# Patient Record
Sex: MALE | Race: WHITE | NOT HISPANIC OR LATINO | Employment: OTHER | URBAN - METROPOLITAN AREA
[De-identification: names, ages, dates, MRNs, and addresses within clinical notes are randomized per-mention and may not be internally consistent; named-entity substitution may affect disease eponyms.]

---

## 2018-05-17 ENCOUNTER — CONVERSION ENCOUNTER (OUTPATIENT)
Dept: ORTHOPEDIC SURGERY | Facility: CLINIC | Age: 68
End: 2018-05-17

## 2018-05-17 ENCOUNTER — OFFICE VISIT CONVERTED (OUTPATIENT)
Dept: ORTHOPEDIC SURGERY | Facility: CLINIC | Age: 68
End: 2018-05-17
Attending: ORTHOPAEDIC SURGERY

## 2018-06-18 ENCOUNTER — CONVERSION ENCOUNTER (OUTPATIENT)
Dept: ORTHOPEDIC SURGERY | Facility: CLINIC | Age: 68
End: 2018-06-18

## 2018-06-18 ENCOUNTER — OFFICE VISIT CONVERTED (OUTPATIENT)
Dept: ORTHOPEDIC SURGERY | Facility: CLINIC | Age: 68
End: 2018-06-18
Attending: ORTHOPAEDIC SURGERY

## 2018-10-16 ENCOUNTER — OFFICE VISIT CONVERTED (OUTPATIENT)
Dept: SURGERY | Facility: CLINIC | Age: 68
End: 2018-10-16
Attending: SURGERY

## 2019-01-09 ENCOUNTER — HOSPITAL ENCOUNTER (OUTPATIENT)
Dept: SURGERY | Facility: HOSPITAL | Age: 69
Setting detail: HOSPITAL OUTPATIENT SURGERY
Discharge: HOME | End: 2019-01-09
Attending: SURGERY

## 2019-01-09 LAB — GLUCOSE BLD-MCNC: 107 MG/DL (ref 70–99)

## 2020-07-27 ENCOUNTER — OFFICE VISIT CONVERTED (OUTPATIENT)
Dept: SURGERY | Facility: CLINIC | Age: 70
End: 2020-07-27
Attending: SURGERY

## 2020-07-27 ENCOUNTER — CONVERSION ENCOUNTER (OUTPATIENT)
Dept: SURGERY | Facility: CLINIC | Age: 70
End: 2020-07-27

## 2020-07-30 ENCOUNTER — HOSPITAL ENCOUNTER (OUTPATIENT)
Dept: PREADMISSION TESTING | Facility: HOSPITAL | Age: 70
Discharge: HOME | End: 2020-07-30
Attending: SURGERY

## 2020-08-02 LAB — SARS-COV-2 RNA SPEC QL NAA+PROBE: NOT DETECTED

## 2020-08-04 ENCOUNTER — HOSPITAL ENCOUNTER (OUTPATIENT)
Dept: PERIOP | Facility: HOSPITAL | Age: 70
Setting detail: HOSPITAL OUTPATIENT SURGERY
Discharge: HOME | End: 2020-08-04
Attending: SURGERY

## 2020-08-04 LAB
GLUCOSE BLD-MCNC: 113 MG/DL (ref 70–99)
GLUCOSE BLD-MCNC: 114 MG/DL (ref 70–99)

## 2020-08-12 ENCOUNTER — OFFICE VISIT CONVERTED (OUTPATIENT)
Dept: SURGERY | Facility: CLINIC | Age: 70
End: 2020-08-12
Attending: SURGERY

## 2020-08-26 ENCOUNTER — OFFICE VISIT CONVERTED (OUTPATIENT)
Dept: SURGERY | Facility: CLINIC | Age: 70
End: 2020-08-26
Attending: SURGERY

## 2021-03-15 ENCOUNTER — HOSPITAL ENCOUNTER (OUTPATIENT)
Dept: VACCINE CLINIC | Facility: HOSPITAL | Age: 71
Discharge: HOME | End: 2021-03-15
Attending: INTERNAL MEDICINE

## 2021-04-05 ENCOUNTER — HOSPITAL ENCOUNTER (OUTPATIENT)
Dept: VACCINE CLINIC | Facility: HOSPITAL | Age: 71
Discharge: HOME | End: 2021-04-05
Attending: INTERNAL MEDICINE

## 2021-05-10 NOTE — H&P
History and Physical      Patient Name: Christopher Finley   Patient ID: [id number]   Sex: Male   YOB: 1950    Primary Care Provider: Samy Wing MD   Referring Provider: Samy Wing MD    Visit Date: August 12, 2020    Provider: Yomaira Roberts MD   Location: [facility]   Location Address: [address] [place] [address]  [id number]   Location Phone: [phone]          Chief Complaint  · Follow Up Surgery      History Of Present Illness  Christopher Finley is a 69 year old /White male who is here today for follow up of: hemorrhoidectomy x 2.   He is doing well-status post surgery. Pathologyw as hemorrhoids.       Past Medical History  Anemia, Unspecified; Arthritis; Arthritis; Bladder Disorder; Blood Diseases; Diabetes; High blood pressure; High cholesterol; Hyperlipemia; Hypertension; Left elbow pain; Left Radial Head Fracture; Left shoulder pain, unspecified chronicity; Left wrist pain; Mood disorder; Rectal bleeding; Reflux         Past Surgical History  Colonoscopy; Hernia         Medication List  aspirin 81 mg oral tablet,chewable; bupropion HCl 300 mg oral tablet extended release 24 hr; Eye Drops 0.05-0.25 % ophthalmic (eye) drops; iron 325 mg (65 mg iron) oral tablet; Lexapro 20 mg oral tablet; lisinopril 10 mg oral tablet; metformin 500 mg oral tablet extended release 24 hr; pantoprazole 40 mg oral tablet,delayed release (DR/EC); Tiazac 180 mg oral capsule,extended release 24 hr         Allergy List  amoxicillin       Allergies Reconciled  Family Medical History  Stroke; Heart Disease; Cancer, Unspecified; Renal Calculus; -Father's Family History Unknown; -Mother's Family History Unknown; Family history of certain chronic disabling diseases; arthritis         Social History  Alcohol (Never); Alcohol Use (Never); Caffeine (Current some day); Claustophobic (Unknown); lives with spouse; .; Recreational Drug Use (Never); Retired.; Second hand smoke exposure  "(Never); Tobacco (Former)         Review of Systems  · Constitutional  o Denies  o : fever, chills  · Eyes  o Denies  o : yellowish discoloration of the eyes, eye pain  · HENT  o Denies  o : difficulty swallowing, hoarseness  · Cardiovascular  o Denies  o : chest pain on exertion, irregular heart beats  · Respiratory  o Denies  o : shortness of breath, cough  · Gastrointestinal  o Denies  o : nausea, vomiting, diarrhea, constipation  · Integument  o Admits  o : see HPI for surgical incision healing  · Neurologic  o Denies  o : tingling or numbness, loss of balance  · Musculoskeletal  o Denies  o : joint pain, back pain  · Endocrine  o Denies  o : weight gain, weight loss  · All Others Negative      Vitals  Date Time BP Position Site L\R Cuff Size HR RR TEMP (F) WT  HT  BMI kg/m2 BSA m2 O2 Sat HC       08/12/2020 09:12 AM       17  228lbs 0oz 5'  7\" 35.71 2.21           Physical Examination  · Constitutional  o Appearance  o : well developed/well nourished patient in no apparent distress  · Respiratory  o Inspection of Chest  o : equal breaths bilaterally  · Gastrointestinal  o Abdominal Examination  o : soft/nontender, nondistended, no organomegaly appreciated  · Post Surgical Incision  o Surgical wound  o : healing well, no erythema          Assessment  · Postoperative Exam Following Surgery     V67.00/Z09      Plan  · Medications  o Medications have been Reconciled  o Transition of Care or Provider Policy  · Instructions  o Return to office with any issues.  o F/U in 2 weeks.            Electronically Signed by: Yomaira Roberts MD -Author on August 12, 2020 09:39:56 AM  "

## 2021-05-10 NOTE — H&P
History and Physical      Patient Name: Christopher Finley   Patient ID: [id number]   Sex: Male   YOB: 1950    Referring Provider: Samy Wing MD    Visit Date: July 27, 2020    Provider: Yomaira Roberts MD   Location: [facility]   Location Address: [address] [place] [address]  [id number]   Location Phone: [phone]          Chief Complaint  · Outpatient History & Physical / Surgical Orders  · Hemorrhoids      History Of Present Illness  Christopher Finley is a 69 year old /White male who is referred by Samy Wing MD and who reports rectal bleeding after defecation and proloapsing rectal lesions while straining. In addition, patient reports rectal pain and itching. There are extensive external hemorrhoids. he had a colonoscopy in jan of last year.       Past Medical History  Anemia, Unspecified; Arthritis; Arthritis; Bladder Disorder; Blood Diseases; Diabetes; High blood pressure; High cholesterol; Hyperlipemia; Hypertension; Left elbow pain; Left Radial Head Fracture; Left shoulder pain, unspecified chronicity; Left wrist pain; Mood disorder; Rectal bleeding; Reflux         Past Surgical History  Colonoscopy; Hernia         Medication List  aspirin 81 mg oral tablet,chewable; bupropion HCl 300 mg oral tablet extended release 24 hr; Eye Drops 0.05-0.25 % ophthalmic (eye) drops; iron 325 mg (65 mg iron) oral tablet; Lexapro 20 mg oral tablet; lisinopril 10 mg oral tablet; metformin 500 mg oral tablet extended release 24 hr; pantoprazole 40 mg oral tablet,delayed release (DR/EC); Tiazac 180 mg oral capsule,extended release 24 hr         Allergy List  amoxicillin       Allergies Reconciled  Family Medical History  Stroke; Heart Disease; Cancer, Unspecified; Renal Calculus; -Father's Family History Unknown; -Mother's Family History Unknown; Family history of certain chronic disabling diseases; arthritis         Social History  Alcohol (Never); Alcohol Use (Never);  "Caffeine (Current some day); Claustophobic (Unknown); lives with spouse; .; Recreational Drug Use (Never); Retired.; Second hand smoke exposure (Never); Tobacco (Former)         Review of Systems  · Cardiovascular  o Denies  o : chest pain on exertion, shortness of breath, lower extremity swelling  · Respiratory  o Denies  o : wheezing, chronic cough, coughing up blood  · Gastrointestinal  o Denies  o : chronic abdominal pain, reflux symptoms, diarrhea      Vitals  Date Time BP Position Site L\R Cuff Size HR RR TEMP (F) WT  HT  BMI kg/m2 BSA m2 O2 Sat HC       07/27/2020 11:01 AM       15  229lbs 0oz 5'  7\" 35.87 2.22           Physical Examination  · Constitutional  o Appearance  o : well developed/well nourished patient in no apparent distress  · Respiratory  o Respiratory Effort  o : breathing unlabored  o Inspection of Chest  o : equal breaths bilaterally  · Gastrointestinal  o Abdominal Examination  o : soft/nontender, nondistended, no organomegaly appreciated  · Genitourinary  o Digital Rectal Examination  o : no evidence of thrombosed external hemorrhoids on inspection, extensive external hemorrhoids.          Assessment  · Hemorrhoids, External     455.3/K64.8      Plan  · Orders  o Hemorrhoidectomy (2 or more) (93058) - 455.3/K64.8 - 08/04/2020  o [facility] Pre-Op Covid-19 Screening (22918) - 455.3/K64.8 - 07/30/2020  · Medications  o Medications have been Reconciled  o Transition of Care or Provider Policy  · Instructions  o [facility]  o Handouts Provided  o PLAN: Hemorrhoidectomy. Patient understands risk/benefits and is willing to proceed. Patient understands risks include, but are not limited to, pain, urinary retention, bleeding, infection, damage to surrounding structures, including but not limited to, external anal sphincter resulting in fecal incontinence and/or anal stenosis. In addition, patient understands the risk of reoccurance of hemorrhoids is 5 " %.  o ****Surgical Orders****  o RISK AND BENEFITS:  o Consent for surgery: Given these options, the patient has verbally expressed an understanding of the risks of surgery and finds these risks acceptable. We will proceed with surgery as soon as possible.  o PREP: Per protocol  o IV: Per Anesthesia  o Please sign permit for: Exam under anesthesia and Hemorrhoidectomy  o *******************************************  o PRE- OP MEDICATION ORDER:  o *******************************************  o Kefzol 2 grams IV on call to OR.  o The above History and Physical Examination has been completed within 30 days of admission.  o ****Patient Status****  o Outpatient            Electronically Signed by: Yomaira Roberts MD -Author on July 27, 2020 11:11:23 AM

## 2021-05-10 NOTE — H&P
History and Physical      Patient Name: Christopher Finley   Patient ID: [id number]   Sex: Male   YOB: 1950    Primary Care Provider: Samy Wing MD   Referring Provider: Samy Wing MD    Visit Date: August 26, 2020    Provider: Yomaira Roberts MD   Location: [facility]   Location Address: [address] [place] [address]  [id number]   Location Phone: [phone]          Chief Complaint  · Follow Up Surgery      History Of Present Illness  Christopher Finley is a 69 year old /White male who is here today for follow up of: hemorrhoidectomy.   He is doing well-status post surgery. Pathology was hemorrhoids. Healed up well.       Past Medical History  Anemia, Unspecified; Arthritis; Arthritis; Bladder Disorder; Blood Diseases; Diabetes; High blood pressure; High cholesterol; Hyperlipemia; Hypertension; Left elbow pain; Left Radial Head Fracture; Left shoulder pain, unspecified chronicity; Left wrist pain; Mood disorder; Rectal bleeding; Reflux         Past Surgical History  Colonoscopy; Hernia         Medication List  aspirin 81 mg oral tablet,chewable; bupropion HCl 300 mg oral tablet extended release 24 hr; Eye Drops 0.05-0.25 % ophthalmic (eye) drops; iron 325 mg (65 mg iron) oral tablet; Lexapro 20 mg oral tablet; lisinopril 10 mg oral tablet; metformin 500 mg oral tablet extended release 24 hr; pantoprazole 40 mg oral tablet,delayed release (DR/EC); Tiazac 180 mg oral capsule,extended release 24 hr         Allergy List  amoxicillin       Allergies Reconciled  Family Medical History  Stroke; Heart Disease; Cancer, Unspecified; Renal Calculus; -Father's Family History Unknown; -Mother's Family History Unknown; Family history of certain chronic disabling diseases; arthritis         Social History  Alcohol (Never); Alcohol Use (Never); Caffeine (Current some day); Claustophobic (Unknown); lives with spouse; .; Recreational Drug Use (Never); Retired.; Second hand smoke  exposure (Never); Tobacco (Former)         Review of Systems  · Constitutional  o Denies  o : fever, chills  · Eyes  o Denies  o : yellowish discoloration of the eyes, eye pain  · HENT  o Denies  o : difficulty swallowing, hoarseness  · Cardiovascular  o Denies  o : chest pain on exertion, irregular heart beats  · Respiratory  o Denies  o : shortness of breath, cough  · Gastrointestinal  o Denies  o : nausea, vomiting, diarrhea, constipation  · Integument  o Admits  o : see HPI for surgical incision healing  · Neurologic  o Denies  o : tingling or numbness, loss of balance  · Musculoskeletal  o Denies  o : joint pain, back pain  · Endocrine  o Denies  o : weight gain, weight loss  · All Others Negative      Physical Examination  · Constitutional  o Appearance  o : well developed/well nourished patient in no apparent distress  · Respiratory  o Inspection of Chest  o : equal breaths bilaterally  · Gastrointestinal  o Abdominal Examination  o : soft/nontender, nondistended, no organomegaly appreciated  · Post Surgical Incision  o Surgical wound  o : healing well, no erythema          Assessment  · Postoperative Exam Following Surgery     V67.00/Z09      Plan  · Medications  o Medications have been Reconciled  o Transition of Care or Provider Policy  · Instructions  o Return to office with any issues.  o F/U PRN            Electronically Signed by: Yomaira Roberts MD -Author on August 26, 2020 08:59:22 AM

## 2021-05-15 VITALS — RESPIRATION RATE: 17 BRPM | BODY MASS INDEX: 35.79 KG/M2 | HEIGHT: 67 IN | WEIGHT: 228 LBS

## 2021-05-15 VITALS — RESPIRATION RATE: 15 BRPM | BODY MASS INDEX: 35.94 KG/M2 | WEIGHT: 229 LBS | HEIGHT: 67 IN

## 2021-05-16 VITALS — HEIGHT: 67 IN | HEART RATE: 80 BPM | BODY MASS INDEX: 36.31 KG/M2 | WEIGHT: 231.37 LBS | OXYGEN SATURATION: 98 %

## 2021-05-16 VITALS — WEIGHT: 234.12 LBS | RESPIRATION RATE: 20 BRPM | HEIGHT: 62 IN | BODY MASS INDEX: 43.08 KG/M2

## 2021-05-16 VITALS — HEART RATE: 63 BPM | OXYGEN SATURATION: 92 % | HEIGHT: 67 IN | BODY MASS INDEX: 36.92 KG/M2 | WEIGHT: 235.25 LBS

## 2021-06-30 ENCOUNTER — TELEPHONE (OUTPATIENT)
Dept: ORTHOPEDIC SURGERY | Facility: CLINIC | Age: 71
End: 2021-06-30

## 2021-06-30 NOTE — TELEPHONE ENCOUNTER
PT CAME IN TODAY HE WAS SEEN AT [facility] 6-28-21. HE HAD IMAGES DONE THERE AS WELL, HE IS WANTING TO GET AN APPOINTMENT WITH  FOR HIS LEFT ELBOW. I JUST NEEDED TO SEE HOW SOON WE NEED TO BRING HIM IN PLEASE. INSURANCE IS MEDICARE AND . [phone].

## 2021-06-30 NOTE — TELEPHONE ENCOUNTER
[facility] JUST SHOW OSTEOARTHRITIS, NO FRACTURE. PLEASE TALK WITH JORDEN OR LAURIE ON WHEN TO FIT PATIENT IN IF SCHEDULE IS FULL, OTHERWISE I WOULD SAY FIRST AVAILABLE APPOINTMENT. THANKS!

## 2021-07-13 ENCOUNTER — OFFICE VISIT (OUTPATIENT)
Dept: ORTHOPEDIC SURGERY | Facility: CLINIC | Age: 71
End: 2021-07-13

## 2021-07-13 VITALS — WEIGHT: 234 LBS | BODY MASS INDEX: 36.73 KG/M2 | HEIGHT: 67 IN | HEART RATE: 60 BPM | OXYGEN SATURATION: 96 %

## 2021-07-13 DIAGNOSIS — M77.12 LATERAL EPICONDYLITIS OF LEFT ELBOW: Primary | ICD-10-CM

## 2021-07-13 DIAGNOSIS — M19.022 OSTEOARTHRITIS OF LEFT ELBOW, UNSPECIFIED OSTEOARTHRITIS TYPE: ICD-10-CM

## 2021-07-13 PROCEDURE — 20605 DRAIN/INJ JOINT/BURSA W/O US: CPT | Performed by: ORTHOPAEDIC SURGERY

## 2021-07-13 PROCEDURE — 99203 OFFICE O/P NEW LOW 30 MIN: CPT | Performed by: ORTHOPAEDIC SURGERY

## 2021-07-13 RX ADMIN — METHYLPREDNISOLONE ACETATE 80 MG: 80 INJECTION, SUSPENSION INTRA-ARTICULAR; INTRALESIONAL; INTRAMUSCULAR; SOFT TISSUE at 13:18

## 2021-07-13 RX ADMIN — LIDOCAINE HYDROCHLORIDE 1 ML: 10 INJECTION, SOLUTION INFILTRATION; PERINEURAL at 13:18

## 2021-07-13 NOTE — PROGRESS NOTES
"Chief Complaint  Pain of the Left Elbow     Subjective      Christopher Finley presents to [facility] for Patient presents for evaluation of left elbow pain. Patient has a history of left radial head fracture, he was last seen in 2018. Patient states the left elbow has been causing him pain for the past 8 weeks. He denies injury. He states he has been seen by his PCP, they ordered xrays, he was given naproxen, IM steroid injection and muscle relaxer and nothing has helped his elbow pain. He also received prednisone medication with no relief. Patient states he also gets some tingling to his left hand when he has the pain.     Allergies   Allergen Reactions   • Amoxicillin Hives        Social History     Socioeconomic History   • Marital status:      Spouse name: Not on file   • Number of children: Not on file   • Years of education: Not on file   • Highest education level: Not on file   Tobacco Use   • Smoking status: Former Smoker     Quit date: 1970     Years since quittin.0   • Smokeless tobacco: Never Used   Vaping Use   • Vaping Use: Never used   Substance and Sexual Activity   • Alcohol use: Never   • Drug use: Never   • Sexual activity: Defer        Review of Systems     Objective   Vital Signs:   Pulse 60   Ht 170.2 cm (67\")   Wt 106 kg (234 lb)   SpO2 96%   BMI 36.65 kg/m²       Physical Exam  Constitutional:       Appearance: Normal appearance. He is well-developed and normal weight.   HENT:      Head: Normocephalic.      Right Ear: Hearing and external ear normal.      Left Ear: Hearing and external ear normal.      Nose: Nose normal.   Eyes:      Conjunctiva/sclera: Conjunctivae normal.   Cardiovascular:      Rate and Rhythm: Normal rate.   Pulmonary:      Effort: Pulmonary effort is normal.      Breath sounds: No wheezing or rales.   Abdominal:      Palpations: Abdomen is soft.      Tenderness: There is no abdominal tenderness.   Musculoskeletal:      " Cervical back: Normal range of motion.   Skin:     Findings: No rash.   Neurological:      Mental Status: He is alert and oriented to person, place, and time.   Psychiatric:         Mood and Affect: Mood and affect normal.         Judgment: Judgment normal.       Ortho Exam      Left elbow: Skin is intact, no erythema, no ecchymosis, no swelling. Full ROM with flexion, extension, supination and pronation. Neurovascular intact. Tender to palpation of medial and lateral /posterior elbow.     Small Joint Arthrocentesis  Consent given by: patient  Site marked: site marked  Timeout: Immediately prior to procedure a time out was called to verify the correct patient, procedure, equipment, support staff and site/side marked as required   Supporting Documentation  Indications: pain   Procedure Details  Location: LEFT ELBOW   Preparation: Patient was prepped and draped in the usual sterile fashion  Needle gauge: 23G   Medications administered: 80 mg methylPREDNISolone acetate 80 MG/ML; 1 mL lidocaine 1 %  Patient tolerance: patient tolerated the procedure well with no immediate complications          XR Elbow 3+ View Left    Result Date: 6/28/2021  Narrative: PROCEDURE: XR ELBOW 3+ VW LEFT  COMPARISON: [facility], CR, ELBOW >OR= 3V LT, 5/03/2018, 12:43.  INDICATIONS: pain - old injury  FINDINGS:  No fractures are visualized.  No lytic or sclerotic bone lesions are seen.  Mild degenerative spurring is consistent with osteoarthritis.  There is no evidence of an abnormal amount of fluid within the joint.  CONCLUSION:  Left elbow series demonstrating mild degenerative spurring consistent with osteoarthritis.      CHIARA VERMA MD       Electronically Signed and Approved By: CHIARA VERMA MD on 6/28/2021 at 15:32                 Imaging Results (Most Recent)     None           Result Review :       XR Elbow 3+ View Left    Result Date: 6/28/2021  Narrative: PROCEDURE: XR ELBOW 3+ VW LEFT  COMPARISON: [facility], CR, ELBOW >OR=  3V LT, 5/03/2018, 12:43.  INDICATIONS: pain - old injury  FINDINGS:  No fractures are visualized.  No lytic or sclerotic bone lesions are seen.  Mild degenerative spurring is consistent with osteoarthritis.  There is no evidence of an abnormal amount of fluid within the joint.  CONCLUSION:  Left elbow series demonstrating mild degenerative spurring consistent with osteoarthritis.      CHIARA VERMA MD       Electronically Signed and Approved By: CHIARA VERMA MD on 6/28/2021 at 15:32                      Assessment and Plan     DX: Left elbow pain/Left elbow arthritis      Reviewed xrays with the patient, advised him of diagnosis and treatment options, patient elected to have left elbow injection and tolerated it well. Home exercises given. Follow up in 6 weeks.       Call or return if worsening symptoms.    Follow Up     Follow up in 6 weeks for recheck.      Patient was given instructions and counseling regarding his condition or for health maintenance advice. Please see specific information pulled into the AVS if appropriate.     Scribed for Black Jimenez MD by DAI Martinez.  07/13/21   13:01 EDT    I have personally performed the services described in this document as scribed by the above individual and it is both accurate and complete.  Black Jimenez MD 07/13/21  13:01 EDT

## 2021-07-14 RX ORDER — METHYLPREDNISOLONE ACETATE 80 MG/ML
80 INJECTION, SUSPENSION INTRA-ARTICULAR; INTRALESIONAL; INTRAMUSCULAR; SOFT TISSUE
Status: COMPLETED | OUTPATIENT
Start: 2021-07-13 | End: 2021-07-13

## 2021-07-14 RX ORDER — LIDOCAINE HYDROCHLORIDE 10 MG/ML
1 INJECTION, SOLUTION INFILTRATION; PERINEURAL
Status: COMPLETED | OUTPATIENT
Start: 2021-07-13 | End: 2021-07-13

## 2021-08-04 ENCOUNTER — OFFICE VISIT (OUTPATIENT)
Dept: OTOLARYNGOLOGY | Facility: CLINIC | Age: 71
End: 2021-08-04

## 2021-08-04 VITALS — TEMPERATURE: 97.8 F | WEIGHT: 235.4 LBS | BODY MASS INDEX: 36.95 KG/M2 | HEIGHT: 67 IN

## 2021-08-04 DIAGNOSIS — L98.9 LESION OF FACE: Primary | ICD-10-CM

## 2021-08-04 DIAGNOSIS — Z86.018: ICD-10-CM

## 2021-08-04 PROCEDURE — 11104 PUNCH BX SKIN SINGLE LESION: CPT | Performed by: OTOLARYNGOLOGY

## 2021-08-04 PROCEDURE — 88305 TISSUE EXAM BY PATHOLOGIST: CPT | Performed by: OTOLARYNGOLOGY

## 2021-08-04 PROCEDURE — 99203 OFFICE O/P NEW LOW 30 MIN: CPT | Performed by: OTOLARYNGOLOGY

## 2021-08-04 RX ORDER — CYPROHEPTADINE HYDROCHLORIDE 4 MG/1
4 TABLET ORAL NIGHTLY
COMMUNITY

## 2021-08-04 RX ORDER — CARBAMAZEPINE 200 MG/1
200 TABLET ORAL NIGHTLY
COMMUNITY

## 2021-08-04 NOTE — PROGRESS NOTES
Patient Name: Christopher Finley   Visit Date: 08/04/2021   Patient ID: [id number]  Provider: Octavio Davey MD    Sex: male  Location: [facility]   YOB: 1950  Location Address: [address] [place] [address], [address],?[place]?[address]    Primary Care Provider Provider, No Known  Location Phone: [phone]    Referring Provider: No ref. provider found        Chief Complaint  Oral cyst    History of Present Illness  Christopher Finley is a 70 y.o. male who presents to [facility] today as a consult from No ref. provider found for evaluation.  He tells me that back in July he was seen for routine dental examination and a cystic lesion was noted on his Panorex.  He was then sent to Dr. Slulivan ended up performing what sounds like an enucleation and curettage of the lesion in the area of tooth #32 on 7/14/2021.  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  He denies any history of skin cancer or family history of skin cancer or odontogenic cysts.  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  There is a lesion to his right cheek which he tells me has been present for years.  He is unsure if it is changing in size but does tell me it has bled previously.  It does not hurt.  He denies any neck mass or neck pain.  He has not experienced any fever, chills, night sweats, or unintentional weight loss.      Past Medical History:   Diagnosis Date   • Acid reflux    • Anemia    • Arthritis    • Bladder disorder    • Blood disease    • Chronic post-traumatic stress disorder (PTSD)    • Depression    • Diabetes (CMS/HCC)    • Diabetes mellitus (CMS/HCC)    • GERD (gastroesophageal reflux disease)    • High blood pressure    • High cholesterol    • Hyperlipemia    • Hyperlipidemia    • Hypertension    • Left elbow pain 06/18/2018   • Left radial head fracture 05/17/2018   • Left shoulder pain  2018   • Left wrist pain 2018   • Mood disorder (CMS/HCC)    • Rectal bleeding        Past Surgical History:   Procedure Laterality Date   • COLONOSCOPY     • HEMORRHOIDECTOMY      BANDED, ,, ,    • INGUINAL HERNIA REPAIR     • TENDON REPAIR      LEFT HAND         Current Outpatient Medications:   •  Aspirin Low Dose 81 MG EC tablet, , Disp: , Rfl:   •  buPROPion XL (WELLBUTRIN XL) 300 MG 24 hr tablet, bupropion HCl 300 mg oral tablet extended release 24 hr take 1 tablet (300 mg) by oral route once daily   Active, Disp: , Rfl:   •  carBAMazepine (TEGretol) 200 MG tablet, Take 200 mg by mouth Daily., Disp: , Rfl:   •  cyproheptadine (PERIACTIN) 4 MG tablet, Take 4 mg by mouth 2 (two) times a day., Disp: , Rfl:   •  dilTIAZem (Tiazac) 180 MG 24 hr capsule, Tiazac 180 mg oral capsule,extended release 24 hr take 1 capsule (180 mg) by oral route once daily   Active, Disp: , Rfl:   •  escitalopram (Lexapro) 20 MG tablet, Lexapro 20 mg oral tablet take 1 tablet by oral route 2 times a day   Active, Disp: , Rfl:   •  ferrous sulfate 325 (65 FE) MG tablet, iron 325 mg (65 mg iron) oral tablet take 1 tablet (325 mg) by oral route once daily   Active, Disp: , Rfl:   •  lisinopril (PRINIVIL,ZESTRIL) 10 MG tablet, , Disp: , Rfl:   •  metFORMIN (GLUCOPHAGE) 500 MG tablet, , Disp: , Rfl:   •  pantoprazole (PROTONIX) 40 MG EC tablet, , Disp: , Rfl:   •  rosuvastatin (CRESTOR) 40 MG tablet, , Disp: , Rfl:   •  tetrahydrozoline-zinc (VISINE-AC) 0.05-0.25 % ophthalmic solution, 1 drop., Disp: , Rfl:   •  Vitamin D 125 MCG (5000 UT) capsule capsule, , Disp: , Rfl:      Allergies   Allergen Reactions   • Amoxicillin Hives       Social History     Tobacco Use   • Smoking status: Former Smoker     Quit date: 1970     Years since quittin.1   • Smokeless tobacco: Never Used   Vaping Use   • Vaping Use: Never used   Substance Use Topics   • Alcohol use: Never   • Drug use: Never        Objective  "    Vital Signs:   Temp 97.8 °F (36.6 °C) (Temporal)   Ht 170.2 cm (67\")   Wt 107 kg (235 lb 6.4 oz)   BMI 36.87 kg/m²       Physical Exam    General: Well developed, well nourished patient of stated age in no acute distress. Voice is strong and clear.   Head: Normocephalic and atraumatic.  Face: Right preauricular ulcerated lesion measuring 1 cm in diameter with a small overlying crust.  Bilateral parotid and submandibular glands are unremarkable.  Stensen's and Warthin's ducts are productive of clear saliva bilaterally.  House-Brackmann I/VI     bilaterally.   muscles and temporomandibular joint nontender to palpation.  No TMJ crepitus.  Eyes: PERRLA, sclerae anicteric, no conjunctival injection. Extra ocular movements are intact and full. No nystagmus.   Ears: Auricles are normal in appearance. Bilateral external auditory canals are unremarkable. Bilateral tympanic membranes are clear and without effusion. Hearing normal to conversational voice.   Nose: External nose is normal in appearance. Bilateral nares are patent with normal appearing mucosa. Septum deviated to the left. Turbinates are unremarkable. No lesions.   Oral Cavity: Lips are normal in appearance. Oral mucosa is unremarkable. Gingiva is unremarkable. Partial dentition for age. Tongue is unremarkable with good movement. Hard palate is unremarkable.   Oropharynx: Soft palate is unremarkable with full movement. Uvula is unremarkable. Bilateral tonsils are unremarkable. Posterior oropharynx is unremarkable.    Larynx and hypopharynx: Deferred secondary to gag reflex.  Neck: Supple.  No mass.  Nontender to palpation.  Trachea midline. Thyroid normal size and without nodules to palpation.   Lymphatic: No lymphadenopathy upon palpation.  Respiratory: Clear to auscultation bilaterally, nonlabored respirations    Cardiovascular: RRR, no murmurs, rubs, or gallops,   Psychiatric: Appropriate affect, cooperative   Neurologic: Oriented x 3, strength " symmetric in all extremities, Cranial Nerves II-XII are grossly intact to confrontation   Skin: Warm and dry. No rashes.    Procedures      Procedure: Punch biopsy of right facial lesion.    Summary: The area surrounding the right preauricular ulcerated lesion was anesthetized with 0.5 mL 1% lidocaine with one 100,000 epinephrine.  After giving the medication ample time to take effect the skin was cleaned with alcohol wipe and a 2 mm punch biopsy was obtained from the lesion.  This will be sent for permanent pathological review.  Hemostasis was obtained with pressure.  The patient tolerated the procedure well.      Result Review :               Assessment and Plan    Diagnoses and all orders for this visit:    1. Lesion of face (Primary)  -     Tissue Pathology Exam; Future  -     Tissue Pathology Exam    2. History of odontogenic keratocyst  -     Tissue Pathology Exam; Future  -     Tissue Pathology Exam    Impressions and findings were discussed at great length.  Currently, he is seen after undergoing enucleation and curettage of an odontogenic keratocyst/keratocystic odontogenic tumor on 7/14/2021 by Dr. Sullivan .  He does have a significant history of sun exposure and there is an obviously ulcerated right preauricular mass.  We discussed that occasionally his type of odontogenic cyst can be associated with basal cell nevus syndrome.  Given the lesion to the right face have recommended punch biopsy by excision if this is in fact a basal cell carcinoma.  If this is the case he will also be referred to dermatology.  He was given ample time to ask questions, all of which were answered to satisfaction.  We will discuss his results over the phone and determine further follow-up.        Follow Up   Return in about 2 weeks (around 8/18/2021).  Patient was given instructions and counseling regarding his condition or for health maintenance advice. Please see specific information pulled into the AVS if appropriate.

## 2021-08-06 LAB
CYTO UR: NORMAL
LAB AP CASE REPORT: NORMAL
LAB AP CLINICAL INFORMATION: NORMAL
PATH REPORT.FINAL DX SPEC: NORMAL
PATH REPORT.GROSS SPEC: NORMAL

## 2021-08-18 ENCOUNTER — OFFICE VISIT (OUTPATIENT)
Dept: OTOLARYNGOLOGY | Facility: CLINIC | Age: 71
End: 2021-08-18

## 2021-08-18 DIAGNOSIS — D04.39 SQUAMOUS CELL CARCINOMA IN SITU (SCCIS) OF SKIN OF CHEEK: Primary | ICD-10-CM

## 2021-08-18 DIAGNOSIS — L98.9 LESION OF FACE: ICD-10-CM

## 2021-08-18 PROCEDURE — 99441 PR PHYS/QHP TELEPHONE EVALUATION 5-10 MIN: CPT | Performed by: OTOLARYNGOLOGY

## 2021-08-18 NOTE — PROGRESS NOTES
Patient Name: Christopher Finley   Visit Date: 08/18/2021   Patient ID: [id number]  Provider: Octavio Davey MD    Sex: male  Location: [facility]   YOB: 1950  Location Address: [address] [place] [address], [address],?[place]?[address]    Primary Care Provider Provider, No Known  Location Phone: [phone]    Referring Provider: No ref. provider found        Chief Complaint  No chief complaint on file.    History of Present Illness  TELEHEALTH TELEPHONE VISIT    Christopher Finley is a 70 y.o. male who is presenting for evaluation via telehealth telephone visit. Verbal consent obtained before beginning visit.  We reviewed the results of his 8/4/2021 punch biopsy of the right preauricular ulcer which revealed invasive moderately differentiated squamous cell carcinoma.  We discussed the pathophysiology and natural history of the skin cancers.  Options for management were discussed and he would like to pursue excision in clinic.  This will be arranged at his earliest convenience.  We spoke for a total of 6 minutes.    Past Medical History/Overview of Patient Symptoms  Past Medical History:   Diagnosis Date   • Acid reflux    • Anemia    • Arthritis    • Bladder disorder    • Blood disease    • Chronic post-traumatic stress disorder (PTSD)    • Depression    • Diabetes (CMS/HCC)    • Diabetes mellitus (CMS/HCC)    • GERD (gastroesophageal reflux disease)    • High blood pressure    • High cholesterol    • Hyperlipemia    • Hyperlipidemia    • Hypertension    • Left elbow pain 06/18/2018   • Left radial head fracture 05/17/2018   • Left shoulder pain 05/17/2018   • Left wrist pain 06/22/2018   • Mood disorder (CMS/HCC)    • Rectal bleeding            Assessment and Plan    There are no diagnoses linked to this encounter.      Follow Up   No follow-ups on file.  Patient was given instructions and counseling regarding his condition or for health maintenance advice. Please see specific information  pulled into the AVS if appropriate.

## 2021-08-24 ENCOUNTER — OFFICE VISIT (OUTPATIENT)
Dept: ORTHOPEDIC SURGERY | Facility: CLINIC | Age: 71
End: 2021-08-24

## 2021-08-24 VITALS — WEIGHT: 234.2 LBS | HEIGHT: 67 IN | BODY MASS INDEX: 36.76 KG/M2 | HEART RATE: 60 BPM | OXYGEN SATURATION: 97 %

## 2021-08-24 DIAGNOSIS — M19.022 OSTEOARTHRITIS OF LEFT ELBOW, UNSPECIFIED OSTEOARTHRITIS TYPE: ICD-10-CM

## 2021-08-24 DIAGNOSIS — M77.12 LATERAL EPICONDYLITIS OF LEFT ELBOW: Primary | ICD-10-CM

## 2021-08-24 PROCEDURE — 99213 OFFICE O/P EST LOW 20 MIN: CPT | Performed by: PHYSICIAN ASSISTANT

## 2021-08-24 NOTE — PROGRESS NOTES
"Chief Complaint  Pain of the Left Elbow    Subjective          Christopher Finley is a 70 y.o. male  presents to [facility] for   History of Present Illness    Patient presents for follow-up evaluation of left elbow arthritis, left lateral epicondylitis.  Patient was last seen by Dr. Jimenez on 2021 he received a steroid injection in his left elbow and he received home exercises.  Patient states he has done home exercises he states the injection helped for a few weeks he states it just wore off over the last week or 2.  Patient points to pain in his posterior lateral elbow, he states he uses Voltaren gel and BenGay due to his return of his pain.  Patient states the pain keeps him up at night, he denies weakness, denies difficulty with range of motion, denies recent injury he does state he had a \"chip fracture \"when he injured his elbow in 2018 due to a fall.  Patient denies swelling.  Allergies   Allergen Reactions   • Amoxicillin Hives        Social History     Socioeconomic History   • Marital status:      Spouse name: Not on file   • Number of children: Not on file   • Years of education: Not on file   • Highest education level: Not on file   Tobacco Use   • Smoking status: Former Smoker     Quit date: 1970     Years since quittin.1   • Smokeless tobacco: Never Used   Vaping Use   • Vaping Use: Never used   Substance and Sexual Activity   • Alcohol use: Never   • Drug use: Never   • Sexual activity: Defer        REVIEW OF SYSTEMS    Constitutional: Denies fevers, chills, weight loss  Cardiovascular: Denies chest pain, shortness of breath  Skin: Denies rashes, acute skin changes  Neurologic: Denies headache, loss of consciousness  MSK: Left elbow pain      Objective   Vital Signs:   Pulse 60   Ht 170.2 cm (67\")   Wt 106 kg (234 lb 3.2 oz)   SpO2 97%   BMI 36.68 kg/m²     Body mass index is 36.68 kg/m².    Physical Exam    Left elbow: No pain with resisted " wrist extension, no pain with wrist resisted third finger extension, tenderness to palpation of the lateral posterior elbow, full extension, flexion full, full supination and pronation, neurovascularly intact.    Procedures    Imaging Results (Most Recent)     None           Result Review :{Labs  Result Review  Imaging  Med Tab  Media  Procedures :23}   The following data was reviewed by: DAI Martinez on 08/24/2021:               Assessment and Plan    Diagnoses and all orders for this visit:    1. Lateral epicondylitis of left elbow (Primary)  -     MRI Elbow Left Without Contrast; Future    2. Osteoarthritis of left elbow, unspecified osteoarthritis type  -     MRI Elbow Left Without Contrast; Future        Discussed that we will order MRI of the left elbow for further evaluation, patient agreed, we discussed use of counterforce strap, patient will pursue this.  Continue Voltaren gel and BenGay, follow-up after MRI.    Call or return if worsening symptoms.    Follow Up   Return for After MRI.  Patient was given instructions and counseling regarding his condition or for health maintenance advice. Please see specific information pulled into the AVS if appropriate.

## 2021-09-03 ENCOUNTER — OFFICE VISIT (OUTPATIENT)
Dept: OTOLARYNGOLOGY | Facility: CLINIC | Age: 71
End: 2021-09-03

## 2021-09-03 VITALS — TEMPERATURE: 97.3 F | HEIGHT: 67 IN | WEIGHT: 234.6 LBS | BODY MASS INDEX: 36.82 KG/M2

## 2021-09-03 DIAGNOSIS — D04.39 SQUAMOUS CELL CARCINOMA IN SITU (SCCIS) OF SKIN OF CHEEK: Primary | ICD-10-CM

## 2021-09-03 PROCEDURE — 12051 INTMD RPR FACE/MM 2.5 CM/<: CPT | Performed by: OTOLARYNGOLOGY

## 2021-09-03 PROCEDURE — 88305 TISSUE EXAM BY PATHOLOGIST: CPT | Performed by: OTOLARYNGOLOGY

## 2021-09-03 PROCEDURE — 11643 EXC F/E/E/N/L MAL+MRG 2.1-3: CPT | Performed by: OTOLARYNGOLOGY

## 2021-09-03 NOTE — PROGRESS NOTES
Procedure: Excision of malignant lesion of the face.    Indication: Right preauricular lesion which was positive for invasive moderately differentiated squamous cell carcinoma on previous punch biopsy.    Summary: The area surrounding the lesion was infiltrated with 4 mL of 1% lidocaine with 1-100,000 epinephrine.  After giving the medication ample time to take effect the right face was prepped and draped in sterile fashion.  The lesion was excised sharply using a #15 blade and sent for permanent pathological review.  The excision measured 2.5 x 1.5 cm in dimension.  Hemostasis was obtained by holding pressure.  The wound was then closed in layered fashion using 4-0 Vicryl for the deep sutures and 5-0 fast gut in simple running fashion for the skin.  Antibiotic ointment was applied.  The patient tolerated the procedure well.

## 2021-09-07 ENCOUNTER — LAB REQUISITION (OUTPATIENT)
Dept: LAB | Facility: HOSPITAL | Age: 71
End: 2021-09-07

## 2021-09-07 DIAGNOSIS — L98.9 DISORDER OF THE SKIN AND SUBCUTANEOUS TISSUE, UNSPECIFIED: ICD-10-CM

## 2021-09-08 ENCOUNTER — HOSPITAL ENCOUNTER (OUTPATIENT)
Dept: MRI IMAGING | Facility: HOSPITAL | Age: 71
Discharge: HOME | End: 2021-09-08
Admitting: PHYSICIAN ASSISTANT

## 2021-09-08 DIAGNOSIS — M77.12 LATERAL EPICONDYLITIS OF LEFT ELBOW: ICD-10-CM

## 2021-09-08 DIAGNOSIS — M19.022 OSTEOARTHRITIS OF LEFT ELBOW, UNSPECIFIED OSTEOARTHRITIS TYPE: ICD-10-CM

## 2021-09-08 PROCEDURE — 73221 MRI JOINT UPR EXTREM W/O DYE: CPT

## 2021-09-16 ENCOUNTER — OFFICE VISIT (OUTPATIENT)
Dept: ORTHOPEDIC SURGERY | Facility: CLINIC | Age: 71
End: 2021-09-16

## 2021-09-16 VITALS — OXYGEN SATURATION: 94 % | WEIGHT: 234.4 LBS | BODY MASS INDEX: 36.79 KG/M2 | HEART RATE: 69 BPM | HEIGHT: 67 IN

## 2021-09-16 DIAGNOSIS — M19.022 OSTEOARTHRITIS OF LEFT ELBOW, UNSPECIFIED OSTEOARTHRITIS TYPE: Primary | ICD-10-CM

## 2021-09-16 PROCEDURE — 99213 OFFICE O/P EST LOW 20 MIN: CPT | Performed by: ORTHOPAEDIC SURGERY

## 2021-09-16 RX ORDER — DILTIAZEM HYDROCHLORIDE 180 MG/1
CAPSULE, COATED, EXTENDED RELEASE ORAL
COMMUNITY
Start: 2021-08-24 | End: 2021-12-15

## 2021-09-16 NOTE — PROGRESS NOTES
"Chief Complaint  Pain of the Left Elbow     Subjective      Christopher Finley presents to [facility] for follow up evaluation of the left elbow. The patient has been treating his left elbow lateral epicondylitis conservatively. He recently had an MRI and is here today for those results. He reports the lateral epicondylitis injections have given him short relief. He uses Voltaren gel and BenGay due to his return of his pain.  Patient states the pain keeps him up at night, he denies weakness, denies difficulty with range of motion, denies recent injury he does state he had a \"chip fracture \"when he injured his elbow in 2018 due to a fall.  Patient denies swelling.    Allergies   Allergen Reactions   • Amoxicillin Hives        Social History     Socioeconomic History   • Marital status:      Spouse name: Not on file   • Number of children: Not on file   • Years of education: Not on file   • Highest education level: Not on file   Tobacco Use   • Smoking status: Former Smoker     Quit date: 1970     Years since quittin.2   • Smokeless tobacco: Never Used   Vaping Use   • Vaping Use: Never used   Substance and Sexual Activity   • Alcohol use: Never   • Drug use: Never   • Sexual activity: Defer        Review of Systems     Objective   Vital Signs:   Pulse 69   Ht 170.2 cm (67\")   Wt 106 kg (234 lb 6.4 oz)   SpO2 94%   BMI 36.71 kg/m²       Physical Exam  Constitutional:       Appearance: Normal appearance. The patient is well-developed and normal weight.   HENT:      Head: Normocephalic.      Right Ear: Hearing and external ear normal.      Left Ear: Hearing and external ear normal.      Nose: Nose normal.   Eyes:      Conjunctiva/sclera: Conjunctivae normal.   Cardiovascular:      Rate and Rhythm: Normal rate.   Pulmonary:      Effort: Pulmonary effort is normal.      Breath sounds: No wheezing or rales.   Abdominal:      Palpations: Abdomen is soft.      Tenderness: There " is no abdominal tenderness.   Musculoskeletal:      Cervical back: Normal range of motion.   Skin:     Findings: No rash.   Neurological:      Mental Status: The patient is alert and oriented to person, place, and time.   Psychiatric:         Mood and Affect: Mood and affect normal.         Judgment: Judgment normal.       Ortho Exam      Left elbow: No pain with resisted wrist extension, no pain with wrist resisted third finger extension, tenderness to palpation of the lateral posterior elbow, full extension, flexion full, full supination and pronation, neurovascularly intact. ROM 0-150 degrees.     Procedures    Imaging Results (Most Recent)     None           Result Review :       MRI Elbow Left Without Contrast    Result Date: 9/9/2021  Narrative: PROCEDURE: MRI ELBOW LEFT WO CONTRAST  COMPARISON:  [facility], CR, XR ELBOW 3+ VW LEFT, 6/28/2021, 15:20. INDICATIONS: left elbow pain      TECHNIQUE: A complete multi-planar examination was performed without contrast.   FINDINGS:  No fracture or malalignment is seen.  Marrow signal appears normal.  The radial and ulnar collateral ligaments are intact.  There is a small plica measuring 3 mm transverse noted along the radial aspect of the joint.  The common extensor and common flexor tendons appear intact.  The triceps, biceps and brachialis tendons all appear intact.  No significant elbow joint effusion or loose body is seen.  Cartilage in the joint is intact.  Ulnar nerve within the cubital tunnel demonstrates enlargement and abnormal T2 high signal suggesting neuropathy.  There is a defect in the cubital tunnel retinaculum  CONCLUSION:  1. 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 2. Defect in the cubital tunnel retinaculum, possibly postsurgical or secondary to previous trauma 3. Small plica along the radial aspect of the joint     Gregory Slater M.D.       Electronically Signed and Approved By: Gregory Slater M.D. on  9/09/2021 at 8:46                      Assessment and Plan     DX: Left elbow osteoarthritis     Discussed the treatment plan with the patient.  Plan for activity as tolerated. Discussed the risks and benefits of a left elbow joint injection. The patient expressed understanding and wished to proceed. He tolerated the injection well.     Call or return if worsening symptoms.    Follow Up     PRN      Patient was given instructions and counseling regarding his condition or for health maintenance advice. Please see specific information pulled into the AVS if appropriate.     Scribed for Black Jimenez MD by Rose Hidalgo.  09/16/21   14:37 EDT    I have personally performed the services described in this document as scribed by the above individual and it is both accurate and complete. Black Jimenez MD 09/18/21

## 2021-09-17 ENCOUNTER — OFFICE VISIT (OUTPATIENT)
Dept: OTOLARYNGOLOGY | Facility: CLINIC | Age: 71
End: 2021-09-17

## 2021-09-17 VITALS — BODY MASS INDEX: 36.98 KG/M2 | WEIGHT: 235.6 LBS | TEMPERATURE: 97 F | HEIGHT: 67 IN

## 2021-09-17 DIAGNOSIS — C44.99 BASOSQUAMOUS CARCINOMA OF SKIN: Primary | ICD-10-CM

## 2021-09-17 PROCEDURE — 99024 POSTOP FOLLOW-UP VISIT: CPT | Performed by: OTOLARYNGOLOGY

## 2021-09-17 NOTE — PROGRESS NOTES
Patient Name: Christopher Finley   Visit Date: 09/17/2021   Patient ID: [id number]  Provider: Octavio Davey MD    Sex: male  Location: [facility]   YOB: 1950  Location Address: [address] [place] [address], [address],?[place]?[id number]    Primary Care Provider Samy Wing MD  Location Phone: [phone]    Referring Provider: No ref. provider found        Chief Complaint  Follow-up (2 week follow up)    History of Present Illness  Christopher Finley is a 71 y.o. 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  He is not having any trouble.    Past Medical History:   Diagnosis Date   • Acid reflux    • Anemia    • Arthritis    • Bladder disorder    • Blood disease    • Chronic post-traumatic stress disorder (PTSD)    • Depression    • Diabetes (CMS/HCC)    • Diabetes mellitus (CMS/HCC)    • GERD (gastroesophageal reflux disease)    • High blood pressure    • High cholesterol    • Hyperlipemia    • Hyperlipidemia    • Hypertension    • Left elbow pain 06/18/2018   • Left radial head fracture 05/17/2018   • Left shoulder pain 05/17/2018   • Left wrist pain 06/22/2018   • Mood disorder (CMS/HCC)    • Rectal bleeding        Past Surgical History:   Procedure Laterality Date   • COLONOSCOPY     • HEMORRHOIDECTOMY      BANDED, 2004,2006, 2007, 2020   • INGUINAL HERNIA REPAIR  1998   • TENDON REPAIR      LEFT HAND         Current Outpatient Medications:   •  Aspirin Low Dose 81 MG EC tablet, , Disp: , Rfl:   •  buPROPion XL (WELLBUTRIN XL) 300 MG 24 hr tablet, bupropion HCl 300 mg oral tablet extended release 24 hr take 1 tablet (300 mg) by oral route once daily   Active, Disp: , Rfl:   •  carBAMazepine (TEGretol) 200 MG tablet, Take 200 mg by mouth Daily., Disp: , Rfl:   •  cyproheptadine (PERIACTIN) 4 MG tablet, Take  "4 mg by mouth 2 (two) times a day., Disp: , Rfl:   •  dilTIAZem (Tiazac) 180 MG 24 hr capsule, Tiazac 180 mg oral capsule,extended release 24 hr take 1 capsule (180 mg) by oral route once daily   Active, Disp: , Rfl:   •  dilTIAZem CD (CARDIZEM CD) 180 MG 24 hr capsule, , Disp: , Rfl:   •  escitalopram (Lexapro) 20 MG tablet, Lexapro 20 mg oral tablet take 1 tablet by oral route 2 times a day   Active, Disp: , Rfl:   •  ferrous sulfate 325 (65 FE) MG tablet, iron 325 mg (65 mg iron) oral tablet take 1 tablet (325 mg) by oral route once daily   Active, Disp: , Rfl:   •  lisinopril (PRINIVIL,ZESTRIL) 10 MG tablet, , Disp: , Rfl:   •  metFORMIN (GLUCOPHAGE) 500 MG tablet, , Disp: , Rfl:   •  pantoprazole (PROTONIX) 40 MG EC tablet, , Disp: , Rfl:   •  rosuvastatin (CRESTOR) 40 MG tablet, , Disp: , Rfl:   •  tetrahydrozoline-zinc (VISINE-AC) 0.05-0.25 % ophthalmic solution, 1 drop., Disp: , Rfl:   •  Vitamin D 125 MCG (5000 UT) capsule capsule, , Disp: , Rfl:      Allergies   Allergen Reactions   • Amoxicillin Hives       Social History     Tobacco Use   • Smoking status: Former Smoker     Quit date: 1970     Years since quittin.2   • Smokeless tobacco: Never Used   Vaping Use   • Vaping Use: Never used   Substance Use Topics   • Alcohol use: Never   • Drug use: Never        Objective     Vital Signs:   Temp 97 °F (36.1 °C) (Temporal)   Ht 170.2 cm (67\")   Wt 107 kg (235 lb 9.6 oz)   BMI 36.90 kg/m²       Physical Exam    General: Well developed, well nourished patient of stated age in no acute distress. Voice is strong and clear.   Head: Normocephalic and atraumatic.  Face: Right facial incision is healing well.  Bilateral parotid and submandibular glands are unremarkable.  Stensen's and Warthin's ducts are productive of clear saliva bilaterally.  House-Brackmann I/VI     bilaterally.   muscles and temporomandibular joint nontender to palpation.  No TMJ crepitus.  Eyes: PERRLA, sclerae anicteric, " no conjunctival injection. Extra ocular movements are intact and full. No nystagmus.   Ears: Auricles are normal in appearance. Bilateral external auditory canals are unremarkable. Bilateral tympanic membranes are clear and without effusion. Hearing normal to conversational voice.   Nose: External nose is normal in appearance. Bilateral nares are patent with normal appearing mucosa. Septum midline. Turbinates are unremarkable. No lesions.   Oral Cavity: Lips are normal in appearance. Oral mucosa is unremarkable. Gingiva is unremarkable. Normal dentition for age. Tongue is unremarkable with good movement. Hard palate is unremarkable.   Oropharynx: Soft palate is unremarkable with full movement. Uvula is unremarkable. Bilateral tonsils are unremarkable. Posterior oropharynx is unremarkable.    Larynx and hypopharynx: Deferred secondary to gag reflex.  Neck: Supple.  No mass.  Nontender to palpation.  Trachea midline. Thyroid normal size and without nodules to palpation.   Lymphatic: No lymphadenopathy upon palpation.   Psychiatric: Appropriate affect, cooperative   Neurologic: Oriented x 3, strength symmetric in all extremities, Cranial Nerves II-XII are grossly intact to confrontation   Skin: Warm and dry. No rashes.    Procedures           Result Review :               Assessment and Plan    Diagnoses and all orders for this visit:    1. Basosquamous carcinoma of skin (Primary)  -     Ambulatory Referral to Dermatology    Impressions and findings were discussed.  We reviewed his final pathology and he will be sent to dermatology for close examination.  We again discussed the possibility of basal cell nevoid nevus syndrome.  We also discussed continued postoperative care.  He may follow-up with me on an as-needed basis.        Follow Up   No follow-ups on file.  Patient was given instructions and counseling regarding his condition or for health maintenance advice. Please see specific information pulled into the AVS  if appropriate.

## 2021-11-23 ENCOUNTER — OFFICE VISIT (OUTPATIENT)
Dept: ORTHOPEDIC SURGERY | Facility: CLINIC | Age: 71
End: 2021-11-23

## 2021-11-23 ENCOUNTER — PREP FOR SURGERY (OUTPATIENT)
Dept: OTHER | Facility: HOSPITAL | Age: 71
End: 2021-11-23

## 2021-11-23 VITALS — HEIGHT: 67 IN | WEIGHT: 236.8 LBS | BODY MASS INDEX: 37.17 KG/M2

## 2021-11-23 DIAGNOSIS — M25.561 RIGHT KNEE PAIN, UNSPECIFIED CHRONICITY: Primary | ICD-10-CM

## 2021-11-23 DIAGNOSIS — M17.11 PRIMARY OSTEOARTHRITIS OF RIGHT KNEE: ICD-10-CM

## 2021-11-23 PROCEDURE — 99214 OFFICE O/P EST MOD 30 MIN: CPT | Performed by: ORTHOPAEDIC SURGERY

## 2021-11-23 RX ORDER — CLINDAMYCIN PHOSPHATE 900 MG/50ML
900 INJECTION INTRAVENOUS ONCE
Status: CANCELLED | OUTPATIENT
Start: 2021-11-23 | End: 2021-11-23

## 2021-11-23 RX ORDER — SUMATRIPTAN 25 MG/1
TABLET, FILM COATED ORAL
COMMUNITY
Start: 2021-11-02 | End: 2021-12-15

## 2021-11-23 RX ORDER — PSEUDOEPHEDRINE HCL 30 MG/1
TABLET, FILM COATED ORAL
COMMUNITY
Start: 2021-10-19 | End: 2021-12-15

## 2021-11-23 RX ORDER — HYDROCORTISONE 25 MG/G
1 CREAM TOPICAL AS NEEDED
COMMUNITY
Start: 2021-10-19

## 2021-11-23 NOTE — PROGRESS NOTES
"Chief Complaint  Pain of the Right Knee     Subjective      Christopher Finley presents to [facility] for evaluation of right knee pain. Patient reports he has been seen for the elbow prior and is doing well, however, his knee pain is bothering him now. He reports the pain has been present for a long time. He has tried NSAIDs. He is interested in operative treatment since the knee is causing him increasing pain and the inability to perform daily tasks.     Allergies   Allergen Reactions   • Amoxicillin Hives and Rash        Social History     Socioeconomic History   • Marital status:    Tobacco Use   • Smoking status: Former Smoker     Quit date: 1970     Years since quittin.4   • Smokeless tobacco: Never Used   Vaping Use   • Vaping Use: Never used   Substance and Sexual Activity   • Alcohol use: Never   • Drug use: Never   • Sexual activity: Defer        Review of Systems     Objective   Vital Signs:   Ht 170.2 cm (67\")   Wt 107 kg (236 lb 12.8 oz)   BMI 37.09 kg/m²       Physical Exam  Constitutional:       Appearance: Normal appearance. The patient is well-developed and normal weight.   HENT:      Head: Normocephalic.      Right Ear: Hearing and external ear normal.      Left Ear: Hearing and external ear normal.      Nose: Nose normal.   Eyes:      Conjunctiva/sclera: Conjunctivae normal.   Cardiovascular:      Rate and Rhythm: Normal rate.   Pulmonary:      Effort: Pulmonary effort is normal.      Breath sounds: No wheezing or rales.   Abdominal:      Palpations: Abdomen is soft.      Tenderness: There is no abdominal tenderness.   Musculoskeletal:      Cervical back: Normal range of motion.   Skin:     Findings: No rash.   Neurological:      Mental Status: The patient is alert and oriented to person, place, and time.   Psychiatric:         Mood and Affect: Mood and affect normal.         Judgment: Judgment normal.       Ortho Exam      Flexion 120 degrees, extension " -5 degrees, stable to varus and valgus stress, mild swelling, stable to anterior and posterior drawer, skin intact, tenderness over medial knee, sensation intact to light touch, EHL,FHL, GS and TA sensation intact, all five nerves of foot, positive pulses, neuro intact, no redness or atrophy, no skin discoloration, full weight bearing, ambulates with antalgic gait, positive crepitus     Procedures      Imaging Results (Most Recent)     Procedure Component Value Units Date/Time    XR Knee 3 View Right [[id number]] Resulted: 11/23/21 1457     Updated: 11/23/21 1504           Result Review :       X-Ray Report:  Right knee(s) X-Ray  Indication: Evaluation of right knee  AP, Lateral and Standing view(s)  Findings: reveals advanced degenerative changes with bone on bone articulation, small effusion, tricompartmental osteophytes, subchondral sclerosis, mild valgus deformity  Prior studies available for comparison: no       Assessment and Plan     Right Knee Osteoarthritis, advanced    Discussed surgery., Risks/benefits discussed with patient including, but not limited to: infection, bleeding, neurovascular damage, malunion, nonunion, aesthetic deformity, need for further surgery, and death., Discussed with patient the implant type being used during surgery and patient understands and desires to proceed. and Surgery pamphlet given.    Follow Up     Discussed treatment options with the patient. We reviewed his xrays and discussed surgical options as well as conservative options with injections, bracing, exercises, medications. We discussed the risks and benefits of surgery as well as recovery. He expressed understanding and wished to proceed with operative treatment with right knee total arthroplasty.       Patient was given instructions and counseling regarding his condition or for health maintenance advice. Please see specific information pulled into the AVS if appropriate.     Scribed for Black Jimenez MD by Brooklyn  Ponce.  11/23/21   15:12 EST    I have personally performed the services described in this document as scribed by the above individual and it is both accurate and complete. Black Jimenez MD 11/26/21

## 2021-12-15 ENCOUNTER — HOSPITAL ENCOUNTER (EMERGENCY)
Facility: HOSPITAL | Age: 71
Discharge: HOME | End: 2021-12-16
Attending: EMERGENCY MEDICINE | Admitting: EMERGENCY MEDICINE

## 2021-12-15 ENCOUNTER — APPOINTMENT (OUTPATIENT)
Dept: CT IMAGING | Facility: HOSPITAL | Age: 71
End: 2021-12-15

## 2021-12-15 VITALS
DIASTOLIC BLOOD PRESSURE: 73 MMHG | BODY MASS INDEX: 36.23 KG/M2 | HEIGHT: 67 IN | HEART RATE: 62 BPM | OXYGEN SATURATION: 91 % | RESPIRATION RATE: 16 BRPM | WEIGHT: 230.82 LBS | TEMPERATURE: 98.5 F | SYSTOLIC BLOOD PRESSURE: 151 MMHG

## 2021-12-15 DIAGNOSIS — R93.5 ABNORMAL CT OF THE ABDOMEN: ICD-10-CM

## 2021-12-15 DIAGNOSIS — N39.0 URINARY TRACT INFECTION WITHOUT HEMATURIA, SITE UNSPECIFIED: ICD-10-CM

## 2021-12-15 DIAGNOSIS — R10.31 RLQ ABDOMINAL PAIN: Primary | ICD-10-CM

## 2021-12-15 LAB
ALBUMIN SERPL-MCNC: 4.4 G/DL (ref 3.5–5.2)
ALBUMIN/GLOB SERPL: 1.6 G/DL
ALP SERPL-CCNC: 97 U/L (ref 39–117)
ALT SERPL W P-5'-P-CCNC: 14 U/L (ref 1–41)
ANION GAP SERPL CALCULATED.3IONS-SCNC: 11.3 MMOL/L (ref 5–15)
AST SERPL-CCNC: 13 U/L (ref 1–40)
BACTERIA UR QL AUTO: ABNORMAL /HPF
BASOPHILS # BLD AUTO: 0.03 10*3/MM3 (ref 0–0.2)
BASOPHILS NFR BLD AUTO: 0.3 % (ref 0–1.5)
BILIRUB SERPL-MCNC: 0.3 MG/DL (ref 0–1.2)
BILIRUB UR QL STRIP: NEGATIVE
BUN SERPL-MCNC: 14 MG/DL (ref 8–23)
BUN/CREAT SERPL: 13.7 (ref 7–25)
CALCIUM SPEC-SCNC: 9.8 MG/DL (ref 8.6–10.5)
CHLORIDE SERPL-SCNC: 104 MMOL/L (ref 98–107)
CLARITY UR: ABNORMAL
CO2 SERPL-SCNC: 25.7 MMOL/L (ref 22–29)
COLOR UR: YELLOW
CREAT SERPL-MCNC: 1.02 MG/DL (ref 0.76–1.27)
DEPRECATED RDW RBC AUTO: 40.6 FL (ref 37–54)
EOSINOPHIL # BLD AUTO: 0.11 10*3/MM3 (ref 0–0.4)
EOSINOPHIL NFR BLD AUTO: 1.2 % (ref 0.3–6.2)
ERYTHROCYTE [DISTWIDTH] IN BLOOD BY AUTOMATED COUNT: 12.6 % (ref 12.3–15.4)
GFR SERPL CREATININE-BSD FRML MDRD: 72 ML/MIN/1.73
GLOBULIN UR ELPH-MCNC: 2.8 GM/DL
GLUCOSE SERPL-MCNC: 124 MG/DL (ref 65–99)
GLUCOSE UR STRIP-MCNC: NEGATIVE MG/DL
HCT VFR BLD AUTO: 38.9 % (ref 37.5–51)
HGB BLD-MCNC: 14.1 G/DL (ref 13–17.7)
HGB UR QL STRIP.AUTO: NEGATIVE
HOLD SPECIMEN: NORMAL
HOLD SPECIMEN: NORMAL
HYALINE CASTS UR QL AUTO: ABNORMAL /LPF
IMM GRANULOCYTES # BLD AUTO: 0.03 10*3/MM3 (ref 0–0.05)
IMM GRANULOCYTES NFR BLD AUTO: 0.3 % (ref 0–0.5)
KETONES UR QL STRIP: NEGATIVE
LEUKOCYTE ESTERASE UR QL STRIP.AUTO: ABNORMAL
LIPASE SERPL-CCNC: 24 U/L (ref 13–60)
LYMPHOCYTES # BLD AUTO: 2.26 10*3/MM3 (ref 0.7–3.1)
LYMPHOCYTES NFR BLD AUTO: 23.9 % (ref 19.6–45.3)
MCH RBC QN AUTO: 31.7 PG (ref 26.6–33)
MCHC RBC AUTO-ENTMCNC: 36.2 G/DL (ref 31.5–35.7)
MCV RBC AUTO: 87.4 FL (ref 79–97)
MONOCYTES # BLD AUTO: 0.77 10*3/MM3 (ref 0.1–0.9)
MONOCYTES NFR BLD AUTO: 8.2 % (ref 5–12)
NEUTROPHILS NFR BLD AUTO: 6.24 10*3/MM3 (ref 1.7–7)
NEUTROPHILS NFR BLD AUTO: 66.1 % (ref 42.7–76)
NITRITE UR QL STRIP: NEGATIVE
NRBC BLD AUTO-RTO: 0 /100 WBC (ref 0–0.2)
PH UR STRIP.AUTO: 7 [PH] (ref 5–8)
PLATELET # BLD AUTO: 187 10*3/MM3 (ref 140–450)
PMV BLD AUTO: 9.6 FL (ref 6–12)
POTASSIUM SERPL-SCNC: 4.2 MMOL/L (ref 3.5–5.2)
PROT SERPL-MCNC: 7.2 G/DL (ref 6–8.5)
PROT UR QL STRIP: NEGATIVE
RBC # BLD AUTO: 4.45 10*6/MM3 (ref 4.14–5.8)
RBC # UR STRIP: ABNORMAL /HPF
REF LAB TEST METHOD: ABNORMAL
SODIUM SERPL-SCNC: 141 MMOL/L (ref 136–145)
SP GR UR STRIP: 1.02 (ref 1–1.03)
SQUAMOUS #/AREA URNS HPF: ABNORMAL /HPF
UROBILINOGEN UR QL STRIP: ABNORMAL
WBC # UR STRIP: ABNORMAL /HPF
WBC NRBC COR # BLD: 9.44 10*3/MM3 (ref 3.4–10.8)
WHOLE BLOOD HOLD SPECIMEN: NORMAL
WHOLE BLOOD HOLD SPECIMEN: NORMAL

## 2021-12-15 PROCEDURE — 83690 ASSAY OF LIPASE: CPT

## 2021-12-15 PROCEDURE — 96374 THER/PROPH/DIAG INJ IV PUSH: CPT

## 2021-12-15 PROCEDURE — 80053 COMPREHEN METABOLIC PANEL: CPT

## 2021-12-15 PROCEDURE — 25010000002 KETOROLAC TROMETHAMINE PER 15 MG: Performed by: EMERGENCY MEDICINE

## 2021-12-15 PROCEDURE — 85025 COMPLETE CBC W/AUTO DIFF WBC: CPT

## 2021-12-15 PROCEDURE — 74177 CT ABD & PELVIS W/CONTRAST: CPT

## 2021-12-15 PROCEDURE — 36415 COLL VENOUS BLD VENIPUNCTURE: CPT

## 2021-12-15 PROCEDURE — 0 IOPAMIDOL PER 1 ML: Performed by: EMERGENCY MEDICINE

## 2021-12-15 PROCEDURE — 99283 EMERGENCY DEPT VISIT LOW MDM: CPT

## 2021-12-15 PROCEDURE — 81001 URINALYSIS AUTO W/SCOPE: CPT | Performed by: EMERGENCY MEDICINE

## 2021-12-15 RX ORDER — SODIUM CHLORIDE 0.9 % (FLUSH) 0.9 %
10 SYRINGE (ML) INJECTION AS NEEDED
Status: DISCONTINUED | OUTPATIENT
Start: 2021-12-15 | End: 2021-12-16 | Stop reason: HOSPADM

## 2021-12-15 RX ORDER — KETOROLAC TROMETHAMINE 15 MG/ML
15 INJECTION, SOLUTION INTRAMUSCULAR; INTRAVENOUS ONCE
Status: COMPLETED | OUTPATIENT
Start: 2021-12-15 | End: 2021-12-15

## 2021-12-15 RX ORDER — NAPROXEN 500 MG/1
500 TABLET ORAL 2 TIMES DAILY WITH MEALS
Qty: 14 TABLET | Refills: 0 | Status: SHIPPED | OUTPATIENT
Start: 2021-12-15 | End: 2021-12-16 | Stop reason: SDUPTHER

## 2021-12-15 RX ADMIN — IOPAMIDOL 100 ML: 755 INJECTION, SOLUTION INTRAVENOUS at 22:25

## 2021-12-15 RX ADMIN — KETOROLAC TROMETHAMINE 15 MG: 15 INJECTION, SOLUTION INTRAMUSCULAR; INTRAVENOUS at 21:14

## 2021-12-16 RX ORDER — NAPROXEN 500 MG/1
500 TABLET ORAL 2 TIMES DAILY WITH MEALS
Qty: 14 TABLET | Refills: 0 | Status: SHIPPED | OUTPATIENT
Start: 2021-12-16 | End: 2022-01-13

## 2021-12-16 RX ORDER — NAPROXEN 500 MG/1
500 TABLET ORAL 2 TIMES DAILY WITH MEALS
Qty: 14 TABLET | Refills: 0 | Status: SHIPPED | OUTPATIENT
Start: 2021-12-16 | End: 2021-12-16 | Stop reason: SDUPTHER

## 2021-12-20 ENCOUNTER — OFFICE VISIT (OUTPATIENT)
Dept: SURGERY | Facility: CLINIC | Age: 71
End: 2021-12-20

## 2021-12-20 VITALS — HEIGHT: 67 IN | WEIGHT: 232.8 LBS | BODY MASS INDEX: 36.54 KG/M2 | RESPIRATION RATE: 16 BRPM

## 2021-12-20 DIAGNOSIS — K80.20 GALLSTONES: ICD-10-CM

## 2021-12-20 DIAGNOSIS — R10.31 RIGHT INGUINAL PAIN: Primary | ICD-10-CM

## 2021-12-20 PROCEDURE — 99213 OFFICE O/P EST LOW 20 MIN: CPT | Performed by: SURGERY

## 2021-12-20 RX ORDER — NAPROXEN 500 MG/1
TABLET ORAL
COMMUNITY
Start: 2021-12-16 | End: 2022-01-13

## 2021-12-20 NOTE — PROGRESS NOTES
Chief Complaint:  Cholelithiasis        Primary Care Provider: Samy Wing MD    Referring Provider: Samy Wing MD    History of Present Illness  Christopher Finley is a 71 y.o. male referred by Samy Wing MD for an evaluation for possible hernia.  The electronic amount of records of the referral is for gallstones  But the patient says he is here because he thinks he has a hernia.  He saw Dr. Wing about a week ago when the pain started.  About 3 days after that the patient went to the emergency room.  CT abdomen pelvis was done and it showed a gallstone and a tiny left inguinal hernia and a small umbilical hernia a duodenal diverticulum.  Patient points specifically to his right inguinal crease area as location of his pain.  No bulge at the area.  No obstructive symptoms.  Pain started just 1 week ago and is worse with activity.  Patient is scheduled to have knee replacement surgery in February 2021.  He has had knee pain for quite some time and has not been able to walk normally secondary to the knee pain for over several months.  Patient does not have any problems after eats food.  He does not have any nausea or vomiting, bloating, or right upper quadrant pain.    Allergies: Amoxicillin    Outpatient Medications Marked as Taking for the 12/20/21 encounter (Office Visit) with Yordan Louis MD   Medication Sig Dispense Refill   • Aspirin Low Dose 81 MG EC tablet      • buPROPion XL (WELLBUTRIN XL) 300 MG 24 hr tablet bupropion HCl 300 mg oral tablet extended release 24 hr take 1 tablet (300 mg) by oral route once daily   Active     • carBAMazepine (TEGretol) 200 MG tablet Take 200 mg by mouth Daily.     • cyproheptadine (PERIACTIN) 4 MG tablet Take 4 mg by mouth 2 (two) times a day.     • dilTIAZem (Tiazac) 180 MG 24 hr capsule Tiazac 180 mg oral capsule,extended release 24 hr take 1 capsule (180 mg) by oral route once daily   Active     • escitalopram (Lexapro) 20 MG tablet Lexapro 20 mg oral  "tablet take 1 tablet by oral route 2 times a day   Active     • Hydrocortisone, Perianal, (ANUSOL-HC) 2.5 % rectal cream      • lisinopril (PRINIVIL,ZESTRIL) 10 MG tablet      • metFORMIN (GLUCOPHAGE) 500 MG tablet      • naproxen (EC NAPROSYN) 500 MG EC tablet Take 1 tablet by mouth 2 (Two) Times a Day With Meals. 14 tablet 0   • pantoprazole (PROTONIX) 40 MG EC tablet      • rosuvastatin (CRESTOR) 40 MG tablet      • sulfamethoxazole-trimethoprim (Bactrim DS) 800-160 MG per tablet Every 12 (Twelve) Hours.     • tetrahydrozoline-zinc (VISINE-AC) 0.05-0.25 % ophthalmic solution 1 drop.     • Vitamin D 125 MCG (5000 UT) capsule capsule          Past Medical History:   • Acid reflux   • Anemia   • Arthritis   • Bladder disorder   • Blood disease   • Chronic post-traumatic stress disorder (PTSD)   • Depression   • Diabetes (HCC)   • Diabetes mellitus (HCC)   • GERD (gastroesophageal reflux disease)   • High blood pressure   • High cholesterol   • Hyperlipemia   • Hyperlipidemia   • Hypertension   • Left elbow pain   • Left radial head fracture   • Left shoulder pain   • Left wrist pain   • Mood disorder (HCC)   • Rectal bleeding        Past Surgical History:   • COLONOSCOPY   • HEMORRHOIDECTOMY    BANDED, ,, ,    • INGUINAL HERNIA REPAIR   • TENDON REPAIR    LEFT HAND       Family History:   Family History   Problem Relation Age of Onset   • Heart disease Mother    • Cancer Mother    • Kidney cancer Mother    • Stroke Father    • Kidney cancer Father    • Arthritis Father         Social History:  Social History     Tobacco Use   • Smoking status: Former Smoker     Quit date: 1970     Years since quittin.5   • Smokeless tobacco: Never Used   Substance Use Topics   • Alcohol use: Never       Objective     Vital Signs:  Resp 16   Ht 170.2 cm (67\")   Wt 106 kg (232 lb 12.8 oz)   BMI 36.46 kg/m²   • Constitutional: healthy appearing, alert, no acute distress, reliable historian  • HENT:  PEDRO, " no visible deformities or lesions  • Eyes:  sclerae clear, conjunctivae clear, EOMI  • Neck:  normal appearance, no masses, trachea midline  • Respiratory:  breathing not labored, respiratory effort appears normal  • Cardiovascular:  heart regular rate  • Abdomen:  soft, nontender, nondistended.  Right inguinal area examined with and without Valsalva maneuver.  I am not able to detect a bulge, fascial defect, or any abnormality.  • Skin and subcutaneous tissue:  no visible concerning rashes or lesions, no jaundice  • Musculoskeletal: moving all extremities symmetrically and purposefully  • Neurologic:  no obvious motor or sensory deficits, normal gait, able to stand without difficulty, cerebellar function without any obvious abnormalities, alert & oriented x 3, speech clear  • Psychiatric:  judgment and insight intact, mood normal, affect appropriate, cooperative    Result Review :     []  Laboratory  [x]  Radiology  []  Pathology  []  Microbiology  []  EKG/Telemetry   []  Cardiology/Vascular   []  Old records                Assessment:  1. Right inguinal pain - not detectable inguinal hernia.  Not related to patient's gallstones.  2. Gallstones - seem to be asymptomatic and only and incidental imaging finding    Plan:  Watchful observation  Patient will schedule an appointment to see me again sometime after he has knee surgery if the pain is still present at that time    Yordan Louis MD  12/20/2021    Electronically signed by Yordan Louis MD, 12/20/21, 1:33 PM EST.

## 2021-12-23 ENCOUNTER — APPOINTMENT (OUTPATIENT)
Dept: GENERAL RADIOLOGY | Facility: HOSPITAL | Age: 71
End: 2021-12-23

## 2021-12-23 ENCOUNTER — HOSPITAL ENCOUNTER (EMERGENCY)
Facility: HOSPITAL | Age: 71
Discharge: HOME | End: 2021-12-24
Attending: EMERGENCY MEDICINE | Admitting: EMERGENCY MEDICINE

## 2021-12-23 VITALS
SYSTOLIC BLOOD PRESSURE: 167 MMHG | HEART RATE: 67 BPM | WEIGHT: 228.18 LBS | HEIGHT: 67 IN | RESPIRATION RATE: 20 BRPM | DIASTOLIC BLOOD PRESSURE: 70 MMHG | TEMPERATURE: 98.4 F | BODY MASS INDEX: 35.81 KG/M2 | OXYGEN SATURATION: 99 %

## 2021-12-23 DIAGNOSIS — K59.00 CONSTIPATION, UNSPECIFIED CONSTIPATION TYPE: Primary | ICD-10-CM

## 2021-12-23 PROCEDURE — 63710000001 ONDANSETRON ODT 4 MG TABLET DISPERSIBLE: Performed by: REGISTERED NURSE

## 2021-12-23 PROCEDURE — 99283 EMERGENCY DEPT VISIT LOW MDM: CPT

## 2021-12-23 PROCEDURE — 74018 RADEX ABDOMEN 1 VIEW: CPT

## 2021-12-23 RX ORDER — ONDANSETRON 4 MG/1
4 TABLET, ORALLY DISINTEGRATING ORAL ONCE
Status: COMPLETED | OUTPATIENT
Start: 2021-12-23 | End: 2021-12-23

## 2021-12-23 RX ADMIN — Medication 300 ML: at 23:19

## 2021-12-23 RX ADMIN — ONDANSETRON 4 MG: 4 TABLET, ORALLY DISINTEGRATING ORAL at 23:10

## 2021-12-24 NOTE — ED PROVIDER NOTES
Time: 9:36 PM EST  Arrived by: FRANKI  Chief Complaint: Constipation  History provided by: Patient    History of Present Illness:  Patient is a 71 y.o. year old male that presents to the emergency department with constipation for 1 week.  He states he took some Ex-Lax at home yesterday and increase his fluid intake which really has not helped at all.  He denies recent diet change.       used: No    Constipation  Severity:  Moderate  Time since last bowel movement:  7 days  Timing:  Constant  Progression:  Worsening  Chronicity:  New  Context: not medication    Stool description:  None produced  Relieved by:  Nothing  Worsened by:  Nothing  Ineffective treatments:  None tried  Associated symptoms: abdominal pain and nausea    Associated symptoms: no diarrhea, no fever and no vomiting    Abdominal pain:     Location:  Generalized    Quality: aching      Severity:  Mild    Onset quality:  Gradual    Duration:  7 days    Timing:  Constant    Progression:  Worsening    Chronicity:  New  Nausea  The primary symptoms include abdominal pain and nausea. Primary symptoms do not include fever, vomiting or diarrhea.   The illness is also significant for constipation. The illness does not include chills.           Similar Symptoms Previously: No  Recently seen: No      Patient Care Team  Primary Care Provider: Dr. Shell    Past Medical History:     Allergies   Allergen Reactions   • Amoxicillin Hives and Rash     Other reaction(s): rash     Past Medical History:   Diagnosis Date   • Acid reflux    • Anemia    • Arthritis    • Bladder disorder    • Blood disease    • Chronic post-traumatic stress disorder (PTSD)    • Depression    • Diabetes (HCC)    • Diabetes mellitus (HCC)    • GERD (gastroesophageal reflux disease)    • High blood pressure    • High cholesterol    • Hyperlipemia    • Hyperlipidemia    • Hypertension    • Left elbow pain 06/18/2018   • Left radial head fracture 05/17/2018   • Left shoulder  pain 05/17/2018   • Left wrist pain 06/22/2018   • Mood disorder (HCC)    • Rectal bleeding      Past Surgical History:   Procedure Laterality Date   • COLONOSCOPY     • HEMORRHOIDECTOMY      BANDED, 2004,2006, 2007, 2020   • INGUINAL HERNIA REPAIR  1998   • TENDON REPAIR      LEFT HAND     Family History   Problem Relation Age of Onset   • Heart disease Mother    • Cancer Mother    • Kidney cancer Mother    • Stroke Father    • Kidney cancer Father    • Arthritis Father        Home Medications:  Prior to Admission medications    Medication Sig Start Date End Date Taking? Authorizing Provider   Aspirin Low Dose 81 MG EC tablet  6/18/21   Emergency, Nurse Oumou, RN   buPROPion XL (WELLBUTRIN XL) 300 MG 24 hr tablet bupropion HCl 300 mg oral tablet extended release 24 hr take 1 tablet (300 mg) by oral route once daily   Active    Emergency, Nurse Oumou RN   carBAMazepine (TEGretol) 200 MG tablet Take 200 mg by mouth Daily.    Provider, MD Franchesca   cyproheptadine (PERIACTIN) 4 MG tablet Take 4 mg by mouth 2 (two) times a day.    Provider, MD Franchesca   dilTIAZem (Tiazac) 180 MG 24 hr capsule Tiazac 180 mg oral capsule,extended release 24 hr take 1 capsule (180 mg) by oral route once daily   Active    Emergency, Nurse Oumou RN   escitalopram (Lexapro) 20 MG tablet Lexapro 20 mg oral tablet take 1 tablet by oral route 2 times a day   Active    Emergency, Nurse Oumou RN   Hydrocortisone, Perianal, (ANUSOL-HC) 2.5 % rectal cream  10/19/21   Provider, MD Franchesca   lisinopril (PRINIVIL,ZESTRIL) 10 MG tablet  6/18/21   Emergency, Nurse Oumou RN   metFORMIN (GLUCOPHAGE) 500 MG tablet  6/18/21   Emergency, Nurse Epic, RN   naproxen (EC NAPROSYN) 500 MG EC tablet Take 1 tablet by mouth 2 (Two) Times a Day With Meals. 12/16/21   Black Huitron DO   naproxen (NAPROSYN) 500 MG tablet  12/16/21   ProviderFranchesca MD   pantoprazole (PROTONIX) 40 MG EC tablet  6/18/21   Emergency, Nurse Oumou RN   rosuvastatin (CRESTOR)  "40 MG tablet  6/18/21   Emergency, Nurse Oumou, RN   sulfamethoxazole-trimethoprim (Bactrim DS) 800-160 MG per tablet Every 12 (Twelve) Hours. 12/13/21   Emergency, Nurse Oumou, RN   tetrahydrozoline-zinc (VISINE-AC) 0.05-0.25 % ophthalmic solution 1 drop.    Emergency, Nurse Epic, RN   Vitamin D 125 MCG (5000 UT) capsule capsule  6/18/21   Emergency, Nurse Oumou, RN        Social History:   PT  reports that he quit smoking about 51 years ago. He has never used smokeless tobacco. He reports that he does not drink alcohol and does not use drugs.    Record Review:  I have reviewed the patient's records in [facility].     Review of Systems  Review of Systems   Constitutional: Negative for chills and fever.   HENT: Negative for congestion, ear pain and sore throat.    Eyes: Negative for pain.   Respiratory: Negative for cough, chest tightness and shortness of breath.    Cardiovascular: Negative for chest pain.   Gastrointestinal: Positive for abdominal pain, constipation and nausea. Negative for blood in stool, diarrhea and vomiting.   Genitourinary: Negative for flank pain and hematuria.   Musculoskeletal: Negative for joint swelling.   Skin: Negative for pallor.   Neurological: Negative for seizures and headaches.   All other systems reviewed and are negative.       Physical Exam    /70 (BP Location: Right arm, Patient Position: Sitting)   Pulse 67   Temp 98.4 °F (36.9 °C) (Oral)   Resp 20   Ht 170.2 cm (67\")   Wt 104 kg (228 lb 2.8 oz)   SpO2 99%   BMI 35.74 kg/m²     Physical Exam  Vitals and nursing note reviewed.   Constitutional:       General: He is not in acute distress.     Appearance: Normal appearance. He is obese. He is not toxic-appearing.   HENT:      Head: Normocephalic and atraumatic.      Mouth/Throat:      Mouth: Mucous membranes are moist.   Eyes:      General: No scleral icterus.  Cardiovascular:      Rate and Rhythm: Normal rate and regular rhythm.      Pulses: Normal pulses.      Heart sounds: " "Normal heart sounds.   Pulmonary:      Effort: Pulmonary effort is normal. No respiratory distress.      Breath sounds: Normal breath sounds. No wheezing or rhonchi.   Abdominal:      General: Abdomen is protuberant. Bowel sounds are normal. There is no distension.      Palpations: Abdomen is soft. There is no mass or pulsatile mass.      Tenderness: There is abdominal tenderness in the right lower quadrant and left lower quadrant. There is no right CVA tenderness, left CVA tenderness, guarding or rebound.      Comments: No rigidity   Musculoskeletal:         General: Normal range of motion.      Cervical back: Normal range of motion and neck supple.   Skin:     General: Skin is warm and dry.   Neurological:      Mental Status: He is alert and oriented to person, place, and time. Mental status is at baseline.                  ED Course  /70 (BP Location: Right arm, Patient Position: Sitting)   Pulse 67   Temp 98.4 °F (36.9 °C) (Oral)   Resp 20   Ht 170.2 cm (67\")   Wt 104 kg (228 lb 2.8 oz)   SpO2 99%   BMI 35.74 kg/m²   Results for orders placed or performed during the hospital encounter of 12/15/21   Comprehensive Metabolic Panel    Specimen: Arm, Right; Blood   Result Value Ref Range    Glucose 124 (H) 65 - 99 mg/dL    BUN 14 8 - 23 mg/dL    Creatinine 1.02 0.76 - 1.27 mg/dL    Sodium 141 136 - 145 mmol/L    Potassium 4.2 3.5 - 5.2 mmol/L    Chloride 104 98 - 107 mmol/L    CO2 25.7 22.0 - 29.0 mmol/L    Calcium 9.8 8.6 - 10.5 mg/dL    Total Protein 7.2 6.0 - 8.5 g/dL    Albumin 4.40 3.50 - 5.20 g/dL    ALT (SGPT) 14 1 - 41 U/L    AST (SGOT) 13 1 - 40 U/L    Alkaline Phosphatase 97 39 - 117 U/L    Total Bilirubin 0.3 0.0 - 1.2 mg/dL    eGFR Non African Amer 72 >60 mL/min/1.73    Globulin 2.8 gm/dL    A/G Ratio 1.6 g/dL    BUN/Creatinine Ratio 13.7 7.0 - 25.0    Anion Gap 11.3 5.0 - 15.0 mmol/L   Lipase    Specimen: Arm, Right; Blood   Result Value Ref Range    Lipase 24 13 - 60 U/L   Urinalysis With " Microscopic If Indicated (No Culture) - Urine, Clean Catch    Specimen: Urine, Clean Catch   Result Value Ref Range    Color, UA Yellow Yellow, Straw    Appearance, UA Cloudy (A) Clear    pH, UA 7.0 5.0 - 8.0    Specific Gravity, UA 1.025 1.005 - 1.030    Glucose, UA Negative Negative    Ketones, UA Negative Negative    Bilirubin, UA Negative Negative    Blood, UA Negative Negative    Protein, UA Negative Negative    Leuk Esterase, UA Trace (A) Negative    Nitrite, UA Negative Negative    Urobilinogen, UA 1.0 E.U./dL 0.2 - 1.0 E.U./dL   CBC Auto Differential    Specimen: Arm, Right; Blood   Result Value Ref Range    WBC 9.44 3.40 - 10.80 10*3/mm3    RBC 4.45 4.14 - 5.80 10*6/mm3    Hemoglobin 14.1 13.0 - 17.7 g/dL    Hematocrit 38.9 37.5 - 51.0 %    MCV 87.4 79.0 - 97.0 fL    MCH 31.7 26.6 - 33.0 pg    MCHC 36.2 (H) 31.5 - 35.7 g/dL    RDW 12.6 12.3 - 15.4 %    RDW-SD 40.6 37.0 - 54.0 fl    MPV 9.6 6.0 - 12.0 fL    Platelets 187 140 - 450 10*3/mm3    Neutrophil % 66.1 42.7 - 76.0 %    Lymphocyte % 23.9 19.6 - 45.3 %    Monocyte % 8.2 5.0 - 12.0 %    Eosinophil % 1.2 0.3 - 6.2 %    Basophil % 0.3 0.0 - 1.5 %    Immature Grans % 0.3 0.0 - 0.5 %    Neutrophils, Absolute 6.24 1.70 - 7.00 10*3/mm3    Lymphocytes, Absolute 2.26 0.70 - 3.10 10*3/mm3    Monocytes, Absolute 0.77 0.10 - 0.90 10*3/mm3    Eosinophils, Absolute 0.11 0.00 - 0.40 10*3/mm3    Basophils, Absolute 0.03 0.00 - 0.20 10*3/mm3    Immature Grans, Absolute 0.03 0.00 - 0.05 10*3/mm3    nRBC 0.0 0.0 - 0.2 /100 WBC   Urinalysis, Microscopic Only - Urine, Clean Catch    Specimen: Urine, Clean Catch   Result Value Ref Range    RBC, UA 0-2 (A) None Seen /HPF    WBC, UA 13-20 (A) None Seen /HPF    Bacteria, UA None Seen None Seen /HPF    Squamous Epithelial Cells, UA 0-2 None Seen, 0-2 /HPF    Hyaline Casts, UA 0-2 None Seen /LPF    Methodology Automated Microscopy    Green Top (Gel)   Result Value Ref Range    Extra Tube Hold for add-ons.    Lavender Top    Result Value Ref Range    Extra Tube hold for add-on    Gold Top - SST   Result Value Ref Range    Extra Tube Hold for add-ons.    Light Blue Top   Result Value Ref Range    Extra Tube hold for add-on      Medications   molasses enema (300 mL Rectal Given 12/23/21 2319)   ondansetron ODT (ZOFRAN-ODT) disintegrating tablet 4 mg (4 mg Oral Given 12/23/21 2310)     CT Abdomen Pelvis With Contrast    Result Date: 12/15/2021  Narrative: PROCEDURE: CT ABDOMEN PELVIS W CONTRAST  COMPARISON: 12/5/2020.  INDICATIONS: WORSENING RLQ ABDOMINAL PAIN OVER THE PAST 2 DAYS; POSSIBLE APPENDICITIS; SYMPTOMS BEGAN APPROXIMATELY 1 WEEK PREVIOUSLY.  TECHNIQUE: After obtaining the patient's consent, 737 CT images were created with non-ionic intravenous contrast material.  No oral contrast agent was administered for the study.  PROTOCOL:   Standard imaging protocol performed    RADIATION:   DLP: 1,270.9 mGy*cm   Automated exposure control was utilized to minimize radiation dose. CONTRAST: 100 mL Isovue 370 I.V.  FINDINGS: There are gallstones with possible mild gallbladder wall thickening.  No pericholecystic fluid is seen.  Age-indeterminate cholecystitis is possible.  No biliary ductal dilatation is suggested by CT.  No acute pancreatitis is seen.  Please correlate with pertinent lab values.  Gallstones were seen on the prior study.  There is a large duodenal diverticulum, which is filled with fluid and debris and foci of gas.  It involves the 3rd portion of the duodenum and measures approximately 5.6 x 3.8 x 3.3 cm in transverse, craniocaudal, and AP (anteroposterior) extent, respectively.  It extends superiorly from the 3rd portion of the duodenum.  Minimal, if any, bowel wall thickening is suggested with this finding.  A similar finding was seen previously.  No definite acute duodenal diverticulitis or duodenitis is suggested.  No mechanical bowel obstruction.  No pathologic bowel wall thickening is seen elsewhere.  No  pneumoperitoneum or pneumatosis.  No portal or mesenteric venous gas.  The appendix is within normal limits, well seen on axial image 62 of series 201 and adjacent images.  No acute colitis or colonic diverticulitis.  No renal/ureteral stones or hydronephrosis is seen.  No obstructive uropathy is identified.  No acute pyelonephritis.  No ascites.  There is extensive atherosclerotic change.  No aneurysmal dilatation of the aortoiliac arterial system.  No acute intraperitoneal or retroperitoneal hemorrhage.  No enlarged intra-abdominal or intrapelvic lymph nodes.  No adrenal mass.  No acute findings are seen with regard to the liver or spleen.  No acute fracture.  No aggressive osseous lesion.  Degenerative changes are seen throughout the imaged spine.  There may be diffuse idiopathic skeletal hyperostosis (DISH).  Chronic anterolisthesis is seen at L4-5, estimated at 8 mm.  There may be mild chronic retrolisthesis at L1-2 and L2-3, estimated at 5 mm or less at each level.  Degenerative changes involve the bilateral sacroiliac joints with anterior bony bridging osteophyte formation as seen on image 79 of series 201 and adjacent images.  No acute fracture.  No aggressive osseous lesion is suggested.  No acute infiltrate is seen in the partially imaged lung bases.  Atelectasis and/or fibrosis is (are) suggested in the lung bases.  There may be borderline cardiac enlargement.  A tiny hiatal hernia is suggested.  There are pelvic phleboliths.  There is mild distension of the urinary bladder.  There may be mild prostatomegaly.  Please correlate with pertinent lab values.  The previously seen possible cystitis/prostatitis is (are) thought to have resolved.  The previously seen small amount of fluid in the left-sided iliopsoas bursa persists.  There may be some degree of persistent left-sided iliopsoas bursitis.  A small umbilical hernia is seen, measuring about 1.6 cm in craniocaudal extent.  It contains fat and no bowel.   A small left inguinal hernia is possible.  It contains fat and vessels and no bowel and measures about 3.7 cm in transverse diameter on image 99 of series 201.  CONCLUSION:  1. Gallstones are seen with possible gallbladder wall thickening.  No definite pericholecystic fluid.  No biliary ductal dilatation.  Age-indeterminate cholecystitis cannot be excluded.  No acute pancreatitis.  2. There is a 5.6 cm duodenal diverticulum, involving the 3rd portion of the duodenum, containing fluid, gas, and debris.  No definite CT evidence of acute duodenal diverticulitis or acute duodenitis.  No duodenal obstruction is seen.  A similar finding was seen on the 12/5/2020 study.  No pneumoperitoneum or pneumatosis.  3. No mechanical bowel obstruction is suspected.  No acute colitis or diverticulitis.  No acute appendicitis.  4. Otherwise, no acute findings are seen in the abdomen or pelvis by enhanced CT examination.  5. Please see above comments for further detail.     SHWETHA ALMANZA JR, MD       Electronically Signed and Approved By: SHWETHA ALMANZA JR, MD on 12/15/2021 at 23:03             XR Abdomen KUB    Result Date: 12/23/2021  Narrative: PROCEDURE: XR ABDOMEN KUB  COMPARISON: None.  INDICATIONS: CONSTIPATION; NO BM X1 WEEK; PASSING MUCUS.  FINDINGS: Three supine abdominal/pelvic views (KUBs) reveal a nonobstructive bowel gas pattern.  Formed stool is seen within the colon, especially the right colon.  External artifacts obscure detail.  Borderline cardiac enlargement is possible.  The partially imaged lung bases are clear of acute infiltrate.  There are degenerative changes of the imaged spine.  Arterial calcifications are seen.  There are pelvic phleboliths.  There may be chronic calcified granulomatous disease of the spleen.  The bowel gas obscures the renal shadows.  CONCLUSION: A nonobstructive bowel gas pattern is noted.      SHWETHA ALMANZA JR, MD       Electronically Signed and Approved By: SHWETHA ALMANZA JR, MD on  12/23/2021 at 22:06               Procedures/EKGs:  Procedures    Medical Decision Making:                     MDM  Number of Diagnoses or Management Options  Constipation, unspecified constipation type: new and requires workup  Diagnosis management comments: Patient had some results with enema.  He will likely benefit greater from oral dose as formed stool extends into the right colon.  At this time I do not believe that the patient has an acute emergency medical condition requiring additional emergency management. The patient is currently stable for outpatient treatment and continuation of care. Important signs and symptoms that would warrant return to the emergency department were reviewed. The patient was provided the opportunity to ask questions. All questions were addressed and the patient was discharged from the ED. The patient demonstrated understanding and agreed to plan.       Amount and/or Complexity of Data Reviewed  Tests in the radiology section of CPT®: reviewed and ordered    Risk of Complications, Morbidity, and/or Mortality  Presenting problems: minimal  Diagnostic procedures: minimal  Management options: minimal    Patient Progress  Patient progress: stable       Final diagnoses:   Constipation, unspecified constipation type        Disposition:  ED Disposition     ED Disposition Condition Comment    Discharge Stable            Sofía Lindo, APRN  12/23/21 6784

## 2021-12-24 NOTE — ED TRIAGE NOTES
"Pt to ED from home with reports of no BM x1 week.  Pt states he has been taking ex lax at home but \"only pass mucous\".    Pt has been seen by surgeon for hernia evalulation and was cleared.      Pt denies abd pain, denies rectal pain, reports nausea and vomiting \"only when I put my finger down my throat to vomit because it feels like I gotta go but nothing comes out\".  "

## 2021-12-24 NOTE — DISCHARGE INSTRUCTIONS
Drink plenty of fluids.  Eat a fiber rich diet.  You may take a dietary fiber supplement as well to increase focus your stool.    To treat your current episode of constipation you can take the magnesium citrate that you already have.    Be sure to follow-up with your primary care provider next week.

## 2022-01-10 ENCOUNTER — HOSPITAL ENCOUNTER (EMERGENCY)
Facility: HOSPITAL | Age: 72
Discharge: HOME | End: 2022-01-11
Attending: EMERGENCY MEDICINE | Admitting: EMERGENCY MEDICINE

## 2022-01-10 ENCOUNTER — APPOINTMENT (OUTPATIENT)
Dept: CT IMAGING | Facility: HOSPITAL | Age: 72
End: 2022-01-10

## 2022-01-10 ENCOUNTER — APPOINTMENT (OUTPATIENT)
Dept: ULTRASOUND IMAGING | Facility: HOSPITAL | Age: 72
End: 2022-01-10

## 2022-01-10 VITALS
OXYGEN SATURATION: 99 % | SYSTOLIC BLOOD PRESSURE: 124 MMHG | HEIGHT: 67 IN | DIASTOLIC BLOOD PRESSURE: 61 MMHG | TEMPERATURE: 97.9 F | HEART RATE: 54 BPM | WEIGHT: 222.22 LBS | RESPIRATION RATE: 20 BRPM | BODY MASS INDEX: 34.88 KG/M2

## 2022-01-10 DIAGNOSIS — R53.83 MALAISE AND FATIGUE: ICD-10-CM

## 2022-01-10 DIAGNOSIS — K80.20 GALLSTONES: ICD-10-CM

## 2022-01-10 DIAGNOSIS — R10.30 LOWER ABDOMINAL PAIN: Primary | ICD-10-CM

## 2022-01-10 DIAGNOSIS — R53.81 MALAISE AND FATIGUE: ICD-10-CM

## 2022-01-10 DIAGNOSIS — F41.8 ANXIETY ABOUT HEALTH: ICD-10-CM

## 2022-01-10 LAB
ALBUMIN SERPL-MCNC: 4.5 G/DL (ref 3.5–5.2)
ALBUMIN/GLOB SERPL: 1.7 G/DL
ALP SERPL-CCNC: 87 U/L (ref 39–117)
ALT SERPL W P-5'-P-CCNC: 20 U/L (ref 1–41)
ANION GAP SERPL CALCULATED.3IONS-SCNC: 11 MMOL/L (ref 5–15)
AST SERPL-CCNC: 17 U/L (ref 1–40)
BASOPHILS # BLD AUTO: 0.02 10*3/MM3 (ref 0–0.2)
BASOPHILS NFR BLD AUTO: 0.2 % (ref 0–1.5)
BILIRUB SERPL-MCNC: 0.3 MG/DL (ref 0–1.2)
BILIRUB UR QL STRIP: NEGATIVE
BUN SERPL-MCNC: 16 MG/DL (ref 8–23)
BUN/CREAT SERPL: 16.3 (ref 7–25)
CALCIUM SPEC-SCNC: 10.2 MG/DL (ref 8.6–10.5)
CARBAMAZEPINE SERPL-MCNC: <2 MCG/ML (ref 4–12)
CHLORIDE SERPL-SCNC: 107 MMOL/L (ref 98–107)
CLARITY UR: CLEAR
CO2 SERPL-SCNC: 26 MMOL/L (ref 22–29)
COLOR UR: YELLOW
CREAT SERPL-MCNC: 0.98 MG/DL (ref 0.76–1.27)
DEPRECATED RDW RBC AUTO: 42.5 FL (ref 37–54)
EOSINOPHIL # BLD AUTO: 0.07 10*3/MM3 (ref 0–0.4)
EOSINOPHIL NFR BLD AUTO: 0.8 % (ref 0.3–6.2)
ERYTHROCYTE [DISTWIDTH] IN BLOOD BY AUTOMATED COUNT: 13.1 % (ref 12.3–15.4)
GFR SERPL CREATININE-BSD FRML MDRD: 75 ML/MIN/1.73
GLOBULIN UR ELPH-MCNC: 2.6 GM/DL
GLUCOSE SERPL-MCNC: 122 MG/DL (ref 65–99)
GLUCOSE UR STRIP-MCNC: NEGATIVE MG/DL
HCT VFR BLD AUTO: 40.9 % (ref 37.5–51)
HGB BLD-MCNC: 14.3 G/DL (ref 13–17.7)
HGB UR QL STRIP.AUTO: NEGATIVE
HOLD SPECIMEN: NORMAL
HOLD SPECIMEN: NORMAL
IMM GRANULOCYTES # BLD AUTO: 0.01 10*3/MM3 (ref 0–0.05)
IMM GRANULOCYTES NFR BLD AUTO: 0.1 % (ref 0–0.5)
KETONES UR QL STRIP: ABNORMAL
LEUKOCYTE ESTERASE UR QL STRIP.AUTO: NEGATIVE
LIPASE SERPL-CCNC: 28 U/L (ref 13–60)
LYMPHOCYTES # BLD AUTO: 1.98 10*3/MM3 (ref 0.7–3.1)
LYMPHOCYTES NFR BLD AUTO: 23.5 % (ref 19.6–45.3)
MCH RBC QN AUTO: 31.1 PG (ref 26.6–33)
MCHC RBC AUTO-ENTMCNC: 35 G/DL (ref 31.5–35.7)
MCV RBC AUTO: 88.9 FL (ref 79–97)
MONOCYTES # BLD AUTO: 0.7 10*3/MM3 (ref 0.1–0.9)
MONOCYTES NFR BLD AUTO: 8.3 % (ref 5–12)
NEUTROPHILS NFR BLD AUTO: 5.65 10*3/MM3 (ref 1.7–7)
NEUTROPHILS NFR BLD AUTO: 67.1 % (ref 42.7–76)
NITRITE UR QL STRIP: NEGATIVE
NRBC BLD AUTO-RTO: 0 /100 WBC (ref 0–0.2)
PH UR STRIP.AUTO: 7 [PH] (ref 5–8)
PLATELET # BLD AUTO: 175 10*3/MM3 (ref 140–450)
PMV BLD AUTO: 9.5 FL (ref 6–12)
POTASSIUM SERPL-SCNC: 4.6 MMOL/L (ref 3.5–5.2)
PROT SERPL-MCNC: 7.1 G/DL (ref 6–8.5)
PROT UR QL STRIP: NEGATIVE
RBC # BLD AUTO: 4.6 10*6/MM3 (ref 4.14–5.8)
SARS-COV-2 RNA RESP QL NAA+PROBE: NOT DETECTED
SODIUM SERPL-SCNC: 144 MMOL/L (ref 136–145)
SP GR UR STRIP: 1.02 (ref 1–1.03)
UROBILINOGEN UR QL STRIP: ABNORMAL
WBC NRBC COR # BLD: 8.43 10*3/MM3 (ref 3.4–10.8)
WHOLE BLOOD HOLD SPECIMEN: NORMAL
WHOLE BLOOD HOLD SPECIMEN: NORMAL

## 2022-01-10 PROCEDURE — 76705 ECHO EXAM OF ABDOMEN: CPT

## 2022-01-10 PROCEDURE — 96374 THER/PROPH/DIAG INJ IV PUSH: CPT

## 2022-01-10 PROCEDURE — 0 IOPAMIDOL PER 1 ML: Performed by: EMERGENCY MEDICINE

## 2022-01-10 PROCEDURE — U0005 INFEC AGEN DETEC AMPLI PROBE: HCPCS | Performed by: EMERGENCY MEDICINE

## 2022-01-10 PROCEDURE — 36415 COLL VENOUS BLD VENIPUNCTURE: CPT

## 2022-01-10 PROCEDURE — 74177 CT ABD & PELVIS W/CONTRAST: CPT

## 2022-01-10 PROCEDURE — 99283 EMERGENCY DEPT VISIT LOW MDM: CPT

## 2022-01-10 PROCEDURE — U0003 INFECTIOUS AGENT DETECTION BY NUCLEIC ACID (DNA OR RNA); SEVERE ACUTE RESPIRATORY SYNDROME CORONAVIRUS 2 (SARS-COV-2) (CORONAVIRUS DISEASE [COVID-19]), AMPLIFIED PROBE TECHNIQUE, MAKING USE OF HIGH THROUGHPUT TECHNOLOGIES AS DESCRIBED BY CMS-2020-01-R: HCPCS | Performed by: EMERGENCY MEDICINE

## 2022-01-10 PROCEDURE — 81003 URINALYSIS AUTO W/O SCOPE: CPT | Performed by: EMERGENCY MEDICINE

## 2022-01-10 PROCEDURE — 83690 ASSAY OF LIPASE: CPT

## 2022-01-10 PROCEDURE — 96375 TX/PRO/DX INJ NEW DRUG ADDON: CPT

## 2022-01-10 PROCEDURE — 80053 COMPREHEN METABOLIC PANEL: CPT

## 2022-01-10 PROCEDURE — 25010000002 ONDANSETRON PER 1 MG: Performed by: EMERGENCY MEDICINE

## 2022-01-10 PROCEDURE — 80156 ASSAY CARBAMAZEPINE TOTAL: CPT | Performed by: EMERGENCY MEDICINE

## 2022-01-10 PROCEDURE — 25010000002 FENTANYL CITRATE (PF) 50 MCG/ML SOLUTION: Performed by: EMERGENCY MEDICINE

## 2022-01-10 PROCEDURE — 85025 COMPLETE CBC W/AUTO DIFF WBC: CPT

## 2022-01-10 RX ORDER — ONDANSETRON 4 MG/1
4 TABLET, ORALLY DISINTEGRATING ORAL EVERY 8 HOURS PRN
Qty: 9 TABLET | Refills: 0 | Status: SHIPPED | OUTPATIENT
Start: 2022-01-10 | End: 2022-01-13

## 2022-01-10 RX ORDER — ONDANSETRON 2 MG/ML
4 INJECTION INTRAMUSCULAR; INTRAVENOUS ONCE
Status: COMPLETED | OUTPATIENT
Start: 2022-01-10 | End: 2022-01-10

## 2022-01-10 RX ORDER — SODIUM CHLORIDE 0.9 % (FLUSH) 0.9 %
10 SYRINGE (ML) INJECTION AS NEEDED
Status: DISCONTINUED | OUTPATIENT
Start: 2022-01-10 | End: 2022-01-11 | Stop reason: HOSPADM

## 2022-01-10 RX ORDER — ACETAMINOPHEN 325 MG/1
975 TABLET ORAL ONCE
Status: COMPLETED | OUTPATIENT
Start: 2022-01-10 | End: 2022-01-10

## 2022-01-10 RX ORDER — FENTANYL CITRATE 50 UG/ML
25 INJECTION, SOLUTION INTRAMUSCULAR; INTRAVENOUS ONCE
Status: COMPLETED | OUTPATIENT
Start: 2022-01-10 | End: 2022-01-10

## 2022-01-10 RX ADMIN — ACETAMINOPHEN 975 MG: 325 TABLET ORAL at 19:29

## 2022-01-10 RX ADMIN — IOPAMIDOL 100 ML: 755 INJECTION, SOLUTION INTRAVENOUS at 19:40

## 2022-01-10 RX ADMIN — ONDANSETRON 4 MG: 2 INJECTION INTRAMUSCULAR; INTRAVENOUS at 19:29

## 2022-01-10 RX ADMIN — FENTANYL CITRATE 25 MCG: 50 INJECTION INTRAMUSCULAR; INTRAVENOUS at 19:29

## 2022-01-10 RX ADMIN — SODIUM CHLORIDE 1000 ML: 9 INJECTION, SOLUTION INTRAVENOUS at 19:33

## 2022-01-10 NOTE — ED PROVIDER NOTES
Subjective     Fatigue  Associated symptoms: abdominal pain, diarrhea, fatigue, nausea and vomiting    Associated symptoms: no chest pain and no cough    Weakness - Generalized  Associated symptoms: abdominal pain, diarrhea, nausea and vomiting    Associated symptoms: no chest pain, no cough and no hematochezia    Nausea  The primary symptoms include fatigue, abdominal pain, nausea, vomiting and diarrhea. Primary symptoms do not include hematemesis or hematochezia.   Diarrhea  The primary symptoms include fatigue, abdominal pain, nausea, vomiting and diarrhea. Primary symptoms do not include hematemesis or hematochezia.   Abdominal Pain  Pain location:  RLQ and LLQ  Pain quality: aching and cramping    Pain radiates to:  Does not radiate  Pain severity:  Mild  Onset quality:  Gradual  Duration:  3 weeks  Timing:  Intermittent  Progression:  Waxing and waning  Chronicity:  New  Context comment:  History of gallstones.  Has already seen a general surgeon who did not feel compelled to take out his gallbladder.  Relieved by:  Nothing  Worsened by:  Eating  Ineffective treatments:  OTC medications  Associated symptoms: diarrhea, fatigue, nausea and vomiting    Associated symptoms: no chest pain, no cough, no hematemesis and no hematochezia        Review of Systems   Constitutional: Positive for fatigue.   Respiratory: Negative for cough.    Cardiovascular: Negative for chest pain.   Gastrointestinal: Positive for abdominal pain, diarrhea, nausea and vomiting. Negative for hematemesis and hematochezia.   All other systems reviewed and are negative.      Past Medical History:   Diagnosis Date   • Acid reflux    • Anemia    • Arthritis    • Bladder disorder    • Blood disease    • Chronic post-traumatic stress disorder (PTSD)    • Depression    • Diabetes (HCC)    • Diabetes mellitus (HCC)    • GERD (gastroesophageal reflux disease)    • High blood pressure    • High cholesterol    • Hyperlipemia    • Hyperlipidemia    •  Hypertension    • Left elbow pain 2018   • Left radial head fracture 2018   • Left shoulder pain 2018   • Left wrist pain 2018   • Mood disorder (HCC)    • Rectal bleeding        Allergies   Allergen Reactions   • Amoxicillin Hives and Rash     Other reaction(s): rash       Past Surgical History:   Procedure Laterality Date   • COLONOSCOPY     • HEMORRHOIDECTOMY      BANDED, ,, ,    • INGUINAL HERNIA REPAIR     • TENDON REPAIR      LEFT HAND       Family History   Problem Relation Age of Onset   • Heart disease Mother    • Cancer Mother    • Kidney cancer Mother    • Stroke Father    • Kidney cancer Father    • Arthritis Father        Social History     Socioeconomic History   • Marital status:    Tobacco Use   • Smoking status: Former Smoker     Quit date: 1970     Years since quittin.5   • Smokeless tobacco: Never Used   Vaping Use   • Vaping Use: Never used   Substance and Sexual Activity   • Alcohol use: Never   • Drug use: Never   • Sexual activity: Defer           Objective   Physical Exam  Vitals and nursing note reviewed.   Constitutional:       Appearance: He is obese. He is not ill-appearing.   HENT:      Head: Normocephalic.      Right Ear: External ear normal.      Left Ear: External ear normal.      Nose: Nose normal.      Mouth/Throat:      Mouth: Mucous membranes are moist.      Pharynx: Oropharynx is clear.   Eyes:      General: No scleral icterus.  Cardiovascular:      Rate and Rhythm: Regular rhythm. Bradycardia present.   Pulmonary:      Effort: Pulmonary effort is normal.      Breath sounds: Normal breath sounds.   Abdominal:      General: Abdomen is flat.      Tenderness: There is no abdominal tenderness. There is no guarding.   Musculoskeletal:         General: No deformity or signs of injury.   Skin:     General: Skin is warm and dry.   Neurological:      Mental Status: He is alert and oriented to person, place, and time.    Psychiatric:      Comments: Anxious         Procedures           ED Course                                                 MDM  Number of Diagnoses or Management Options     Amount and/or Complexity of Data Reviewed  Clinical lab tests: reviewed  Tests in the radiology section of CPT®: reviewed  Decide to obtain previous medical records or to obtain history from someone other than the patient: yes  Review and summarize past medical records: yes      This is a 71-year-old male who is exceptionally anxious about his health.  He had a family doctor's appointment scheduled this afternoon that he decided to skip and come to the emergency department instead.  He has been dealing with several weeks of generalized malaise with other symptoms such as nausea body aches abdominal cramping alternating constipation and diarrhea and decreased appetite and energy level.  The patient is shared with me his medical journal in which he has made daily entries for the past several weeks cataloging his symptoms and thoughts not all of which are medical.  From my standpoint on exam he appears to be in stable condition perhaps slightly dehydrated and does have some lower abdominal discomfort.  Will obtain labs and CT scan and provide supportive care treatments.  We will also test him for COVID as this was mentioned on multiple occasions in his journal.  Final diagnoses:   Malaise and fatigue   Gallstones   Lower abdominal pain   Anxiety about health       ED Disposition  ED Disposition     ED Disposition Condition Comment    Discharge Stable           Samy Wing MD  [address] [facility] [address] [phone]    Call in 1 day  For appointment to reevaluate your symptoms    [facility] EMERGENCY ROOM  [address] [facility] [address] [phone]    As needed, If symptoms worsen at any time         Medication List      New Prescriptions    ondansetron ODT 4 MG disintegrating  tablet  Commonly known as: ZOFRAN-ODT  Place 1 tablet on the tongue Every 8 (Eight) Hours As Needed for Nausea or Vomiting.           Where to Get Your Medications      You can get these medications from any pharmacy    Bring a paper prescription for each of these medications  · ondansetron ODT 4 MG disintegrating tablet          Black Huitron DO  01/11/22 0114

## 2022-01-11 ENCOUNTER — APPOINTMENT (OUTPATIENT)
Dept: PREADMISSION TESTING | Facility: HOSPITAL | Age: 72
End: 2022-01-11

## 2022-01-12 ENCOUNTER — OFFICE VISIT (OUTPATIENT)
Dept: SURGERY | Facility: CLINIC | Age: 72
End: 2022-01-12

## 2022-01-12 ENCOUNTER — PREP FOR SURGERY (OUTPATIENT)
Dept: OTHER | Facility: HOSPITAL | Age: 72
End: 2022-01-12

## 2022-01-12 VITALS — WEIGHT: 221.4 LBS | RESPIRATION RATE: 14 BRPM | BODY MASS INDEX: 34.75 KG/M2 | HEIGHT: 67 IN

## 2022-01-12 DIAGNOSIS — K81.9 CHOLECYSTITIS: Primary | ICD-10-CM

## 2022-01-12 DIAGNOSIS — K80.12 CALCULUS OF GALLBLADDER WITH ACUTE ON CHRONIC CHOLECYSTITIS WITHOUT OBSTRUCTION: Primary | ICD-10-CM

## 2022-01-12 PROCEDURE — 99214 OFFICE O/P EST MOD 30 MIN: CPT | Performed by: SURGERY

## 2022-01-12 RX ORDER — SODIUM CHLORIDE 0.9 % (FLUSH) 0.9 %
10 SYRINGE (ML) INJECTION EVERY 12 HOURS SCHEDULED
Status: CANCELLED | OUTPATIENT
Start: 2022-01-12

## 2022-01-12 RX ORDER — SODIUM CHLORIDE, SODIUM LACTATE, POTASSIUM CHLORIDE, CALCIUM CHLORIDE 600; 310; 30; 20 MG/100ML; MG/100ML; MG/100ML; MG/100ML
50 INJECTION, SOLUTION INTRAVENOUS CONTINUOUS
Status: CANCELLED | OUTPATIENT
Start: 2022-01-12

## 2022-01-12 RX ORDER — SODIUM CHLORIDE 0.9 % (FLUSH) 0.9 %
10 SYRINGE (ML) INJECTION AS NEEDED
Status: CANCELLED | OUTPATIENT
Start: 2022-01-12

## 2022-01-12 RX ORDER — INDOCYANINE GREEN AND WATER 25 MG
2.5 KIT INJECTION ONCE
Status: CANCELLED | OUTPATIENT
Start: 2022-01-12 | End: 2022-01-12

## 2022-01-12 RX ORDER — CLINDAMYCIN PHOSPHATE 900 MG/50ML
900 INJECTION INTRAVENOUS ONCE
Status: CANCELLED | OUTPATIENT
Start: 2022-01-12 | End: 2022-01-12

## 2022-01-12 RX ORDER — DOCUSATE SODIUM 100 MG/1
100 CAPSULE, LIQUID FILLED ORAL AS NEEDED
COMMUNITY
Start: 2021-12-28 | End: 2022-02-23

## 2022-01-12 RX ORDER — DILTIAZEM HYDROCHLORIDE 180 MG/1
CAPSULE, COATED, EXTENDED RELEASE ORAL
COMMUNITY
Start: 2022-01-11 | End: 2022-01-13

## 2022-01-12 NOTE — PROGRESS NOTES
General Surgery/Colorectal Surgery Note    Patient Name:  Christopher Finley  YOB: 1950  [id number]    Referring Provider: Samy Wing MD      Patient Care Team:  Samy Wing MD as PCP - General (Internal Medicine)  Yordan Louis MD as Consulting Physician (General Surgery)  Abdulkadir Sol MD as Consulting Physician (General Surgery)    Chief complaint abdominal pain    Subjective .     History of present illness:    History of sharp epigastric abdominal pain with associated nausea beginning in December 2021 with decreased appetite and 15 pound weight loss since onset.  No history of the same.  No vomiting.  No fever.  Pain worse recently.  Went to the emergency department this past Monday.  No relationship to food.  No history of ulcer disease.  Aspirin use daily.  No tobacco use.  No previous abdominal surgery.  No blood in the stool.  Colonoscopy 2019.  No chest pain.  No family history of esophageal gastric, colorectal cancer    Work-up in the emergency department 1/10/2022 with CT abdomen and pelvis with cholelithiasis and questionable pericholecystic inflammatory change.  Ultrasound right upper quadrant with gallbladder sludge, common duct 4 mm.  Labs 1/10/2022 with normal LFTs, normal lipase, CBC with no anemia    History:  Past Medical History:   Diagnosis Date   • Acid reflux    • Anemia    • Arthritis    • Bladder disorder    • Blood disease    • Chronic post-traumatic stress disorder (PTSD)    • Depression    • Diabetes (HCC)    • Diabetes mellitus (HCC)    • GERD (gastroesophageal reflux disease)    • High blood pressure    • High cholesterol    • Hyperlipemia    • Hyperlipidemia    • Hypertension    • Left elbow pain 06/18/2018   • Left radial head fracture 05/17/2018   • Left shoulder pain 05/17/2018   • Left wrist pain 06/22/2018   • Mood disorder (HCC)    • Rectal bleeding        Past Surgical History:   Procedure Laterality Date   • COLONOSCOPY     •  HEMORRHOIDECTOMY      BANDED, 2004,2006, ,    • INGUINAL HERNIA REPAIR     • TENDON REPAIR      LEFT HAND       Family History   Problem Relation Age of Onset   • Heart disease Mother    • Cancer Mother    • Kidney cancer Mother    • Stroke Father    • Kidney cancer Father    • Arthritis Father        Social History     Tobacco Use   • Smoking status: Former Smoker     Quit date: 1970     Years since quittin.5   • Smokeless tobacco: Never Used   Vaping Use   • Vaping Use: Never used   Substance Use Topics   • Alcohol use: Never   • Drug use: Never       Review of Systems  All systems were reviewed and negative except for:   Review of Systems   Constitutional: Negative for chills, fever and unexpected weight loss.   HENT: Negative for congestion, nosebleeds and voice change.    Eyes: Negative for blurred vision, double vision and discharge.   Respiratory: Negative for apnea, chest tightness and shortness of breath.    Cardiovascular: Negative for chest pain and leg swelling.   Gastrointestinal:        See HPI   Endocrine: Negative for cold intolerance and heat intolerance.   Genitourinary: Negative for dysuria, hematuria and urgency.   Musculoskeletal: Negative for back pain, joint swelling and neck pain.   Skin: Negative for color change and dry skin.   Neurological: Negative for dizziness and confusion.   Hematological: Negative for adenopathy.   Psychiatric/Behavioral: Negative for agitation and behavioral problems.     MEDS:  Prior to Admission medications    Medication Sig Start Date End Date Taking? Authorizing Provider   Aspirin Low Dose 81 MG EC tablet  21  Yes Emergency, Nurse LEAH Coello   buPROPion XL (WELLBUTRIN XL) 300 MG 24 hr tablet bupropion HCl 300 mg oral tablet extended release 24 hr take 1 tablet (300 mg) by oral route once daily   Active   Yes Emergency, Nurse Oumou RN   carBAMazepine (TEGretol) 200 MG tablet Take 200 mg by mouth Daily.   Yes Provider, MD Franchesca    cyproheptadine (PERIACTIN) 4 MG tablet Take 4 mg by mouth 2 (two) times a day.   Yes Provider, Historical, MD   dilTIAZem (Tiazac) 180 MG 24 hr capsule Tiazac 180 mg oral capsule,extended release 24 hr take 1 capsule (180 mg) by oral route once daily   Active   Yes Emergency, Nurse Oumou RN   docusate sodium (COLACE) 100 MG capsule  12/28/21  Yes Provider, Historical, MD   escitalopram (Lexapro) 20 MG tablet Lexapro 20 mg oral tablet take 1 tablet by oral route 2 times a day   Active   Yes Emergency, Nurse Oumou RN   Hydrocortisone, Perianal, (ANUSOL-HC) 2.5 % rectal cream  10/19/21  Yes Provider, MD Franchesca   lisinopril (PRINIVIL,ZESTRIL) 10 MG tablet  6/18/21  Yes Emergency, Nurse Oumou RN   metFORMIN (GLUCOPHAGE) 500 MG tablet  6/18/21  Yes Emergency, Nurse Oumou RN   ondansetron ODT (ZOFRAN-ODT) 4 MG disintegrating tablet Place 1 tablet on the tongue Every 8 (Eight) Hours As Needed for Nausea or Vomiting. 1/10/22  Yes Black Huitron DO   pantoprazole (PROTONIX) 40 MG EC tablet  6/18/21  Yes Emergency, Nurse Oumou RN   rosuvastatin (CRESTOR) 40 MG tablet  6/18/21  Yes Emergency, Nurse LEAH Coello   tetrahydrozoline-zinc (VISINE-AC) 0.05-0.25 % ophthalmic solution 1 drop.   Yes Emergency, Nurse LEAH Coello   Vitamin D 125 MCG (5000 UT) capsule capsule  6/18/21  Yes Emergency, Nurse Oumou RN   dilTIAZem CD (CARDIZEM CD) 180 MG 24 hr capsule  1/11/22   Provider, MD Franchesca   naproxen (EC NAPROSYN) 500 MG EC tablet Take 1 tablet by mouth 2 (Two) Times a Day With Meals. 12/16/21   Black Huitron DO   naproxen (NAPROSYN) 500 MG tablet  12/16/21   Provider, MD Franchesca   sulfamethoxazole-trimethoprim (Bactrim DS) 800-160 MG per tablet Every 12 (Twelve) Hours. 12/13/21   Emergency, Nurse Epic, RN        Allergies:  Amoxicillin    Objective     Vital Signs   Resp:  [14] 14    Physical Exam:     General Appearance:    Alert, cooperative, in no acute distress   Head:    Normocephalic, without obvious abnormality,  "atraumatic   Eyes:          Conjunctivae and sclerae normal, no icterus,     Ears:    Ears appear intact with no abnormalities noted   Throat:   No oral lesions, no thrush, oral mucosa moist   Neck:   No adenopathy, supple, trachea midline, no thyromegaly   Back:     No kyphosis present, no scoliosis present, no skin lesions,      erythema or scars, no tenderness to percussion or                   palpation,   range of motion normal   Lungs:     Clear to auscultation,respirations regular, even and                  unlabored    Heart:    Regular rhythm and normal rate, normal S1 and S2, no            murmur, no gallop, no rub, no click   Chest Wall:    No abnormalities observed   Abdomen:     Normal bowel sounds, no masses, no organomegaly, soft        moderate tenderness to palpation epigastric and right upper quadrant, non-distended, no guarding, no rebound                tenderness   Rectal:        Extremities:   Moves all extremities well, no edema, no cyanosis, no             redness   Pulses:   Pulses palpable and equal bilaterally   Skin:   No bleeding, bruising or rash   Lymph nodes:   No palpable adenopathy   Neurologic:   A/o x 4 with no deficits       Results Review:   {Results Review:98954::\"I reviewed the patient's new clinical results.\"    LABS/IMAGING:  Results for orders placed or performed during the hospital encounter of 01/10/22   COVID-19,CEPHEID/ASTRID,COR/SHELBIE/PAD/JONO IN-HOUSE(OR EMERGENT/ADD-ON),NP SWAB IN TRANSPORT MEDIA 3-4 HR TAT, RT-PCR - Swab, Nasopharynx    Specimen: Nasopharynx; Swab   Result Value Ref Range    COVID19 Not Detected Not Detected - Ref. Range   Comprehensive Metabolic Panel    Specimen: Blood   Result Value Ref Range    Glucose 122 (H) 65 - 99 mg/dL    BUN 16 8 - 23 mg/dL    Creatinine 0.98 0.76 - 1.27 mg/dL    Sodium 144 136 - 145 mmol/L    Potassium 4.6 3.5 - 5.2 mmol/L    Chloride 107 98 - 107 mmol/L    CO2 26.0 22.0 - 29.0 mmol/L    Calcium 10.2 8.6 - 10.5 mg/dL    Total " Protein 7.1 6.0 - 8.5 g/dL    Albumin 4.50 3.50 - 5.20 g/dL    ALT (SGPT) 20 1 - 41 U/L    AST (SGOT) 17 1 - 40 U/L    Alkaline Phosphatase 87 39 - 117 U/L    Total Bilirubin 0.3 0.0 - 1.2 mg/dL    eGFR Non African Amer 75 >60 mL/min/1.73    Globulin 2.6 gm/dL    A/G Ratio 1.7 g/dL    BUN/Creatinine Ratio 16.3 7.0 - 25.0    Anion Gap 11.0 5.0 - 15.0 mmol/L   Lipase    Specimen: Blood   Result Value Ref Range    Lipase 28 13 - 60 U/L   Urinalysis With Microscopic If Indicated (No Culture) - Urine, Clean Catch    Specimen: Urine, Clean Catch   Result Value Ref Range    Color, UA Yellow Yellow, Straw    Appearance, UA Clear Clear    pH, UA 7.0 5.0 - 8.0    Specific Gravity, UA 1.020 1.005 - 1.030    Glucose, UA Negative Negative    Ketones, UA Trace (A) Negative    Bilirubin, UA Negative Negative    Blood, UA Negative Negative    Protein, UA Negative Negative    Leuk Esterase, UA Negative Negative    Nitrite, UA Negative Negative    Urobilinogen, UA 0.2 E.U./dL 0.2 - 1.0 E.U./dL   CBC Auto Differential    Specimen: Blood   Result Value Ref Range    WBC 8.43 3.40 - 10.80 10*3/mm3    RBC 4.60 4.14 - 5.80 10*6/mm3    Hemoglobin 14.3 13.0 - 17.7 g/dL    Hematocrit 40.9 37.5 - 51.0 %    MCV 88.9 79.0 - 97.0 fL    MCH 31.1 26.6 - 33.0 pg    MCHC 35.0 31.5 - 35.7 g/dL    RDW 13.1 12.3 - 15.4 %    RDW-SD 42.5 37.0 - 54.0 fl    MPV 9.5 6.0 - 12.0 fL    Platelets 175 140 - 450 10*3/mm3    Neutrophil % 67.1 42.7 - 76.0 %    Lymphocyte % 23.5 19.6 - 45.3 %    Monocyte % 8.3 5.0 - 12.0 %    Eosinophil % 0.8 0.3 - 6.2 %    Basophil % 0.2 0.0 - 1.5 %    Immature Grans % 0.1 0.0 - 0.5 %    Neutrophils, Absolute 5.65 1.70 - 7.00 10*3/mm3    Lymphocytes, Absolute 1.98 0.70 - 3.10 10*3/mm3    Monocytes, Absolute 0.70 0.10 - 0.90 10*3/mm3    Eosinophils, Absolute 0.07 0.00 - 0.40 10*3/mm3    Basophils, Absolute 0.02 0.00 - 0.20 10*3/mm3    Immature Grans, Absolute 0.01 0.00 - 0.05 10*3/mm3    nRBC 0.0 0.0 - 0.2 /100 WBC   Carbamazepine  Level, Total    Specimen: Blood   Result Value Ref Range    Carbamazepine Level <2.0 (L) 4.0 - 12.0 mcg/mL   Green Top (Gel)   Result Value Ref Range    Extra Tube Hold for add-ons.    Lavender Top   Result Value Ref Range    Extra Tube hold for add-on    Gold Top - SST   Result Value Ref Range    Extra Tube Hold for add-ons.    Light Blue Top   Result Value Ref Range    Extra Tube hold for add-on         Result Review :     Assessment/Plan     Cholecystitis without evidence of obstruction or gangrene    I have reviewed the patient's work-up with the results mentioned above.  I recommended laparoscopic possible open cholecystectomy.  I explained the procedure and recovery.  Benefits and alternatives discussed.  Risk procedure including risk of anesthesia, bleeding, infection, conversion open, damage to the common bile duct, bile leak, hernia, heart attack, stroke, blood clot, pneumonia, failure to relieve his abdominal pain.  All questions answered.  He agrees with the plan.  Orders placed.  He was instructed to use Hibiclens on his abdomen the night before.  Thank you for the consult.              This document has been electronically signed by Abdulkadir Sol MD  January 12, 2022 13:54 EST

## 2022-01-12 NOTE — H&P (VIEW-ONLY)
General Surgery/Colorectal Surgery Note    Patient Name:  Christopher Finley  YOB: 1950  [id number]    Referring Provider: Samy Wing MD      Patient Care Team:  Samy Wing MD as PCP - General (Internal Medicine)  Yordan Louis MD as Consulting Physician (General Surgery)  Abdulkadir Sol MD as Consulting Physician (General Surgery)    Chief complaint abdominal pain    Subjective .     History of present illness:    History of sharp epigastric abdominal pain with associated nausea beginning in December 2021 with decreased appetite and 15 pound weight loss since onset.  No history of the same.  No vomiting.  No fever.  Pain worse recently.  Went to the emergency department this past Monday.  No relationship to food.  No history of ulcer disease.  Aspirin use daily.  No tobacco use.  No previous abdominal surgery.  No blood in the stool.  Colonoscopy 2019.  No chest pain.  No family history of esophageal gastric, colorectal cancer    Work-up in the emergency department 1/10/2022 with CT abdomen and pelvis with cholelithiasis and questionable pericholecystic inflammatory change.  Ultrasound right upper quadrant with gallbladder sludge, common duct 4 mm.  Labs 1/10/2022 with normal LFTs, normal lipase, CBC with no anemia    History:  Past Medical History:   Diagnosis Date   • Acid reflux    • Anemia    • Arthritis    • Bladder disorder    • Blood disease    • Chronic post-traumatic stress disorder (PTSD)    • Depression    • Diabetes (HCC)    • Diabetes mellitus (HCC)    • GERD (gastroesophageal reflux disease)    • High blood pressure    • High cholesterol    • Hyperlipemia    • Hyperlipidemia    • Hypertension    • Left elbow pain 06/18/2018   • Left radial head fracture 05/17/2018   • Left shoulder pain 05/17/2018   • Left wrist pain 06/22/2018   • Mood disorder (HCC)    • Rectal bleeding        Past Surgical History:   Procedure Laterality Date   • COLONOSCOPY     •  HEMORRHOIDECTOMY      BANDED, 2004,2006, ,    • INGUINAL HERNIA REPAIR     • TENDON REPAIR      LEFT HAND       Family History   Problem Relation Age of Onset   • Heart disease Mother    • Cancer Mother    • Kidney cancer Mother    • Stroke Father    • Kidney cancer Father    • Arthritis Father        Social History     Tobacco Use   • Smoking status: Former Smoker     Quit date: 1970     Years since quittin.5   • Smokeless tobacco: Never Used   Vaping Use   • Vaping Use: Never used   Substance Use Topics   • Alcohol use: Never   • Drug use: Never       Review of Systems  All systems were reviewed and negative except for:   Review of Systems   Constitutional: Negative for chills, fever and unexpected weight loss.   HENT: Negative for congestion, nosebleeds and voice change.    Eyes: Negative for blurred vision, double vision and discharge.   Respiratory: Negative for apnea, chest tightness and shortness of breath.    Cardiovascular: Negative for chest pain and leg swelling.   Gastrointestinal:        See HPI   Endocrine: Negative for cold intolerance and heat intolerance.   Genitourinary: Negative for dysuria, hematuria and urgency.   Musculoskeletal: Negative for back pain, joint swelling and neck pain.   Skin: Negative for color change and dry skin.   Neurological: Negative for dizziness and confusion.   Hematological: Negative for adenopathy.   Psychiatric/Behavioral: Negative for agitation and behavioral problems.     MEDS:  Prior to Admission medications    Medication Sig Start Date End Date Taking? Authorizing Provider   Aspirin Low Dose 81 MG EC tablet  21  Yes Emergency, Nurse LEAH Coello   buPROPion XL (WELLBUTRIN XL) 300 MG 24 hr tablet bupropion HCl 300 mg oral tablet extended release 24 hr take 1 tablet (300 mg) by oral route once daily   Active   Yes Emergency, Nurse Oumou RN   carBAMazepine (TEGretol) 200 MG tablet Take 200 mg by mouth Daily.   Yes Provider, MD Franchesca    cyproheptadine (PERIACTIN) 4 MG tablet Take 4 mg by mouth 2 (two) times a day.   Yes Provider, Historical, MD   dilTIAZem (Tiazac) 180 MG 24 hr capsule Tiazac 180 mg oral capsule,extended release 24 hr take 1 capsule (180 mg) by oral route once daily   Active   Yes Emergency, Nurse Oumou RN   docusate sodium (COLACE) 100 MG capsule  12/28/21  Yes Provider, Historical, MD   escitalopram (Lexapro) 20 MG tablet Lexapro 20 mg oral tablet take 1 tablet by oral route 2 times a day   Active   Yes Emergency, Nurse uOmou RN   Hydrocortisone, Perianal, (ANUSOL-HC) 2.5 % rectal cream  10/19/21  Yes Provider, MD Franchesca   lisinopril (PRINIVIL,ZESTRIL) 10 MG tablet  6/18/21  Yes Emergency, Nurse Oumou RN   metFORMIN (GLUCOPHAGE) 500 MG tablet  6/18/21  Yes Emergency, Nurse Oumou RN   ondansetron ODT (ZOFRAN-ODT) 4 MG disintegrating tablet Place 1 tablet on the tongue Every 8 (Eight) Hours As Needed for Nausea or Vomiting. 1/10/22  Yes Black Huitron DO   pantoprazole (PROTONIX) 40 MG EC tablet  6/18/21  Yes Emergency, Nurse Oumou RN   rosuvastatin (CRESTOR) 40 MG tablet  6/18/21  Yes Emergency, Nurse LEAH Coello   tetrahydrozoline-zinc (VISINE-AC) 0.05-0.25 % ophthalmic solution 1 drop.   Yes Emergency, Nurse LEAH Coello   Vitamin D 125 MCG (5000 UT) capsule capsule  6/18/21  Yes Emergency, Nurse Oumou RN   dilTIAZem CD (CARDIZEM CD) 180 MG 24 hr capsule  1/11/22   Provider, MD Franchesca   naproxen (EC NAPROSYN) 500 MG EC tablet Take 1 tablet by mouth 2 (Two) Times a Day With Meals. 12/16/21   Black Huitron DO   naproxen (NAPROSYN) 500 MG tablet  12/16/21   Provider, MD Franchesca   sulfamethoxazole-trimethoprim (Bactrim DS) 800-160 MG per tablet Every 12 (Twelve) Hours. 12/13/21   Emergency, Nurse Epic, RN        Allergies:  Amoxicillin    Objective     Vital Signs   Resp:  [14] 14    Physical Exam:     General Appearance:    Alert, cooperative, in no acute distress   Head:    Normocephalic, without obvious abnormality,  "atraumatic   Eyes:          Conjunctivae and sclerae normal, no icterus,     Ears:    Ears appear intact with no abnormalities noted   Throat:   No oral lesions, no thrush, oral mucosa moist   Neck:   No adenopathy, supple, trachea midline, no thyromegaly   Back:     No kyphosis present, no scoliosis present, no skin lesions,      erythema or scars, no tenderness to percussion or                   palpation,   range of motion normal   Lungs:     Clear to auscultation,respirations regular, even and                  unlabored    Heart:    Regular rhythm and normal rate, normal S1 and S2, no            murmur, no gallop, no rub, no click   Chest Wall:    No abnormalities observed   Abdomen:     Normal bowel sounds, no masses, no organomegaly, soft        moderate tenderness to palpation epigastric and right upper quadrant, non-distended, no guarding, no rebound                tenderness   Rectal:        Extremities:   Moves all extremities well, no edema, no cyanosis, no             redness   Pulses:   Pulses palpable and equal bilaterally   Skin:   No bleeding, bruising or rash   Lymph nodes:   No palpable adenopathy   Neurologic:   A/o x 4 with no deficits       Results Review:   {Results Review:53116::\"I reviewed the patient's new clinical results.\"    LABS/IMAGING:  Results for orders placed or performed during the hospital encounter of 01/10/22   COVID-19,CEPHEID/ASTRID,COR/SHELBIE/PAD/JONO IN-HOUSE(OR EMERGENT/ADD-ON),NP SWAB IN TRANSPORT MEDIA 3-4 HR TAT, RT-PCR - Swab, Nasopharynx    Specimen: Nasopharynx; Swab   Result Value Ref Range    COVID19 Not Detected Not Detected - Ref. Range   Comprehensive Metabolic Panel    Specimen: Blood   Result Value Ref Range    Glucose 122 (H) 65 - 99 mg/dL    BUN 16 8 - 23 mg/dL    Creatinine 0.98 0.76 - 1.27 mg/dL    Sodium 144 136 - 145 mmol/L    Potassium 4.6 3.5 - 5.2 mmol/L    Chloride 107 98 - 107 mmol/L    CO2 26.0 22.0 - 29.0 mmol/L    Calcium 10.2 8.6 - 10.5 mg/dL    Total " Protein 7.1 6.0 - 8.5 g/dL    Albumin 4.50 3.50 - 5.20 g/dL    ALT (SGPT) 20 1 - 41 U/L    AST (SGOT) 17 1 - 40 U/L    Alkaline Phosphatase 87 39 - 117 U/L    Total Bilirubin 0.3 0.0 - 1.2 mg/dL    eGFR Non African Amer 75 >60 mL/min/1.73    Globulin 2.6 gm/dL    A/G Ratio 1.7 g/dL    BUN/Creatinine Ratio 16.3 7.0 - 25.0    Anion Gap 11.0 5.0 - 15.0 mmol/L   Lipase    Specimen: Blood   Result Value Ref Range    Lipase 28 13 - 60 U/L   Urinalysis With Microscopic If Indicated (No Culture) - Urine, Clean Catch    Specimen: Urine, Clean Catch   Result Value Ref Range    Color, UA Yellow Yellow, Straw    Appearance, UA Clear Clear    pH, UA 7.0 5.0 - 8.0    Specific Gravity, UA 1.020 1.005 - 1.030    Glucose, UA Negative Negative    Ketones, UA Trace (A) Negative    Bilirubin, UA Negative Negative    Blood, UA Negative Negative    Protein, UA Negative Negative    Leuk Esterase, UA Negative Negative    Nitrite, UA Negative Negative    Urobilinogen, UA 0.2 E.U./dL 0.2 - 1.0 E.U./dL   CBC Auto Differential    Specimen: Blood   Result Value Ref Range    WBC 8.43 3.40 - 10.80 10*3/mm3    RBC 4.60 4.14 - 5.80 10*6/mm3    Hemoglobin 14.3 13.0 - 17.7 g/dL    Hematocrit 40.9 37.5 - 51.0 %    MCV 88.9 79.0 - 97.0 fL    MCH 31.1 26.6 - 33.0 pg    MCHC 35.0 31.5 - 35.7 g/dL    RDW 13.1 12.3 - 15.4 %    RDW-SD 42.5 37.0 - 54.0 fl    MPV 9.5 6.0 - 12.0 fL    Platelets 175 140 - 450 10*3/mm3    Neutrophil % 67.1 42.7 - 76.0 %    Lymphocyte % 23.5 19.6 - 45.3 %    Monocyte % 8.3 5.0 - 12.0 %    Eosinophil % 0.8 0.3 - 6.2 %    Basophil % 0.2 0.0 - 1.5 %    Immature Grans % 0.1 0.0 - 0.5 %    Neutrophils, Absolute 5.65 1.70 - 7.00 10*3/mm3    Lymphocytes, Absolute 1.98 0.70 - 3.10 10*3/mm3    Monocytes, Absolute 0.70 0.10 - 0.90 10*3/mm3    Eosinophils, Absolute 0.07 0.00 - 0.40 10*3/mm3    Basophils, Absolute 0.02 0.00 - 0.20 10*3/mm3    Immature Grans, Absolute 0.01 0.00 - 0.05 10*3/mm3    nRBC 0.0 0.0 - 0.2 /100 WBC   Carbamazepine  Level, Total    Specimen: Blood   Result Value Ref Range    Carbamazepine Level <2.0 (L) 4.0 - 12.0 mcg/mL   Green Top (Gel)   Result Value Ref Range    Extra Tube Hold for add-ons.    Lavender Top   Result Value Ref Range    Extra Tube hold for add-on    Gold Top - SST   Result Value Ref Range    Extra Tube Hold for add-ons.    Light Blue Top   Result Value Ref Range    Extra Tube hold for add-on         Result Review :     Assessment/Plan     Cholecystitis without evidence of obstruction or gangrene    I have reviewed the patient's work-up with the results mentioned above.  I recommended laparoscopic possible open cholecystectomy.  I explained the procedure and recovery.  Benefits and alternatives discussed.  Risk procedure including risk of anesthesia, bleeding, infection, conversion open, damage to the common bile duct, bile leak, hernia, heart attack, stroke, blood clot, pneumonia, failure to relieve his abdominal pain.  All questions answered.  He agrees with the plan.  Orders placed.  He was instructed to use Hibiclens on his abdomen the night before.  Thank you for the consult.              This document has been electronically signed by Abdulkadir Sol MD  January 12, 2022 13:54 EST

## 2022-01-13 RX ORDER — CARBOXYMETHYLCELLULOSE SODIUM 5 MG/ML
1 SOLUTION/ DROPS OPHTHALMIC AS NEEDED
COMMUNITY

## 2022-01-13 NOTE — PRE-PROCEDURE INSTRUCTIONS
Patient instructed to have no food past midnight, clears up to 2 hours prior to arrival time. Patient instructed to wear no lotions, jewelry or piercing's day of surgery. Patient to wash with surgical soap am of surgery.  Patient to take Bupropion and pantoprazole am of surgery.

## 2022-01-14 ENCOUNTER — ANESTHESIA (OUTPATIENT)
Dept: PERIOP | Facility: HOSPITAL | Age: 72
End: 2022-01-14

## 2022-01-14 ENCOUNTER — ANESTHESIA EVENT (OUTPATIENT)
Dept: PERIOP | Facility: HOSPITAL | Age: 72
End: 2022-01-14

## 2022-01-14 ENCOUNTER — HOSPITAL ENCOUNTER (OUTPATIENT)
Facility: HOSPITAL | Age: 72
Setting detail: HOSPITAL OUTPATIENT SURGERY
Discharge: HOME | End: 2022-01-14
Attending: SURGERY | Admitting: SURGERY

## 2022-01-14 VITALS
TEMPERATURE: 97.3 F | HEIGHT: 67 IN | OXYGEN SATURATION: 97 % | SYSTOLIC BLOOD PRESSURE: 143 MMHG | BODY MASS INDEX: 33.94 KG/M2 | HEART RATE: 50 BPM | RESPIRATION RATE: 16 BRPM | DIASTOLIC BLOOD PRESSURE: 69 MMHG | WEIGHT: 216.27 LBS

## 2022-01-14 DIAGNOSIS — K80.12 CALCULUS OF GALLBLADDER WITH ACUTE ON CHRONIC CHOLECYSTITIS WITHOUT OBSTRUCTION: ICD-10-CM

## 2022-01-14 LAB
GLUCOSE BLDC GLUCOMTR-MCNC: 149 MG/DL (ref 70–99)
QT INTERVAL: 440 MS

## 2022-01-14 PROCEDURE — 25010000002 PROPOFOL 10 MG/ML EMULSION: Performed by: NURSE ANESTHETIST, CERTIFIED REGISTERED

## 2022-01-14 PROCEDURE — C1889 IMPLANT/INSERT DEVICE, NOC: HCPCS | Performed by: SURGERY

## 2022-01-14 PROCEDURE — 25010000002 DEXAMETHASONE PER 1 MG: Performed by: NURSE ANESTHETIST, CERTIFIED REGISTERED

## 2022-01-14 PROCEDURE — 25010000002 FENTANYL CITRATE (PF) 50 MCG/ML SOLUTION: Performed by: NURSE ANESTHETIST, CERTIFIED REGISTERED

## 2022-01-14 PROCEDURE — 47563 LAPARO CHOLECYSTECTOMY/GRAPH: CPT | Performed by: SURGERY

## 2022-01-14 PROCEDURE — 25010000002 ONDANSETRON PER 1 MG: Performed by: NURSE ANESTHETIST, CERTIFIED REGISTERED

## 2022-01-14 PROCEDURE — 82962 GLUCOSE BLOOD TEST: CPT

## 2022-01-14 PROCEDURE — 93010 ELECTROCARDIOGRAM REPORT: CPT | Performed by: INTERNAL MEDICINE

## 2022-01-14 PROCEDURE — 25010000002 MIDAZOLAM PER 1 MG: Performed by: ANESTHESIOLOGY

## 2022-01-14 PROCEDURE — 63710000001 PROMETHAZINE PER 25 MG: Performed by: NURSE ANESTHETIST, CERTIFIED REGISTERED

## 2022-01-14 PROCEDURE — C9776: HCPCS

## 2022-01-14 PROCEDURE — 25010000002 BUPRENORPHINE PER 0.1 MG: Performed by: SURGERY

## 2022-01-14 PROCEDURE — 93005 ELECTROCARDIOGRAM TRACING: CPT | Performed by: ANESTHESIOLOGY

## 2022-01-14 PROCEDURE — 25010000002 DEXAMETHASONE PER 1 MG: Performed by: SURGERY

## 2022-01-14 PROCEDURE — 88304 TISSUE EXAM BY PATHOLOGIST: CPT | Performed by: SURGERY

## 2022-01-14 DEVICE — LIGAMAX 5 MM ENDOSCOPIC MULTIPLE CLIP APPLIER
Type: IMPLANTABLE DEVICE | Site: ABDOMEN | Status: FUNCTIONAL
Brand: LIGAMAX

## 2022-01-14 RX ORDER — ACETAMINOPHEN 500 MG
1000 TABLET ORAL ONCE
Status: COMPLETED | OUTPATIENT
Start: 2022-01-14 | End: 2022-01-14

## 2022-01-14 RX ORDER — GLYCOPYRROLATE 0.2 MG/ML
INJECTION INTRAMUSCULAR; INTRAVENOUS AS NEEDED
Status: DISCONTINUED | OUTPATIENT
Start: 2022-01-14 | End: 2022-01-14 | Stop reason: SURG

## 2022-01-14 RX ORDER — SODIUM CHLORIDE, SODIUM LACTATE, POTASSIUM CHLORIDE, CALCIUM CHLORIDE 600; 310; 30; 20 MG/100ML; MG/100ML; MG/100ML; MG/100ML
9 INJECTION, SOLUTION INTRAVENOUS CONTINUOUS PRN
Status: DISCONTINUED | OUTPATIENT
Start: 2022-01-14 | End: 2022-01-14 | Stop reason: HOSPADM

## 2022-01-14 RX ORDER — POLYETHYLENE GLYCOL 3350 17 G/17G
17 POWDER, FOR SOLUTION ORAL DAILY
Qty: 5 PACKET | Refills: 0 | Status: SHIPPED | OUTPATIENT
Start: 2022-01-14 | End: 2022-02-02

## 2022-01-14 RX ORDER — INDOCYANINE GREEN AND WATER 25 MG
2.5 KIT INJECTION ONCE
Status: COMPLETED | OUTPATIENT
Start: 2022-01-14 | End: 2022-01-14

## 2022-01-14 RX ORDER — FENTANYL CITRATE 50 UG/ML
INJECTION, SOLUTION INTRAMUSCULAR; INTRAVENOUS AS NEEDED
Status: DISCONTINUED | OUTPATIENT
Start: 2022-01-14 | End: 2022-01-14 | Stop reason: SURG

## 2022-01-14 RX ORDER — ONDANSETRON 2 MG/ML
4 INJECTION INTRAMUSCULAR; INTRAVENOUS ONCE AS NEEDED
Status: DISCONTINUED | OUTPATIENT
Start: 2022-01-14 | End: 2022-01-14 | Stop reason: HOSPADM

## 2022-01-14 RX ORDER — PROPOFOL 10 MG/ML
VIAL (ML) INTRAVENOUS AS NEEDED
Status: DISCONTINUED | OUTPATIENT
Start: 2022-01-14 | End: 2022-01-14 | Stop reason: SURG

## 2022-01-14 RX ORDER — ROCURONIUM BROMIDE 10 MG/ML
INJECTION, SOLUTION INTRAVENOUS AS NEEDED
Status: DISCONTINUED | OUTPATIENT
Start: 2022-01-14 | End: 2022-01-14 | Stop reason: SURG

## 2022-01-14 RX ORDER — OXYCODONE HYDROCHLORIDE 5 MG/1
5 TABLET ORAL
Status: DISCONTINUED | OUTPATIENT
Start: 2022-01-14 | End: 2022-01-14 | Stop reason: HOSPADM

## 2022-01-14 RX ORDER — PROMETHAZINE HYDROCHLORIDE 25 MG/1
25 SUPPOSITORY RECTAL ONCE AS NEEDED
Status: COMPLETED | OUTPATIENT
Start: 2022-01-14 | End: 2022-01-14

## 2022-01-14 RX ORDER — HYDROCODONE BITARTRATE AND ACETAMINOPHEN 5; 325 MG/1; MG/1
1 TABLET ORAL EVERY 6 HOURS PRN
Qty: 12 TABLET | Refills: 0 | Status: SHIPPED | OUTPATIENT
Start: 2022-01-14 | End: 2022-02-02

## 2022-01-14 RX ORDER — SODIUM CHLORIDE 0.9 % (FLUSH) 0.9 %
10 SYRINGE (ML) INJECTION EVERY 12 HOURS SCHEDULED
Status: DISCONTINUED | OUTPATIENT
Start: 2022-01-14 | End: 2022-01-14 | Stop reason: HOSPADM

## 2022-01-14 RX ORDER — LIDOCAINE HYDROCHLORIDE 20 MG/ML
INJECTION, SOLUTION INFILTRATION; PERINEURAL AS NEEDED
Status: DISCONTINUED | OUTPATIENT
Start: 2022-01-14 | End: 2022-01-14 | Stop reason: SURG

## 2022-01-14 RX ORDER — SCOLOPAMINE TRANSDERMAL SYSTEM 1 MG/1
1 PATCH, EXTENDED RELEASE TRANSDERMAL ONCE
Status: DISCONTINUED | OUTPATIENT
Start: 2022-01-14 | End: 2022-01-14 | Stop reason: HOSPADM

## 2022-01-14 RX ORDER — ONDANSETRON 2 MG/ML
INJECTION INTRAMUSCULAR; INTRAVENOUS AS NEEDED
Status: DISCONTINUED | OUTPATIENT
Start: 2022-01-14 | End: 2022-01-14 | Stop reason: SURG

## 2022-01-14 RX ORDER — EPHEDRINE SULFATE 50 MG/ML
INJECTION, SOLUTION INTRAVENOUS AS NEEDED
Status: DISCONTINUED | OUTPATIENT
Start: 2022-01-14 | End: 2022-01-14 | Stop reason: SURG

## 2022-01-14 RX ORDER — DEXAMETHASONE SODIUM PHOSPHATE 4 MG/ML
INJECTION, SOLUTION INTRA-ARTICULAR; INTRALESIONAL; INTRAMUSCULAR; INTRAVENOUS; SOFT TISSUE AS NEEDED
Status: DISCONTINUED | OUTPATIENT
Start: 2022-01-14 | End: 2022-01-14 | Stop reason: SURG

## 2022-01-14 RX ORDER — PROMETHAZINE HYDROCHLORIDE 12.5 MG/1
25 TABLET ORAL ONCE AS NEEDED
Status: COMPLETED | OUTPATIENT
Start: 2022-01-14 | End: 2022-01-14

## 2022-01-14 RX ORDER — SODIUM CHLORIDE, SODIUM LACTATE, POTASSIUM CHLORIDE, CALCIUM CHLORIDE 600; 310; 30; 20 MG/100ML; MG/100ML; MG/100ML; MG/100ML
50 INJECTION, SOLUTION INTRAVENOUS CONTINUOUS
Status: DISCONTINUED | OUTPATIENT
Start: 2022-01-14 | End: 2022-01-14 | Stop reason: HOSPADM

## 2022-01-14 RX ORDER — MEPERIDINE HYDROCHLORIDE 25 MG/ML
12.5 INJECTION INTRAMUSCULAR; INTRAVENOUS; SUBCUTANEOUS
Status: DISCONTINUED | OUTPATIENT
Start: 2022-01-14 | End: 2022-01-14 | Stop reason: HOSPADM

## 2022-01-14 RX ORDER — SODIUM CHLORIDE 0.9 % (FLUSH) 0.9 %
10 SYRINGE (ML) INJECTION AS NEEDED
Status: DISCONTINUED | OUTPATIENT
Start: 2022-01-14 | End: 2022-01-14 | Stop reason: HOSPADM

## 2022-01-14 RX ORDER — MAGNESIUM HYDROXIDE 1200 MG/15ML
LIQUID ORAL AS NEEDED
Status: DISCONTINUED | OUTPATIENT
Start: 2022-01-14 | End: 2022-01-14 | Stop reason: HOSPADM

## 2022-01-14 RX ORDER — CLINDAMYCIN PHOSPHATE 900 MG/50ML
900 INJECTION INTRAVENOUS ONCE
Status: COMPLETED | OUTPATIENT
Start: 2022-01-14 | End: 2022-01-14

## 2022-01-14 RX ORDER — MIDAZOLAM HYDROCHLORIDE 1 MG/ML
2 INJECTION INTRAMUSCULAR; INTRAVENOUS ONCE
Status: COMPLETED | OUTPATIENT
Start: 2022-01-14 | End: 2022-01-14

## 2022-01-14 RX ADMIN — LIDOCAINE HYDROCHLORIDE 80 MG: 20 INJECTION, SOLUTION INFILTRATION; PERINEURAL at 12:05

## 2022-01-14 RX ADMIN — CLINDAMYCIN PHOSPHATE 900 MG: 900 INJECTION, SOLUTION INTRAVENOUS at 12:03

## 2022-01-14 RX ADMIN — SODIUM CHLORIDE, POTASSIUM CHLORIDE, SODIUM LACTATE AND CALCIUM CHLORIDE 9 ML/HR: 600; 310; 30; 20 INJECTION, SOLUTION INTRAVENOUS at 10:13

## 2022-01-14 RX ADMIN — ROCURONIUM BROMIDE 50 MG: 10 INJECTION INTRAVENOUS at 12:05

## 2022-01-14 RX ADMIN — ACETAMINOPHEN 1000 MG: 500 TABLET ORAL at 10:13

## 2022-01-14 RX ADMIN — PROPOFOL 150 MG: 10 INJECTION, EMULSION INTRAVENOUS at 12:05

## 2022-01-14 RX ADMIN — EPHEDRINE SULFATE 10 MG: 50 INJECTION INTRAVENOUS at 12:09

## 2022-01-14 RX ADMIN — INDOCYANINE GREEN AND WATER 25 MG: KIT at 10:14

## 2022-01-14 RX ADMIN — SCOPALAMINE 1 PATCH: 1 PATCH, EXTENDED RELEASE TRANSDERMAL at 15:16

## 2022-01-14 RX ADMIN — ROCURONIUM BROMIDE 20 MG: 10 INJECTION INTRAVENOUS at 12:45

## 2022-01-14 RX ADMIN — ONDANSETRON 4 MG: 2 INJECTION INTRAMUSCULAR; INTRAVENOUS at 13:43

## 2022-01-14 RX ADMIN — FENTANYL CITRATE 50 MCG: 50 INJECTION, SOLUTION INTRAMUSCULAR; INTRAVENOUS at 12:05

## 2022-01-14 RX ADMIN — EPHEDRINE SULFATE 10 MG: 50 INJECTION INTRAVENOUS at 12:20

## 2022-01-14 RX ADMIN — SUGAMMADEX 200 MG: 100 INJECTION, SOLUTION INTRAVENOUS at 13:13

## 2022-01-14 RX ADMIN — SODIUM CHLORIDE, POTASSIUM CHLORIDE, SODIUM LACTATE AND CALCIUM CHLORIDE: 600; 310; 30; 20 INJECTION, SOLUTION INTRAVENOUS at 12:35

## 2022-01-14 RX ADMIN — ONDANSETRON 4 MG: 2 INJECTION INTRAMUSCULAR; INTRAVENOUS at 12:34

## 2022-01-14 RX ADMIN — PROMETHAZINE HYDROCHLORIDE 12.5 MG: 25 TABLET ORAL at 14:21

## 2022-01-14 RX ADMIN — GLYCOPYRROLATE 0.2 MG: 0.2 INJECTION INTRAMUSCULAR; INTRAVENOUS at 12:20

## 2022-01-14 RX ADMIN — MIDAZOLAM HYDROCHLORIDE 2 MG: 1 INJECTION, SOLUTION INTRAMUSCULAR; INTRAVENOUS at 11:41

## 2022-01-14 RX ADMIN — DEXAMETHASONE SODIUM PHOSPHATE 4 MG: 4 INJECTION INTRA-ARTICULAR; INTRALESIONAL; INTRAMUSCULAR; INTRAVENOUS; SOFT TISSUE at 12:10

## 2022-01-14 NOTE — OP NOTE
OP NOTE  CHOLECYSTECTOMY LAPAROSCOPIC  Procedure Report    Patient Name:  Christopher Finley  YOB: 1950  [id number]    Date of Surgery:  1/14/2022     Indications: See last clinic note for indications, discussion of risk benefits and alternative    Pre-op Diagnosis:   Calculus of gallbladder with acute on chronic cholecystitis without obstruction [K80.12]       Post-Op Diagnosis Codes:     * Calculus of gallbladder with acute on chronic cholecystitis without obstruction [K80.12]    Procedure/CPT® Codes:      Procedure(s):  CHOLECYSTECTOMY LAPAROSCOPIC with ICG cholangiography    Staff:  Surgeon(s):  Abdulkadir Sol MD    Assistant: Sona De CSA    Anesthesia: General, Local    Estimated Blood Loss: 5 cc    Implants:    Implant Name Type Inv. Item Serial No.  Lot No. LRB No. Used Action   CLIPAPPLR M/ ENDO LIGAMAX5 5MM 33CM MD/KYLAH - [id number] Implant CLIPAPPLR M/ ENDO LIGAMAX5 5MM 33CM MD/KYLAH  [employer]  DIV OF J AND J [id number] N/A 1 Implanted       Specimen:          Specimens     ID Source Type Tests Collected By Collected At Frozen?    A Gallbladder Tissue · TISSUE PATHOLOGY EXAM   Abdulkadir Sol MD 1/14/22 1300 No    Description: gallbladder and contents            Findings: Critical view of safety obtained with aid of ICG cholangiography.  Patient cystic artery had an anterior posterior branch with 2 hemoclips placed proximal on each branch.    Complications: None    Description of Procedure: After all questions answered, consent verified.  Patient was brought the operating per stretcher placed in supine position arms out all extremities padded.  Bilateral lower extremity SCDs placed.  General tracheal anesthesia induced.  Preoperative IV antibiotics administered.  Patient's abdominal hair removed with clippers.  Patient's abdomen prepped with ChloraPrep.  We waited 3 minutes.  Draped in usual sterile fashion.  Critical timeout  taken.  Ioban applied.  Made an incision above the umbilicus.  I entered the abdomen sharply under direct vision without injury to the viscera below.  I placed a 12 mm trocar.  I obtain pneumoperitoneum with CO2 insufflation.  I placed the patient in reverse Trendelenburg and rotated to the left.  I placed a 5 trocar in the right lateral abdomen, right upper quadrant, subxiphoid.  The gallbladder was retracted cephalad and lateral.  Using ICG cholangiography I performed L-hook and blunt dissection of the hepatocystic triangle until obtained a critical view of safety with only 2 structures seen entering the gallbladder.  The patient had an anterior posterior branch which retraced and confirmed to be part of the cystic artery entering the gallbladder.  Anterior and posterior windows were free of fibrous fatty tissue, lower third of the gallbladder taken off the cystic plate.  Intraoperative timeout performed confirm anatomy.  I then placed 2 5 mm hemoclips proximal 1 distal across anterior branch of the cystic artery.  I divided between the 2 clips and 1 clip up.  This is repeated for the posterior branch.  This was repeated for the skeletonize cystic duct.  No spillage of bile or blood after admission.  The gallbladder was removed with L-hook electrocautery.  Was placed in a laparoscopic retrieval device.  I infiltrated bilateral upper quadrants and a tap block fashion with local anesthesia.  I irrigated the right upper quadrant.  I verified hemostasis.  I removed the trochars desufflated the abdomen remove the specimen bag.  I closed the umbilical fascia 0 Vicryl.  I closed the incisions with Vicryl Monocryl and skin glue.  At the end of the procedure all counts were correct.  I was present for the procedure.  I opened the gallbladder on the back table with only one duct noted exiting the gallbladder.  It was sent to pathology.  Multiple stones noted.  The surgical first assistant scrub and help with the key  portions of the case.    Abdulkadir Sol MD     Date: 1/14/2022  Time: 13:21 EST

## 2022-01-14 NOTE — ANESTHESIA POSTPROCEDURE EVALUATION
Patient: Christopher Finley    Procedure Summary     Date: 01/14/22 Room / Location: [facility]  /  [facility]    Anesthesia Start: 1203 Anesthesia Stop: 1328    Procedure: CHOLECYSTECTOMY LAPAROSCOPIC (N/A Abdomen) Diagnosis:       Calculus of gallbladder with acute on chronic cholecystitis without obstruction      (Calculus of gallbladder with acute on chronic cholecystitis without obstruction [K80.12])    Surgeons: Abdulkadir Sol MD Provider: Burt Lepe MD    Anesthesia Type: general ASA Status: 2          Anesthesia Type: general    Vitals  Vitals Value Taken Time   /63 01/14/22 1323   Temp 36.3 °C (97.4 °F) 01/14/22 1323   Pulse 63 01/14/22 1328   Resp 19 01/14/22 1323   SpO2 94 % 01/14/22 1328   Vitals shown include unvalidated device data.        Post Anesthesia Care and Evaluation    Patient location during evaluation: bedside  Patient participation: complete - patient participated  Level of consciousness: awake  Pain management: adequate  Airway patency: patent  Anesthetic complications: No anesthetic complications  PONV Status: none  Cardiovascular status: acceptable and stable  Respiratory status: acceptable  Hydration status: acceptable    Comments: An Anesthesiologist personally participated in the most demanding procedures (including induction and emergence if applicable) in the anesthesia plan, monitored the course of anesthesia administration at frequent intervals and remained physically present and available for immediate diagnosis and treatment of emergencies.

## 2022-01-14 NOTE — DISCHARGE INSTRUCTIONS
DISCHARGE INSTRUCTIONS LAPAROSCOPIC CHOLECYSTECTOMY/APPENDECTOMY  (GALL BLADDER)      ? For your surgery you had:  ? General anesthesia (you may have a sore throat for the first 24 hours)  ? IV sedation  ? Local anesthesia  ? Monitored anesthesia care  ? You received a medicated patch for nausea prevention today (behind your ear). It is recommended that you remove it 24-48 hours post-operatively. It must be removed within 72 hours.  ? You received an anesthesia medication today that can cause hormonal forms of birth control to be ineffective. You should use a different form of birth control (to prevent pregnancy) for 7 days.   ? You may experience dizziness, drowsiness, or light-headedness for several hours following surgery.  ? Do not stay alone tonight.  ? Limit your activity for 24 hours.  ? Resume your diet slowly.  Follow whatever special dietary instructions you may have been given by your doctor.  ? You should not drive, operate machinery, drink alcohol, or sign legally binding documents for 24 hours or while you are taking pain medication.  Last dose of pain medication was given at:   .    NOTIFY YOUR DOCTOR IF YOU EXPERIENCE ANY OF THE FOLLOWING:  ? Temperature greater than 101 degrees Fahrenheit  ? Shaking Chills  ? Redness or excessive drainage from incision  ? Nausea, vomiting and/or pain that is not controlled by prescribed medications  ? Increase in bleeding or bleeding that is excessive  ? Unable to urinate in 6 hours after surgery  ? If unable to reach your doctor, please go to the closest Emergency Room You may shower or bathe  .  ? Apply an ice pack 24-48 hours.  ? You may experience gas discomfort 24-48 hours after discharge, especially in chest and shoulders.  Changing position frequently may alleviate this discomfort.  ? If you have excessive pain, swelling, redness, drainage or other problems, notify your physician.  ? If unable to urinate in 6 to 8 hours after surgery or urinating  frequently in small amounts, notify your doctor or go to the nearest Emergency Room.  ? Medications per physician instructions as indicated on Discharge Medication Information Sheet.  You should see   for follow-up care  on  .  Phone number:      SPECIAL INSTRUCTIONS:                        I have read and received the above instructions.     Patient/Responsible Party's Signature Date/Time     RN Signature Date/Time

## 2022-01-14 NOTE — ANESTHESIA PREPROCEDURE EVALUATION
Anesthesia Evaluation     Patient summary reviewed and Nursing notes reviewed   no history of anesthetic complications:  NPO Solid Status: > 8 hours  NPO Liquid Status: > 2 hours           Airway   Mallampati: II  TM distance: >3 FB  Neck ROM: full  No difficulty expected  Dental      Pulmonary - normal exam    breath sounds clear to auscultation  (+) sleep apnea,   Cardiovascular - normal exam  Exercise tolerance: good (4-7 METS)    Rhythm: regular    (+) hypertension, hyperlipidemia,       Neuro/Psych- negative ROS  GI/Hepatic/Renal/Endo    (+)  GERD, GI bleeding lower , diabetes mellitus,     Musculoskeletal (-) negative ROS    Abdominal    Substance History - negative use     OB/GYN negative ob/gyn ROS         Other - negative ROS                       Anesthesia Plan    ASA 2     general       Anesthetic plan, all risks, benefits, and alternatives have been provided, discussed and informed consent has been obtained with: patient, spouse/significant other and other.

## 2022-01-22 ENCOUNTER — APPOINTMENT (OUTPATIENT)
Dept: GENERAL RADIOLOGY | Facility: HOSPITAL | Age: 72
End: 2022-01-22

## 2022-01-22 ENCOUNTER — HOSPITAL ENCOUNTER (EMERGENCY)
Facility: HOSPITAL | Age: 72
Discharge: HOME | End: 2022-01-22
Attending: EMERGENCY MEDICINE | Admitting: EMERGENCY MEDICINE

## 2022-01-22 VITALS
RESPIRATION RATE: 18 BRPM | SYSTOLIC BLOOD PRESSURE: 130 MMHG | DIASTOLIC BLOOD PRESSURE: 63 MMHG | OXYGEN SATURATION: 98 % | BODY MASS INDEX: 33.74 KG/M2 | WEIGHT: 214.95 LBS | HEIGHT: 67 IN | TEMPERATURE: 98.1 F | HEART RATE: 54 BPM

## 2022-01-22 DIAGNOSIS — R42 DIZZINESS: Primary | ICD-10-CM

## 2022-01-22 LAB
ALBUMIN SERPL-MCNC: 4.3 G/DL (ref 3.5–5.2)
ALBUMIN/GLOB SERPL: 1.4 G/DL
ALP SERPL-CCNC: 108 U/L (ref 39–117)
ALT SERPL W P-5'-P-CCNC: 32 U/L (ref 1–41)
ANION GAP SERPL CALCULATED.3IONS-SCNC: 14.4 MMOL/L (ref 5–15)
AST SERPL-CCNC: 19 U/L (ref 1–40)
BASOPHILS # BLD AUTO: 0.03 10*3/MM3 (ref 0–0.2)
BASOPHILS NFR BLD AUTO: 0.3 % (ref 0–1.5)
BILIRUB SERPL-MCNC: 0.4 MG/DL (ref 0–1.2)
BILIRUB UR QL STRIP: NEGATIVE
BUN SERPL-MCNC: 17 MG/DL (ref 8–23)
BUN/CREAT SERPL: 18.5 (ref 7–25)
CALCIUM SPEC-SCNC: 10.1 MG/DL (ref 8.6–10.5)
CHLORIDE SERPL-SCNC: 102 MMOL/L (ref 98–107)
CLARITY UR: CLEAR
CO2 SERPL-SCNC: 25.6 MMOL/L (ref 22–29)
COLOR UR: YELLOW
CREAT SERPL-MCNC: 0.92 MG/DL (ref 0.76–1.27)
DEPRECATED RDW RBC AUTO: 41.1 FL (ref 37–54)
EOSINOPHIL # BLD AUTO: 0.12 10*3/MM3 (ref 0–0.4)
EOSINOPHIL NFR BLD AUTO: 1.2 % (ref 0.3–6.2)
ERYTHROCYTE [DISTWIDTH] IN BLOOD BY AUTOMATED COUNT: 12.8 % (ref 12.3–15.4)
GFR SERPL CREATININE-BSD FRML MDRD: 81 ML/MIN/1.73
GLOBULIN UR ELPH-MCNC: 3.1 GM/DL
GLUCOSE SERPL-MCNC: 118 MG/DL (ref 65–99)
GLUCOSE UR STRIP-MCNC: NEGATIVE MG/DL
HCT VFR BLD AUTO: 44 % (ref 37.5–51)
HGB BLD-MCNC: 15.7 G/DL (ref 13–17.7)
HGB UR QL STRIP.AUTO: NEGATIVE
HOLD SPECIMEN: NORMAL
HOLD SPECIMEN: NORMAL
IMM GRANULOCYTES # BLD AUTO: 0.02 10*3/MM3 (ref 0–0.05)
IMM GRANULOCYTES NFR BLD AUTO: 0.2 % (ref 0–0.5)
KETONES UR QL STRIP: NEGATIVE
LEUKOCYTE ESTERASE UR QL STRIP.AUTO: NEGATIVE
LYMPHOCYTES # BLD AUTO: 2.46 10*3/MM3 (ref 0.7–3.1)
LYMPHOCYTES NFR BLD AUTO: 23.6 % (ref 19.6–45.3)
MAGNESIUM SERPL-MCNC: 1.7 MG/DL (ref 1.6–2.4)
MCH RBC QN AUTO: 31.2 PG (ref 26.6–33)
MCHC RBC AUTO-ENTMCNC: 35.7 G/DL (ref 31.5–35.7)
MCV RBC AUTO: 87.3 FL (ref 79–97)
MONOCYTES # BLD AUTO: 0.97 10*3/MM3 (ref 0.1–0.9)
MONOCYTES NFR BLD AUTO: 9.3 % (ref 5–12)
NEUTROPHILS NFR BLD AUTO: 6.82 10*3/MM3 (ref 1.7–7)
NEUTROPHILS NFR BLD AUTO: 65.4 % (ref 42.7–76)
NITRITE UR QL STRIP: NEGATIVE
NRBC BLD AUTO-RTO: 0 /100 WBC (ref 0–0.2)
PH UR STRIP.AUTO: 5.5 [PH] (ref 5–8)
PLATELET # BLD AUTO: 218 10*3/MM3 (ref 140–450)
PMV BLD AUTO: 9.7 FL (ref 6–12)
POTASSIUM SERPL-SCNC: 4.1 MMOL/L (ref 3.5–5.2)
PROT SERPL-MCNC: 7.4 G/DL (ref 6–8.5)
PROT UR QL STRIP: NEGATIVE
QT INTERVAL: 436 MS
RBC # BLD AUTO: 5.04 10*6/MM3 (ref 4.14–5.8)
SODIUM SERPL-SCNC: 142 MMOL/L (ref 136–145)
SP GR UR STRIP: 1.02 (ref 1–1.03)
TROPONIN T SERPL-MCNC: <0.01 NG/ML (ref 0–0.03)
UROBILINOGEN UR QL STRIP: NORMAL
WBC NRBC COR # BLD: 10.42 10*3/MM3 (ref 3.4–10.8)
WHOLE BLOOD HOLD SPECIMEN: NORMAL
WHOLE BLOOD HOLD SPECIMEN: NORMAL

## 2022-01-22 PROCEDURE — 81003 URINALYSIS AUTO W/O SCOPE: CPT | Performed by: EMERGENCY MEDICINE

## 2022-01-22 PROCEDURE — 80053 COMPREHEN METABOLIC PANEL: CPT | Performed by: EMERGENCY MEDICINE

## 2022-01-22 PROCEDURE — 63710000001 ONDANSETRON ODT 4 MG TABLET DISPERSIBLE: Performed by: EMERGENCY MEDICINE

## 2022-01-22 PROCEDURE — 71045 X-RAY EXAM CHEST 1 VIEW: CPT

## 2022-01-22 PROCEDURE — 93005 ELECTROCARDIOGRAM TRACING: CPT

## 2022-01-22 PROCEDURE — 83735 ASSAY OF MAGNESIUM: CPT | Performed by: EMERGENCY MEDICINE

## 2022-01-22 PROCEDURE — 93005 ELECTROCARDIOGRAM TRACING: CPT | Performed by: EMERGENCY MEDICINE

## 2022-01-22 PROCEDURE — 99283 EMERGENCY DEPT VISIT LOW MDM: CPT

## 2022-01-22 PROCEDURE — 36415 COLL VENOUS BLD VENIPUNCTURE: CPT

## 2022-01-22 PROCEDURE — 85025 COMPLETE CBC W/AUTO DIFF WBC: CPT | Performed by: EMERGENCY MEDICINE

## 2022-01-22 PROCEDURE — 84484 ASSAY OF TROPONIN QUANT: CPT | Performed by: EMERGENCY MEDICINE

## 2022-01-22 RX ORDER — ONDANSETRON 4 MG/1
4 TABLET, ORALLY DISINTEGRATING ORAL EVERY 4 HOURS PRN
Qty: 20 TABLET | Refills: 0 | Status: SHIPPED | OUTPATIENT
Start: 2022-01-22 | End: 2022-02-02

## 2022-01-22 RX ORDER — SODIUM CHLORIDE 0.9 % (FLUSH) 0.9 %
10 SYRINGE (ML) INJECTION AS NEEDED
Status: DISCONTINUED | OUTPATIENT
Start: 2022-01-22 | End: 2022-01-22 | Stop reason: HOSPADM

## 2022-01-22 RX ORDER — ONDANSETRON 4 MG/1
4 TABLET, ORALLY DISINTEGRATING ORAL EVERY 4 HOURS PRN
Qty: 20 TABLET | Refills: 0 | Status: SHIPPED | OUTPATIENT
Start: 2022-01-22 | End: 2022-01-22 | Stop reason: SDUPTHER

## 2022-01-22 RX ORDER — ONDANSETRON 4 MG/1
4 TABLET, ORALLY DISINTEGRATING ORAL ONCE
Status: COMPLETED | OUTPATIENT
Start: 2022-01-22 | End: 2022-01-22

## 2022-01-22 RX ADMIN — ONDANSETRON 4 MG: 4 TABLET, ORALLY DISINTEGRATING ORAL at 14:40

## 2022-01-22 NOTE — ED PROVIDER NOTES
"Time: 11:43 AM EST  Arrived by: car  Chief Complaint: dizziness, nausea  History provided by: patient, wife  History is limited by: N/A     History of Present Illness:  Patient is a 71 y.o. year old male that presents to the emergency department with constant DIZZINESS and NAUSEA which began mid December 2021. Dizziness is described as lightheadedness. Nothing makes dizziness better or worse.     Patient reports he has had a slight headache since mid December. He notes some mild chest pain.  He denies any ear pain or ringing. He denies any one-sided weakness or numbness. He denies any fever, cough, diarrhea or dysuria. He states he has been \"losing a pound a day since December\" and has lost 20 pounds since Christmas.      Patient states this is his 5th ED visit for these symptoms and has followed up with his PCP. He reports history of PTSD and anxiety. He states he stopped his Lexapro and another anxiety medication in November prior to onset of symptoms. He restarted his medications 3 days ago.  He denies any SI, HI or hallucinations.     He states he had a cholecystectomy on 1/14/22.      Dizziness  Quality:  Lightheadedness  Severity:  Moderate  Duration: mid December.  Timing:  Constant  Relieved by:  Nothing  Worsened by:  Nothing  Associated symptoms: chest pain, headaches and nausea    Associated symptoms: no diarrhea, no shortness of breath, no vomiting and no weakness            Patient Care Team  Primary Care Provider: Samy Wing MD    Past Medical History:     Allergies   Allergen Reactions   • Amoxicillin Hives and Rash     Other reaction(s): rash     Past Medical History:   Diagnosis Date   • Abdominal pain    • Acid reflux    • Anemia    • Arthritis    • Bladder disorder    • Blood disease    • Bowel obstruction (HCC)    • Chronic post-traumatic stress disorder (PTSD)    • Depression    • Diabetes (HCC)    • Diabetes mellitus (HCC)    • GERD (gastroesophageal reflux disease)    • High blood " pressure    • High cholesterol    • Hyperlipemia    • Hyperlipidemia    • Hypertension    • Left elbow pain 06/18/2018   • Left shoulder pain 05/17/2018   • Left wrist pain 06/22/2018   • Mood disorder (HCC)    • Rectal bleeding    • Sleep apnea     cpap      Past Surgical History:   Procedure Laterality Date   • CHOLECYSTECTOMY N/A 1/14/2022    Procedure: CHOLECYSTECTOMY LAPAROSCOPIC;  Surgeon: Abdulkadir Sol MD;  [facility]: [facility] OR [facility];  Service: General;  Laterality: N/A;   • COLONOSCOPY     • HEMORRHOIDECTOMY      BANDED, 2004,2006, 2007, 2020   • INGUINAL HERNIA REPAIR  1998   • TENDON REPAIR      LEFT HAND     Family History   Problem Relation Age of Onset   • Heart disease Mother    • Cancer Mother    • Kidney cancer Mother    • Stroke Father    • Kidney cancer Father    • Arthritis Father    • Malig Hyperthermia Neg Hx        Home Medications:  Prior to Admission medications    Medication Sig Start Date End Date Taking? Authorizing Provider   Aspirin Low Dose 81 MG EC tablet Take 81 mg by mouth Daily. Has been off for over 1 week per pt 6/18/21   Emergency, Nurse LEAH Coello   buPROPion XL (WELLBUTRIN XL) 300 MG 24 hr tablet bupropion HCl 300 mg oral tablet extended release 24 hr take 1 tablet (300 mg) by oral route once daily   Active    Emergency, Nurse LEAH Coello   carBAMazepine (TEGretol) 200 MG tablet Take 200 mg by mouth Every Night.    ProviderFranchesca MD   carboxymethylcellulose (REFRESH PLUS) 0.5 % solution 3 (Three) Times a Day As Needed for Dry Eyes.    ProviderFranchesca MD   cyproheptadine (PERIACTIN) 4 MG tablet Take 4 mg by mouth Every Night.    ProviderFranchesca MD   dilTIAZem (Tiazac) 180 MG 24 hr capsule Take 180 mg by mouth Every Night.    Emergency, Nurse LEAH Coello   docusate sodium (COLACE) 100 MG capsule Take 100 mg by mouth 2 (Two) Times a Day. 12/28/21   Franchesca Le MD   escitalopram (Lexapro) 20 MG tablet Take 20 mg by mouth Every Night. Currently out of but  planning on starting back up    Emergency, Nurse LEAH Coello   HYDROcodone-acetaminophen (Norco) 5-325 MG per tablet Take 1 tablet by mouth Every 6 (Six) Hours As Needed for Mild Pain . 22   Abdulkadir Sol MD   Hydrocortisone, Perianal, (ANUSOL-HC) 2.5 % rectal cream Insert  into the rectum As Needed. 10/19/21   Provider, MD Franchesca   lisinopril (PRINIVIL,ZESTRIL) 10 MG tablet  21   Emergency, Nurse LEAH Coello   metFORMIN (GLUCOPHAGE) 250 MG half tablet Take 250 mg by mouth 2 (Two) Times a Day With Meals.    Provider, MD Franchesca   pantoprazole (PROTONIX) 40 MG EC tablet Take 40 mg by mouth Daily. 21   Emergency, Nurse Epic, RN   polyethylene glycol (MIRALAX) 17 g packet Take 17 g by mouth Daily. 22   Abdulkadir Sol MD   rosuvastatin (CRESTOR) 20 MG tablet Take 20 mg by mouth Every Night. 21   Emergency, Nurse LEAH Coello   Vitamin D 125 MCG (5000 UT) capsule capsule  21   Emergency, Nurse Oumou RN        Social History:   Social History     Tobacco Use   • Smoking status: Former Smoker     Quit date: 1970     Years since quittin.6   • Smokeless tobacco: Never Used   Vaping Use   • Vaping Use: Never used   Substance Use Topics   • Alcohol use: Never   • Drug use: Never         Review of Systems:  Review of Systems   Constitutional: Positive for unexpected weight change. Negative for chills and fever.   HENT: Negative for ear pain.    Eyes: Negative for photophobia.   Respiratory: Negative for cough and shortness of breath.    Cardiovascular: Positive for chest pain.   Gastrointestinal: Positive for nausea. Negative for abdominal pain, diarrhea and vomiting.   Genitourinary: Negative for dysuria.   Musculoskeletal: Negative for back pain and neck pain.   Skin: Negative for rash.   Neurological: Positive for dizziness and headaches. Negative for weakness and numbness.   Psychiatric/Behavioral: Negative for hallucinations and suicidal ideas.        Physical  "Exam:  /63 (BP Location: Left arm, Patient Position: Lying)   Pulse 54   Temp 98.1 °F (36.7 °C) (Oral)   Resp 18   Ht 170.2 cm (67\")   Wt 97.5 kg (214 lb 15.2 oz)   SpO2 98%   BMI 33.67 kg/m²     Physical Exam  Vitals and nursing note reviewed.   Constitutional:       General: He is not in acute distress.  HENT:      Head: Normocephalic and atraumatic.   Cardiovascular:      Rate and Rhythm: Normal rate and regular rhythm.      Heart sounds: No murmur heard.      Pulmonary:      Effort: No respiratory distress.      Breath sounds: Normal breath sounds.   Abdominal:      Palpations: Abdomen is soft.      Tenderness: There is no abdominal tenderness.   Musculoskeletal:      Cervical back: Neck supple.   Skin:     General: Skin is warm and dry.      Findings: No rash.   Neurological:      General: No focal deficit present.      Mental Status: He is alert and oriented to person, place, and time.      Sensory: Sensation is intact.      Motor: Motor function is intact.      Coordination: Coordination is intact.   Psychiatric:         Attention and Perception: He does not perceive auditory or visual hallucinations.         Thought Content: Thought content does not include homicidal or suicidal ideation.                Medications in the Emergency Department:  Medications   ondansetron ODT (ZOFRAN-ODT) disintegrating tablet 4 mg (4 mg Oral Given 1/22/22 1440)        Labs  Lab Results (last 24 hours)     Procedure Component Value Units Date/Time    CBC & Differential [[id number]]  (Abnormal) Collected: 01/22/22 1103    Specimen: Blood Updated: 01/22/22 1122    Narrative:      The following orders were created for panel order CBC & Differential.  Procedure                               Abnormality         Status                     ---------                               -----------         ------                     CBC Auto Differential[[id number]]        Abnormal            Final result                 Please " view results for these tests on the individual orders.    Comprehensive Metabolic Panel [[id number]]  (Abnormal) Collected: 01/22/22 1103    Specimen: Blood Updated: 01/22/22 1150     Glucose 118 mg/dL      BUN 17 mg/dL      Creatinine 0.92 mg/dL      Sodium 142 mmol/L      Potassium 4.1 mmol/L      Chloride 102 mmol/L      CO2 25.6 mmol/L      Calcium 10.1 mg/dL      Total Protein 7.4 g/dL      Albumin 4.30 g/dL      ALT (SGPT) 32 U/L      AST (SGOT) 19 U/L      Alkaline Phosphatase 108 U/L      Total Bilirubin 0.4 mg/dL      eGFR Non African Amer 81 mL/min/1.73      Globulin 3.1 gm/dL      A/G Ratio 1.4 g/dL      BUN/Creatinine Ratio 18.5     Anion Gap 14.4 mmol/L     Narrative:      GFR Normal >60  Chronic Kidney Disease <60  Kidney Failure <15      Troponin [[id number]]  (Normal) Collected: 01/22/22 1103    Specimen: Blood Updated: 01/22/22 1148     Troponin T <0.010 ng/mL     Narrative:      Troponin T Reference Range:  <= 0.03 ng/mL-   Negative for AMI  >0.03 ng/mL-     Abnormal for myocardial necrosis.  Clinicians would have to utilize clinical acumen, EKG, Troponin and serial changes to determine if it is an Acute Myocardial Infarction or myocardial injury due to an underlying chronic condition.       Results may be falsely decreased if patient taking Biotin.      Magnesium [[id number]]  (Normal) Collected: 01/22/22 1103    Specimen: Blood Updated: 01/22/22 1150     Magnesium 1.7 mg/dL     CBC Auto Differential [[id number]]  (Abnormal) Collected: 01/22/22 1103    Specimen: Blood Updated: 01/22/22 1122     WBC 10.42 10*3/mm3      RBC 5.04 10*6/mm3      Hemoglobin 15.7 g/dL      Hematocrit 44.0 %      MCV 87.3 fL      MCH 31.2 pg      MCHC 35.7 g/dL      RDW 12.8 %      RDW-SD 41.1 fl      MPV 9.7 fL      Platelets 218 10*3/mm3      Neutrophil % 65.4 %      Lymphocyte % 23.6 %      Monocyte % 9.3 %      Eosinophil % 1.2 %      Basophil % 0.3 %      Immature Grans % 0.2 %      Neutrophils, Absolute 6.82 10*3/mm3       Lymphocytes, Absolute 2.46 10*3/mm3      Monocytes, Absolute 0.97 10*3/mm3      Eosinophils, Absolute 0.12 10*3/mm3      Basophils, Absolute 0.03 10*3/mm3      Immature Grans, Absolute 0.02 10*3/mm3      nRBC 0.0 /100 WBC     Urinalysis With Microscopic If Indicated (No Culture) - Urine, Clean Catch [[id number]]  (Normal) Collected: 01/22/22 1118    Specimen: Urine, Clean Catch Updated: 01/22/22 1126     Color, UA Yellow     Appearance, UA Clear     pH, UA 5.5     Specific Gravity, UA 1.021     Glucose, UA Negative     Ketones, UA Negative     Bilirubin, UA Negative     Blood, UA Negative     Protein, UA Negative     Leuk Esterase, UA Negative     Nitrite, UA Negative     Urobilinogen, UA 1.0 E.U./dL    Narrative:      Urine microscopic not indicated.           Imaging:  XR Chest 1 View    Result Date: 1/22/2022  PROCEDURE: XR CHEST 1 VW  COMPARISON: [facility], CR, CHEST PA/AP & LAT 2V, 7/04/2017, 16:48.  INDICATIONS: Weak/Dizzy/AMS triage protocol  FINDINGS:  The heart and mediastinal contours are within normal limits.  The lungs are grossly clear.  Osseous structures are unremarkable.       No acute process       REID GAMEZ MD       Electronically Signed and Approved By: REID GAMEZ MD on 1/22/2022 at 12:49               Procedures:  Procedures    Progress  ED Course as of 01/22/22 2152   Sat Jan 22, 2022   1142 EKG interpretation: Normal sinus rhythm, heart rate 63, normal CT and QT intervals, normal QRS duration, left axis deviation, nonspecific ST segment changes with no acute ischemia. [RP]      ED Course User Index  [RP] Agustin Lemos MD                            Medical Decision Making:  MDM  Number of Diagnoses or Management Options  Dizziness: established and worsening     Amount and/or Complexity of Data Reviewed  Clinical lab tests: reviewed  Tests in the medicine section of CPT®: reviewed  Independent visualization of images, tracings, or specimens: yes    Risk of Complications,  Morbidity, and/or Mortality  Presenting problems: moderate  Management options: low    Patient Progress  Patient progress: stable       Final diagnoses:   Dizziness        Disposition:  ED Disposition     ED Disposition Condition Comment    Discharge Stable           This medical record created using voice recognition software and may contain unintended errors.    Documentation assistance provided by Riri Easton acting as scribe for Dr. Agustin Lemos. Information recorded by the scribe was done at my direction and has been verified and validated by me.            Riri Easton  01/22/22 [id number]       Agustin Lemos MD  01/22/22 0756

## 2022-01-22 NOTE — DISCHARGE INSTRUCTIONS
Stay well-hydrated.  Follow-up with your family doctor today as we discussed for further outpatient testing.  Return to the emergency department immediately for chest pain, difficulty breathing, sudden onset of one-sided numbness or weakness.

## 2022-01-25 ENCOUNTER — TELEPHONE (OUTPATIENT)
Dept: ORTHOPEDIC SURGERY | Facility: CLINIC | Age: 72
End: 2022-01-25

## 2022-01-25 NOTE — TELEPHONE ENCOUNTER
PER PATIENT HE IS HAVING INTESTINAL ISSUES AND HAS LOST 20 LBS. NEEDS TO CANCEL SURGERY AT THIS TIME. HE IS NOT SURE WHEN HE WILL BE ABLE TO HAVE SURGERY DUE TO OTHER MEDICAL ISSUES.

## 2022-02-01 ENCOUNTER — OFFICE VISIT (OUTPATIENT)
Dept: SURGERY | Facility: CLINIC | Age: 72
End: 2022-02-01

## 2022-02-01 VITALS — RESPIRATION RATE: 16 BRPM | WEIGHT: 212.2 LBS | HEIGHT: 67 IN | BODY MASS INDEX: 33.3 KG/M2

## 2022-02-01 DIAGNOSIS — K81.9 CHOLECYSTITIS: Primary | ICD-10-CM

## 2022-02-01 PROCEDURE — 99024 POSTOP FOLLOW-UP VISIT: CPT | Performed by: SURGERY

## 2022-02-01 RX ORDER — MEGESTROL ACETATE 40 MG/ML
SUSPENSION ORAL
COMMUNITY
Start: 2022-01-18 | End: 2022-02-02

## 2022-02-01 RX ORDER — CHLORHEXIDINE GLUCONATE 0.12 MG/ML
RINSE ORAL
COMMUNITY
Start: 2022-01-24 | End: 2022-02-02

## 2022-02-02 ENCOUNTER — OFFICE VISIT (OUTPATIENT)
Dept: GASTROENTEROLOGY | Facility: CLINIC | Age: 72
End: 2022-02-02

## 2022-02-02 ENCOUNTER — PREP FOR SURGERY (OUTPATIENT)
Dept: OTHER | Facility: HOSPITAL | Age: 72
End: 2022-02-02

## 2022-02-02 VITALS
DIASTOLIC BLOOD PRESSURE: 70 MMHG | BODY MASS INDEX: 33.46 KG/M2 | OXYGEN SATURATION: 97 % | WEIGHT: 213.2 LBS | HEART RATE: 75 BPM | HEIGHT: 67 IN | SYSTOLIC BLOOD PRESSURE: 179 MMHG

## 2022-02-02 DIAGNOSIS — R10.84 GENERALIZED ABDOMINAL PAIN: Primary | ICD-10-CM

## 2022-02-02 DIAGNOSIS — R63.4 WEIGHT LOSS: ICD-10-CM

## 2022-02-02 DIAGNOSIS — R53.81 MALAISE AND FATIGUE: ICD-10-CM

## 2022-02-02 DIAGNOSIS — R19.5 DARK STOOLS: ICD-10-CM

## 2022-02-02 DIAGNOSIS — R53.83 MALAISE AND FATIGUE: ICD-10-CM

## 2022-02-02 PROCEDURE — 99204 OFFICE O/P NEW MOD 45 MIN: CPT | Performed by: NURSE PRACTITIONER

## 2022-02-02 RX ORDER — FERROUS SULFATE 325(65) MG
325 TABLET ORAL
COMMUNITY
End: 2022-05-31

## 2022-02-02 RX ORDER — POLYETHYLENE GLYCOL 3350, SODIUM SULFATE ANHYDROUS, SODIUM BICARBONATE, SODIUM CHLORIDE, POTASSIUM CHLORIDE 227.1; 21.5; 6.36; 5.53; .754 G/L; G/L; G/L; G/L; G/L
4 POWDER, FOR SOLUTION ORAL DAILY
Qty: 1 EACH | Refills: 0 | Status: SHIPPED | OUTPATIENT
Start: 2022-02-02 | End: 2022-02-03

## 2022-02-02 NOTE — PROGRESS NOTES
Patient Name: Christopher Finley   Visit Date: 02/02/2022   Patient ID: [id number]  Provider: CELENA Ritter    Sex: male  Location:  Location Address:  Location Phone: [address]  [place] [address] [phone]    YOB: 1950  Age: 71 y.o.   Primary Care Provider Samy Wing MD      Referring Provider: No ref. provider found        Chief Complaint  Nausea (Cold sweats, weak, fatigued ), Abdominal Pain (discomfort), and Diarrhea    History of Present Illness  New pt w c/o abd pain, states soon after eating he feels hungry again, describes eating frequently and mod/large portion sizes, denies abd pain w meals, concerned because he has lost 25# since December. Denies new medications; on Metformin since 2010. Denies HB, on Protonix. Denies dysphagia.   No diarrhea, takes 1/2 tsp Metamucil before bed. No blood in stool, but has seen dark stools that are stringy.   States he has new tremors, trouble writing, feels weak and is having trouble concentrating and trouble walking d/t weakness. States he has seen PCP for these c/o also.   Pt is depressed d/t these GI sx and reports counting the hrs before bedtime since he doesn't feel discomfort when sleeping.    Ultrasound of the gallbladder 1/10/2022: Evidence for stones and sludge in the gallbladder, no signs of cholecystitis, no biliary dilatation  CT of the abdomen and pelvis with contrast 1/10/2022: Cholelithiasis otherwise negative->gallbladder removal 1/14/2022 -- pt states upper abd pain relieved.   Last Colon 1/2019: adequate prep large external hemorrhoids Dr Mckinney  Past Medical History:   Diagnosis Date   • Abdominal pain    • Acid reflux    • Anemia    • Arthritis    • Bladder disorder    • Blood disease    • Bowel obstruction (HCC)    • Chronic post-traumatic stress disorder (PTSD)    • Depression    • Diabetes (HCC)    • Diabetes mellitus (HCC)    • GERD (gastroesophageal reflux disease)    • High blood pressure    • High  "cholesterol    • Hyperlipemia    • Hyperlipidemia    • Hypertension    • Left elbow pain 2018   • Left shoulder pain 2018   • Left wrist pain 2018   • Mood disorder (HCC)    • Rectal bleeding    • Sleep apnea     cpap        Past Surgical History:   Procedure Laterality Date   • CHOLECYSTECTOMY N/A 2022    Procedure: CHOLECYSTECTOMY LAPAROSCOPIC;  Surgeon: Abdulkadir Sol MD;  [place] [facility] [place] [facility] [place]  Service: General;  Laterality: N/A;   • COLONOSCOPY     • HEMORRHOIDECTOMY      BANDED, ,, ,    • INGUINAL HERNIA REPAIR     • TENDON REPAIR      LEFT HAND   • UPPER GASTROINTESTINAL ENDOSCOPY         Allergies   Allergen Reactions   • Amoxicillin Hives and Rash     Other reaction(s): rash       Family History   Problem Relation Age of Onset   • Heart disease Mother    • Cancer Mother    • Kidney cancer Mother    • Stroke Father    • Kidney cancer Father    • Arthritis Father    • Malig Hyperthermia Neg Hx    • Colon cancer Neg Hx         Social History     Tobacco Use   • Smoking status: Former Smoker     Quit date: 1970     Years since quittin.6   • Smokeless tobacco: Never Used   Vaping Use   • Vaping Use: Never used   Substance Use Topics   • Alcohol use: Never   • Drug use: Never       Objective     Vital Signs:   /70 (BP Location: Left arm, Patient Position: Sitting, Cuff Size: Adult)   Pulse 75   Ht 170.2 cm (67\")   Wt 96.7 kg (213 lb 3.2 oz)   SpO2 97%   BMI 33.39 kg/m²       Physical Exam  Constitutional:       General: The patient is not in acute distress.     Appearance: Normal appearance.   HENT:      Head: Normocephalic and atraumatic.      Nose: Nose normal.   Pulmonary:      Effort: Pulmonary effort is normal. No respiratory distress.   Abdominal:      General: Abdomen is flat.      Palpations: Abdomen is soft. There is no mass.      Tenderness: There is no abdominal tenderness. There is no guarding.   Musculoskeletal:      Cervical " back: Neck supple.      Right lower leg: No edema.      Left lower leg: No edema.   Skin:     General: Skin is warm and dry.   Neurological:      General: No focal deficit present.      Mental Status: The patient is alert and oriented to person, place, and time.      Gait: Gait normal.   Psychiatric:         Mood and Affect: Mood normal.         Speech: Speech normal.         Behavior: Behavior normal.         Thought Content: Thought content normal.     Result Review :   The following data was reviewed by: CELENA Ritter on 02/02/2022:  CMP    CMP 12/15/21 1/10/22 1/22/22   Glucose 124 (A) 122 (A) 118 (A)   BUN 14 16 17   Creatinine 1.02 0.98 0.92   eGFR Non African Am 72 75 81   Sodium 141 144 142   Potassium 4.2 4.6 4.1   Chloride 104 107 102   Calcium 9.8 10.2 10.1   Albumin 4.40 4.50 4.30   Total Bilirubin 0.3 0.3 0.4   Alkaline Phosphatase 97 87 108   AST (SGOT) 13 17 19   ALT (SGPT) 14 20 32   (A) Abnormal value            CBC    CBC 12/15/21 1/10/22 1/22/22   WBC 9.44 8.43 10.42   RBC 4.45 4.60 5.04   Hemoglobin 14.1 14.3 15.7   Hematocrit 38.9 40.9 44.0   MCV 87.4 88.9 87.3   MCH 31.7 31.1 31.2   MCHC 36.2 (A) 35.0 35.7   RDW 12.6 13.1 12.8   Platelets 187 175 218   (A) Abnormal value                      Assessment and Plan    Diagnoses and all orders for this visit:    1. Generalized abdominal pain (Primary)    2. Weight loss    3. Dark stools    4. Malaise and fatigue    Other orders  -     PEG 3350-KCl-NaBcb-NaCl-NaSulf (Golytely) 227.1 g pack; Take 4 L by mouth Daily for 1 day. Take per office instructions  Dispense: 1 each; Refill: 0            Follow Up      EGD/COLONOSCOPY Surgical Risk and Benefits: Possible risks/complications, benefits, and alternatives to surgical or invasive procedure have been explained to patient and/or legal guardian; risks include bleeding, infection, and perforation. Patient has been evaluated and can tolerate anesthesia and/or sedation. Risks, benefits, and  alternatives to anesthesia and sedation have been explained to patient and/or legal guardian.   Working in urgently d/t wt loss.     Patient was given instructions and counseling regarding his condition or for health maintenance advice. Please see specific information pulled into the AVS if appropriate.

## 2022-02-02 NOTE — PROGRESS NOTES
General Surgery/Colorectal Surgery Note    Patient Name:  Christopher Finley  YOB: 1950  [id number]    Referring Provider: Samy Wing MD      Patient Care Team:  Samy Wing MD as PCP - General (Internal Medicine)  Yordan Louis MD as Consulting Physician (General Surgery)  Abdulkadir Sol MD as Consulting Physician (General Surgery)    Chief complaint follow-up after surgery    Subjective .     History of present illness:    Status post laparoscopic cholecystectomy 1/14/2022.  Pathology with chronic cholecystitis, cholelithiasis.    He comes in for follow-up.  He is still having some abdominal complaints although he thinks the surgery helped the pain he was having prior to surgery.  He is scheduled to see gastroenterology in the near future.  No fever, erythema, drainage.      History:  Past Medical History:   Diagnosis Date   • Abdominal pain    • Acid reflux    • Anemia    • Arthritis    • Bladder disorder    • Blood disease    • Bowel obstruction (HCC)    • Chronic post-traumatic stress disorder (PTSD)    • Depression    • Diabetes (HCC)    • Diabetes mellitus (HCC)    • GERD (gastroesophageal reflux disease)    • High blood pressure    • High cholesterol    • Hyperlipemia    • Hyperlipidemia    • Hypertension    • Left elbow pain 06/18/2018   • Left shoulder pain 05/17/2018   • Left wrist pain 06/22/2018   • Mood disorder (HCC)    • Rectal bleeding    • Sleep apnea     cpap        Past Surgical History:   Procedure Laterality Date   • CHOLECYSTECTOMY N/A 1/14/2022    Procedure: CHOLECYSTECTOMY LAPAROSCOPIC;  Surgeon: Abdulkadir Sol MD;  Location: [facility] [place] [facility] [place];  Service: General;  Laterality: N/A;   • COLONOSCOPY     • HEMORRHOIDECTOMY      BANDED, 2004,2006, 2007, 2020   • INGUINAL HERNIA REPAIR  1998   • TENDON REPAIR      LEFT HAND   • UPPER GASTROINTESTINAL ENDOSCOPY         Family History   Problem Relation Age of Onset   • Heart disease Mother    • Cancer Mother     • Kidney cancer Mother    • Stroke Father    • Kidney cancer Father    • Arthritis Father    • Malig Hyperthermia Neg Hx    • Colon cancer Neg Hx        Social History     Tobacco Use   • Smoking status: Former Smoker     Quit date: 1970     Years since quittin.6   • Smokeless tobacco: Never Used   Vaping Use   • Vaping Use: Never used   Substance Use Topics   • Alcohol use: Never   • Drug use: Never       Review of Systems  All systems were reviewed and negative except for:   Review of Systems   Constitutional: Negative for chills, fever and unexpected weight loss.   HENT: Negative for congestion, nosebleeds and voice change.    Eyes: Negative for blurred vision, double vision and discharge.   Respiratory: Negative for apnea, chest tightness and shortness of breath.    Cardiovascular: Negative for chest pain and leg swelling.   Gastrointestinal:        See HPI   Endocrine: Negative for cold intolerance and heat intolerance.   Genitourinary: Negative for dysuria, hematuria and urgency.   Musculoskeletal: Negative for back pain, joint swelling and neck pain.   Skin: Negative for color change and dry skin.   Neurological: Negative for dizziness and confusion.   Hematological: Negative for adenopathy.   Psychiatric/Behavioral: Negative for agitation and behavioral problems.     MEDS:  Prior to Admission medications    Medication Sig Start Date End Date Taking? Authorizing Provider   Aspirin Low Dose 81 MG EC tablet Take 81 mg by mouth Daily. Has been off for over 1 week per pt 21  Yes Emergency, Nurse LEAH Coello   buPROPion XL (WELLBUTRIN XL) 300 MG 24 hr tablet bupropion HCl 300 mg oral tablet extended release 24 hr take 1 tablet (300 mg) by oral route once daily   Active   Yes Emergency, Nurse Oumou RN   carBAMazepine (TEGretol) 200 MG tablet Take 200 mg by mouth Every Night.   Yes Provider, MD Franchesca   carboxymethylcellulose (REFRESH PLUS) 0.5 % solution 3 (Three) Times a Day As Needed for Dry  Eyes.   Yes Franchesca Le MD   cyproheptadine (PERIACTIN) 4 MG tablet Take 4 mg by mouth Every Night.   Yes Franchesca Le MD   dilTIAZem (Tiazac) 180 MG 24 hr capsule Take 180 mg by mouth Every Night.   Yes Emergency, Nurse LEAH Coello   docusate sodium (COLACE) 100 MG capsule Take 100 mg by mouth 2 (Two) Times a Day. 12/28/21  Yes Franchesca Le MD   escitalopram (Lexapro) 20 MG tablet Take 20 mg by mouth Every Night. Currently out of but planning on starting back up   Yes Emergency, Nurse Epic, RN   Hydrocortisone, Perianal, (ANUSOL-HC) 2.5 % rectal cream Insert  into the rectum As Needed. 10/19/21  Yes Franchesca Le MD   lisinopril (PRINIVIL,ZESTRIL) 10 MG tablet  6/18/21  Yes Emergency, Nurse Epic, RN   metFORMIN (GLUCOPHAGE) 250 MG half tablet Take 250 mg by mouth 2 (Two) Times a Day With Meals.   Yes Franchesca Le MD   pantoprazole (PROTONIX) 40 MG EC tablet Take 40 mg by mouth Daily. 6/18/21  Yes Emergency, Nurse Oumou RN   rosuvastatin (CRESTOR) 20 MG tablet Take 20 mg by mouth Every Night. 6/18/21  Yes Emergency, Nurse Epic, RN   Vitamin D 125 MCG (5000 UT) capsule capsule  6/18/21  Yes Emergency, Nurse Oumou RN   chlorhexidine (PERIDEX) 0.12 % solution RINSE WITH 0.5 OZ FOR 30 SECONDS TWICE DAILY X 10 DAYS 1/24/22 2/2/22 Yes Franchesca Le MD   ferrous sulfate 325 (65 FE) MG tablet Take 325 mg by mouth Daily With Breakfast.    Franchesca Le MD   HYDROcodone-acetaminophen (Norco) 5-325 MG per tablet Take 1 tablet by mouth Every 6 (Six) Hours As Needed for Mild Pain . 1/14/22 2/2/22  Abdulkadir Sol MD   megestrol (MEGACE) 40 MG/ML suspension  1/18/22 2/2/22  Franchesca Le MD   ondansetron ODT (ZOFRAN-ODT) 4 MG disintegrating tablet Place 1 tablet on the tongue Every 4 (Four) Hours As Needed for Nausea or Vomiting. 1/22/22 2/2/22  Agustin Lemos MD   polyethylene glycol (MIRALAX) 17 g packet Take 17 g by mouth Daily. 1/14/22 2/2/22   "Abdulkadir Sol MD        Allergies:  Amoxicillin    Objective     Vital Signs   Heart Rate:  [75] 75  BP: (179)/(70) 179/70    Physical Exam: Incisions are clean dry intact evidence of infection, abdomen soft nontender, no hernia        Results Review:   {Results Review:82685::\"I reviewed the patient's new clinical results.\"    LABS/IMAGING:  Results for orders placed or performed during the hospital encounter of 01/22/22   Comprehensive Metabolic Panel    Specimen: Blood   Result Value Ref Range    Glucose 118 (H) 65 - 99 mg/dL    BUN 17 8 - 23 mg/dL    Creatinine 0.92 0.76 - 1.27 mg/dL    Sodium 142 136 - 145 mmol/L    Potassium 4.1 3.5 - 5.2 mmol/L    Chloride 102 98 - 107 mmol/L    CO2 25.6 22.0 - 29.0 mmol/L    Calcium 10.1 8.6 - 10.5 mg/dL    Total Protein 7.4 6.0 - 8.5 g/dL    Albumin 4.30 3.50 - 5.20 g/dL    ALT (SGPT) 32 1 - 41 U/L    AST (SGOT) 19 1 - 40 U/L    Alkaline Phosphatase 108 39 - 117 U/L    Total Bilirubin 0.4 0.0 - 1.2 mg/dL    eGFR Non African Amer 81 >60 mL/min/1.73    Globulin 3.1 gm/dL    A/G Ratio 1.4 g/dL    BUN/Creatinine Ratio 18.5 7.0 - 25.0    Anion Gap 14.4 5.0 - 15.0 mmol/L   Troponin    Specimen: Blood   Result Value Ref Range    Troponin T <0.010 0.000 - 0.030 ng/mL   Magnesium    Specimen: Blood   Result Value Ref Range    Magnesium 1.7 1.6 - 2.4 mg/dL   Urinalysis With Microscopic If Indicated (No Culture) - Urine, Clean Catch    Specimen: Urine, Clean Catch   Result Value Ref Range    Color, UA Yellow Yellow, Straw    Appearance, UA Clear Clear    pH, UA 5.5 5.0 - 8.0    Specific Gravity, UA 1.021 1.005 - 1.030    Glucose, UA Negative Negative    Ketones, UA Negative Negative    Bilirubin, UA Negative Negative    Blood, UA Negative Negative    Protein, UA Negative Negative    Leuk Esterase, UA Negative Negative    Nitrite, UA Negative Negative    Urobilinogen, UA 1.0 E.U./dL 0.2 - 1.0 E.U./dL   CBC Auto Differential    Specimen: Blood   Result Value Ref Range    WBC " 10.42 3.40 - 10.80 10*3/mm3    RBC 5.04 4.14 - 5.80 10*6/mm3    Hemoglobin 15.7 13.0 - 17.7 g/dL    Hematocrit 44.0 37.5 - 51.0 %    MCV 87.3 79.0 - 97.0 fL    MCH 31.2 26.6 - 33.0 pg    MCHC 35.7 31.5 - 35.7 g/dL    RDW 12.8 12.3 - 15.4 %    RDW-SD 41.1 37.0 - 54.0 fl    MPV 9.7 6.0 - 12.0 fL    Platelets 218 140 - 450 10*3/mm3    Neutrophil % 65.4 42.7 - 76.0 %    Lymphocyte % 23.6 19.6 - 45.3 %    Monocyte % 9.3 5.0 - 12.0 %    Eosinophil % 1.2 0.3 - 6.2 %    Basophil % 0.3 0.0 - 1.5 %    Immature Grans % 0.2 0.0 - 0.5 %    Neutrophils, Absolute 6.82 1.70 - 7.00 10*3/mm3    Lymphocytes, Absolute 2.46 0.70 - 3.10 10*3/mm3    Monocytes, Absolute 0.97 (H) 0.10 - 0.90 10*3/mm3    Eosinophils, Absolute 0.12 0.00 - 0.40 10*3/mm3    Basophils, Absolute 0.03 0.00 - 0.20 10*3/mm3    Immature Grans, Absolute 0.02 0.00 - 0.05 10*3/mm3    nRBC 0.0 0.0 - 0.2 /100 WBC   ECG 12 Lead   Result Value Ref Range    QT Interval 436 ms   Green Top (Gel)   Result Value Ref Range    Extra Tube Hold for add-ons.    Lavender Top   Result Value Ref Range    Extra Tube hold for add-on    Gold Top - SST   Result Value Ref Range    Extra Tube Hold for add-ons.    Light Blue Top   Result Value Ref Range    Extra Tube hold for add-on         Result Review :     Assessment/Plan     Status post laparoscopic cholecystectomy 1/14/2022.  Pathology with chronic cholecystitis, cholelithiasis.    I reviewed the pathology with the patient.  He may slowly increase his activity as tolerated.  Agree with GI consult.  Patient will likely need upper and lower endoscopy.  Follow-up with me as needed.  All questions answered.  He agrees with the plan.  Thank for the consult.                This document has been electronically signed by Abdulkadir Sol MD  February 2, 2022 14:58 EST

## 2022-02-07 NOTE — PRE-PROCEDURE INSTRUCTIONS
PT INSTRUCTED WHERE TO COME TO AND CLEAR LIQ DIET AND SPLIT PO PREP HE STATED THAT HE HAS HAD SEVERAL OF THESE PROCEDURES, HE CAN TAKE PROTONIX AND WELLBUTRIN IN THE AM ON Thursday 2/10/22 HOLD METFORMIN WED. NIGHT AND Thursday AM BLOOD SUGAR ORDERED ARRIVAL TIME IS 0900 AM ON THURS. 2/10/22

## 2022-02-10 ENCOUNTER — ANESTHESIA (OUTPATIENT)
Dept: GASTROENTEROLOGY | Facility: HOSPITAL | Age: 72
End: 2022-02-10

## 2022-02-10 ENCOUNTER — PATIENT ROUNDING (BHMG ONLY) (OUTPATIENT)
Dept: GASTROENTEROLOGY | Facility: CLINIC | Age: 72
End: 2022-02-10

## 2022-02-10 ENCOUNTER — ANESTHESIA EVENT (OUTPATIENT)
Dept: GASTROENTEROLOGY | Facility: HOSPITAL | Age: 72
End: 2022-02-10

## 2022-02-10 ENCOUNTER — HOSPITAL ENCOUNTER (OUTPATIENT)
Facility: HOSPITAL | Age: 72
Setting detail: HOSPITAL OUTPATIENT SURGERY
Discharge: HOME | End: 2022-02-10
Attending: INTERNAL MEDICINE | Admitting: INTERNAL MEDICINE

## 2022-02-10 VITALS
WEIGHT: 205.47 LBS | OXYGEN SATURATION: 93 % | HEIGHT: 67 IN | SYSTOLIC BLOOD PRESSURE: 132 MMHG | HEART RATE: 59 BPM | BODY MASS INDEX: 32.25 KG/M2 | DIASTOLIC BLOOD PRESSURE: 77 MMHG | TEMPERATURE: 98.7 F | RESPIRATION RATE: 13 BRPM

## 2022-02-10 DIAGNOSIS — R53.83 MALAISE AND FATIGUE: ICD-10-CM

## 2022-02-10 DIAGNOSIS — R10.84 GENERALIZED ABDOMINAL PAIN: ICD-10-CM

## 2022-02-10 DIAGNOSIS — R63.4 WEIGHT LOSS: ICD-10-CM

## 2022-02-10 DIAGNOSIS — R19.5 DARK STOOLS: ICD-10-CM

## 2022-02-10 DIAGNOSIS — R53.81 MALAISE AND FATIGUE: ICD-10-CM

## 2022-02-10 LAB — GLUCOSE BLDC GLUCOMTR-MCNC: 122 MG/DL (ref 70–99)

## 2022-02-10 PROCEDURE — 82962 GLUCOSE BLOOD TEST: CPT

## 2022-02-10 PROCEDURE — 25010000002 PROPOFOL 10 MG/ML EMULSION: Performed by: NURSE ANESTHETIST, CERTIFIED REGISTERED

## 2022-02-10 PROCEDURE — 45385 COLONOSCOPY W/LESION REMOVAL: CPT | Performed by: INTERNAL MEDICINE

## 2022-02-10 PROCEDURE — 88305 TISSUE EXAM BY PATHOLOGIST: CPT | Performed by: INTERNAL MEDICINE

## 2022-02-10 PROCEDURE — 43239 EGD BIOPSY SINGLE/MULTIPLE: CPT | Performed by: INTERNAL MEDICINE

## 2022-02-10 RX ORDER — LIDOCAINE HYDROCHLORIDE 20 MG/ML
INJECTION, SOLUTION INFILTRATION; PERINEURAL AS NEEDED
Status: DISCONTINUED | OUTPATIENT
Start: 2022-02-10 | End: 2022-02-10 | Stop reason: SURG

## 2022-02-10 RX ORDER — SODIUM CHLORIDE, SODIUM LACTATE, POTASSIUM CHLORIDE, CALCIUM CHLORIDE 600; 310; 30; 20 MG/100ML; MG/100ML; MG/100ML; MG/100ML
INJECTION, SOLUTION INTRAVENOUS CONTINUOUS PRN
Status: DISCONTINUED | OUTPATIENT
Start: 2022-02-10 | End: 2022-02-10 | Stop reason: SURG

## 2022-02-10 RX ORDER — PROPOFOL 10 MG/ML
VIAL (ML) INTRAVENOUS AS NEEDED
Status: DISCONTINUED | OUTPATIENT
Start: 2022-02-10 | End: 2022-02-10 | Stop reason: SURG

## 2022-02-10 RX ORDER — SODIUM CHLORIDE, SODIUM LACTATE, POTASSIUM CHLORIDE, CALCIUM CHLORIDE 600; 310; 30; 20 MG/100ML; MG/100ML; MG/100ML; MG/100ML
30 INJECTION, SOLUTION INTRAVENOUS CONTINUOUS
Status: DISCONTINUED | OUTPATIENT
Start: 2022-02-10 | End: 2022-02-10 | Stop reason: HOSPADM

## 2022-02-10 RX ADMIN — SODIUM CHLORIDE, POTASSIUM CHLORIDE, SODIUM LACTATE AND CALCIUM CHLORIDE 30 ML/HR: 600; 310; 30; 20 INJECTION, SOLUTION INTRAVENOUS at 07:34

## 2022-02-10 RX ADMIN — SODIUM CHLORIDE, POTASSIUM CHLORIDE, SODIUM LACTATE AND CALCIUM CHLORIDE: 600; 310; 30; 20 INJECTION, SOLUTION INTRAVENOUS at 07:39

## 2022-02-10 RX ADMIN — PROPOFOL 50 MG: 10 INJECTION, EMULSION INTRAVENOUS at 08:33

## 2022-02-10 RX ADMIN — PROPOFOL 100 MG: 10 INJECTION, EMULSION INTRAVENOUS at 08:06

## 2022-02-10 RX ADMIN — PROPOFOL 120 MCG/KG/MIN: 10 INJECTION, EMULSION INTRAVENOUS at 08:06

## 2022-02-10 RX ADMIN — LIDOCAINE HYDROCHLORIDE 100 MG: 20 INJECTION, SOLUTION INFILTRATION; PERINEURAL at 08:06

## 2022-02-10 NOTE — PROGRESS NOTES
February 10, 2022    Hello, may I speak with Christopher Finley?    My name is Ana Maria BURNS      I am  with [facility]  [address]  [place] [address] [phone].    Before we get started may I verify your date of birth? 1950    I am calling to officially welcome you to our practice and ask about your recent visit. Is this a good time to talk? Yes     Tell me about your visit with us. What things went well? Everything went good     We're always looking for ways to make our patients' experiences even better. Do you have recommendations on ways we may improve? No      Overall were you satisfied with your first visit to our practice? Yes        I appreciate you taking the time to speak with me today. Is there anything else I can do for you? Transferred to office he had questions about an MRI      Thank you, and have a great day.

## 2022-02-10 NOTE — ANESTHESIA POSTPROCEDURE EVALUATION
Patient: Christopher Finley    Procedure Summary     Date: 02/10/22 Room / Location: [facility] ENDOSCOPY 4 / [facility] ENDOSCOPY    Anesthesia Start: 0800 Anesthesia Stop: 0844    Procedures:       ESOPHAGOGASTRODUODENOSCOPY with biopsy (N/A )      COLONOSCOPY with random biopsies, polypectomy with cold snare (N/A ) Diagnosis:       Generalized abdominal pain      Weight loss      Dark stools      Malaise and fatigue      (Generalized abdominal pain [R10.84])      (Weight loss [R63.4])      (Dark stools [R19.5])      (Malaise and fatigue [R53.81, R53.83])    Surgeons: Lamont Hargrove MD Provider: Burt Lepe MD    Anesthesia Type: general ASA Status: 3          Anesthesia Type: general    Vitals  Vitals Value Taken Time   /77 02/10/22 0859   Temp 37.1 °C (98.7 °F) 02/10/22 0859   Pulse 59 02/10/22 0859   Resp 13 02/10/22 0859   SpO2 93 % 02/10/22 0859           Post Anesthesia Care and Evaluation    Patient location during evaluation: bedside  Patient participation: complete - patient participated  Level of consciousness: awake  Pain management: adequate  Airway patency: patent  Anesthetic complications: No anesthetic complications  PONV Status: none  Cardiovascular status: acceptable and stable  Respiratory status: acceptable  Hydration status: acceptable    Comments: An Anesthesiologist personally participated in the most demanding procedures (including induction and emergence if applicable) in the anesthesia plan, monitored the course of anesthesia administration at frequent intervals and remained physically present and available for immediate diagnosis and treatment of emergencies.

## 2022-02-10 NOTE — H&P
Pre Procedure History & Physical    Chief Complaint:   Dark stools diarrhea GI bleeding    Subjective     HPI:   Dark stools, diarrhea, GI bleeding    Past Medical History:   Past Medical History:   Diagnosis Date   • Abdominal pain    • Acid reflux    • Anemia    • Arthritis    • Bladder disorder    • Blood disease    • Bowel obstruction (HCC)    • Cancer (HCC)     RIGHT EAR SKIN CANCER REMOVED   • Chronic post-traumatic stress disorder (PTSD)    • Depression    • Diabetes (HCC)    • Diabetes mellitus (HCC)    • GERD (gastroesophageal reflux disease)    • High blood pressure    • High cholesterol    • Hyperlipemia    • Hyperlipidemia    • Hypertension    • Left elbow pain 06/18/2018   • Left shoulder pain 05/17/2018   • Left wrist pain 06/22/2018   • Mood disorder (HCC)    • Rectal bleeding    • Sleep apnea     cpap        Past Surgical History:  Past Surgical History:   Procedure Laterality Date   • CHOLECYSTECTOMY N/A 1/14/2022    Procedure: CHOLECYSTECTOMY LAPAROSCOPIC;  Surgeon: Abdulkadir Sol MD;  [place] [facility] [place] [facility] [place]  Service: General;  Laterality: N/A;   • COLONOSCOPY     • CYSTOSCOPY     • HEMORRHOIDECTOMY      BANDED, 2004,2006, 2007, 2020   • INGUINAL HERNIA REPAIR  1998    RIGHT   • MOUTH SURGERY Right     CYST REMOVED LOWER RIGHT JAW   • PROSTATE BIOPSY     • SIGMOIDOSCOPY     • TENDON REPAIR      LEFT HAND   • UPPER GASTROINTESTINAL ENDOSCOPY         Family History:  Family History   Problem Relation Age of Onset   • Heart disease Mother    • Cancer Mother    • Kidney cancer Mother    • Stroke Father    • Kidney cancer Father    • Arthritis Father    • Malig Hyperthermia Neg Hx    • Colon cancer Neg Hx        Social History:   reports that he quit smoking about 51 years ago. His smoking use included cigarettes. He has never used smokeless tobacco. He reports that he does not drink alcohol and does not use drugs.    Medications:   Medications Prior to Admission   Medication Sig Dispense  "Refill Last Dose   • Aspirin Low Dose 81 MG EC tablet Take 81 mg by mouth Daily. LAST DOSE Monday 2/7/22      • buPROPion XL (WELLBUTRIN XL) 300 MG 24 hr tablet Take 300 mg by mouth Every Morning.      • carBAMazepine (TEGretol) 200 MG tablet Take 200 mg by mouth Every Night.      • carboxymethylcellulose (REFRESH PLUS) 0.5 % solution Administer 1 drop to both eyes As Needed for Dry Eyes.      • cyproheptadine (PERIACTIN) 4 MG tablet Take 4 mg by mouth Every Night.      • dilTIAZem (Tiazac) 180 MG 24 hr capsule Take 180 mg by mouth Every Night.      • docusate sodium (COLACE) 100 MG capsule Take 100 mg by mouth As Needed.      • escitalopram (Lexapro) 20 MG tablet Take 20 mg by mouth Every Night. Currently out of but planning on starting back up      • ferrous sulfate 325 (65 FE) MG tablet Take 325 mg by mouth Daily With Breakfast.      • Hydrocortisone, Perianal, (ANUSOL-HC) 2.5 % rectal cream Insert 1 application into the rectum As Needed.      • lisinopril (PRINIVIL,ZESTRIL) 10 MG tablet Take 10 mg by mouth Daily.      • metFORMIN (GLUCOPHAGE) 250 MG half tablet Take 250 mg by mouth 2 (Two) Times a Day With Meals. Wednesday AM LAST DOSE TIL AFTER PROCEDURE      • pantoprazole (PROTONIX) 40 MG EC tablet Take 40 mg by mouth Daily.      • rosuvastatin (CRESTOR) 20 MG tablet Take 20 mg by mouth Every Night.      • Vitamin D 125 MCG (5000 UT) capsule capsule Take 5,000 Units by mouth Daily.          Allergies:  Amoxicillin        Objective     Blood pressure 145/79, pulse 86, temperature 97.7 °F (36.5 °C), temperature source Temporal, resp. rate 18, height 170.2 cm (67.01\"), weight 93.2 kg (205 lb 7.5 oz), SpO2 98 %.    Physical Exam   Constitutional: Pt is oriented to person, place, and time and well-developed, well-nourished, and in no distress.   Mouth/Throat: Oropharynx is clear and moist.   Neck: Normal range of motion.   Cardiovascular: Normal rate, regular rhythm and normal heart sounds.    Pulmonary/Chest: " Effort normal and breath sounds normal.   Abdominal: Soft. Nontender  Skin: Skin is warm and dry.   Psychiatric: Mood, memory, affect and judgment normal.     Assessment/Plan     Diagnosis:  Dark stools, abdominal pain, diarrhea, GI    Anticipated Surgical Procedure:  EGD colonoscopy    The risks, benefits, and alternatives of this procedure have been discussed with the patient or the responsible party- the patient understands and agrees to proceed.

## 2022-02-10 NOTE — ANESTHESIA PREPROCEDURE EVALUATION
Anesthesia Evaluation     Patient summary reviewed and Nursing notes reviewed   no history of anesthetic complications:  NPO Solid Status: > 8 hours  NPO Liquid Status: > 2 hours           Airway   Mallampati: I  TM distance: >3 FB  Neck ROM: full  No difficulty expected  Dental      Pulmonary - normal exam    breath sounds clear to auscultation  (+) sleep apnea,   Cardiovascular - normal exam  Exercise tolerance: good (4-7 METS)    Rhythm: regular  Rate: normal    (+) hypertension,       Neuro/Psych- negative ROS  GI/Hepatic/Renal/Endo    (+)  GERD, GI bleeding lower , diabetes mellitus,     Musculoskeletal (-) negative ROS    Abdominal    Substance History - negative use     OB/GYN negative ob/gyn ROS         Other - negative ROS                       Anesthesia Plan    ASA 3     general   (Lifting DNR for OR)    Anesthetic plan, all risks, benefits, and alternatives have been provided, discussed and informed consent has been obtained with: patient.        CODE STATUS:

## 2022-02-14 ENCOUNTER — APPOINTMENT (OUTPATIENT)
Dept: PREADMISSION TESTING | Facility: HOSPITAL | Age: 72
End: 2022-02-14

## 2022-02-14 DIAGNOSIS — R10.84 GENERALIZED ABDOMINAL PAIN: Primary | ICD-10-CM

## 2022-02-14 DIAGNOSIS — R63.4 WEIGHT LOSS: ICD-10-CM

## 2022-02-23 ENCOUNTER — HOSPITAL ENCOUNTER (OUTPATIENT)
Facility: HOSPITAL | Age: 72
Setting detail: OBSERVATION
Discharge: HOME | End: 2022-02-24
Attending: EMERGENCY MEDICINE | Admitting: INTERNAL MEDICINE

## 2022-02-23 ENCOUNTER — APPOINTMENT (OUTPATIENT)
Dept: GENERAL RADIOLOGY | Facility: HOSPITAL | Age: 72
End: 2022-02-23

## 2022-02-23 DIAGNOSIS — R07.9 CHEST PAIN, UNSPECIFIED TYPE: Primary | ICD-10-CM

## 2022-02-23 LAB
ALBUMIN SERPL-MCNC: 4.1 G/DL (ref 3.5–5.2)
ALBUMIN/GLOB SERPL: 1.3 G/DL
ALP SERPL-CCNC: 85 U/L (ref 39–117)
ALT SERPL W P-5'-P-CCNC: 41 U/L (ref 1–41)
ANION GAP SERPL CALCULATED.3IONS-SCNC: 14.6 MMOL/L (ref 5–15)
AST SERPL-CCNC: 23 U/L (ref 1–40)
BASOPHILS # BLD AUTO: 0.02 10*3/MM3 (ref 0–0.2)
BASOPHILS NFR BLD AUTO: 0.2 % (ref 0–1.5)
BILIRUB SERPL-MCNC: 0.4 MG/DL (ref 0–1.2)
BUN SERPL-MCNC: 17 MG/DL (ref 8–23)
BUN/CREAT SERPL: 21.5 (ref 7–25)
CALCIUM SPEC-SCNC: 9.9 MG/DL (ref 8.6–10.5)
CHLORIDE SERPL-SCNC: 105 MMOL/L (ref 98–107)
CK MB SERPL-CCNC: 1.17 NG/ML
CK SERPL-CCNC: 31 U/L (ref 20–200)
CO2 SERPL-SCNC: 20.4 MMOL/L (ref 22–29)
CREAT SERPL-MCNC: 0.79 MG/DL (ref 0.76–1.27)
DEPRECATED RDW RBC AUTO: 39.7 FL (ref 37–54)
EOSINOPHIL # BLD AUTO: 0.1 10*3/MM3 (ref 0–0.4)
EOSINOPHIL NFR BLD AUTO: 1 % (ref 0.3–6.2)
ERYTHROCYTE [DISTWIDTH] IN BLOOD BY AUTOMATED COUNT: 12.8 % (ref 12.3–15.4)
GFR SERPL CREATININE-BSD FRML MDRD: 96 ML/MIN/1.73
GLOBULIN UR ELPH-MCNC: 3.2 GM/DL
GLUCOSE SERPL-MCNC: 140 MG/DL (ref 65–99)
HCT VFR BLD AUTO: 39.9 % (ref 37.5–51)
HGB BLD-MCNC: 14.7 G/DL (ref 13–17.7)
HOLD SPECIMEN: NORMAL
HOLD SPECIMEN: NORMAL
IMM GRANULOCYTES # BLD AUTO: 0.03 10*3/MM3 (ref 0–0.05)
IMM GRANULOCYTES NFR BLD AUTO: 0.3 % (ref 0–0.5)
LIPASE SERPL-CCNC: 39 U/L (ref 13–60)
LYMPHOCYTES # BLD AUTO: 2.74 10*3/MM3 (ref 0.7–3.1)
LYMPHOCYTES NFR BLD AUTO: 26.6 % (ref 19.6–45.3)
MAGNESIUM SERPL-MCNC: 1.9 MG/DL (ref 1.6–2.4)
MCH RBC QN AUTO: 31.7 PG (ref 26.6–33)
MCHC RBC AUTO-ENTMCNC: 36.8 G/DL (ref 31.5–35.7)
MCV RBC AUTO: 86 FL (ref 79–97)
MONOCYTES # BLD AUTO: 0.77 10*3/MM3 (ref 0.1–0.9)
MONOCYTES NFR BLD AUTO: 7.5 % (ref 5–12)
NEUTROPHILS NFR BLD AUTO: 6.65 10*3/MM3 (ref 1.7–7)
NEUTROPHILS NFR BLD AUTO: 64.4 % (ref 42.7–76)
NRBC BLD AUTO-RTO: 0 /100 WBC (ref 0–0.2)
NT-PROBNP SERPL-MCNC: 17.3 PG/ML (ref 0–900)
PLATELET # BLD AUTO: 178 10*3/MM3 (ref 140–450)
PMV BLD AUTO: 9.5 FL (ref 6–12)
POTASSIUM SERPL-SCNC: 3.7 MMOL/L (ref 3.5–5.2)
PROT SERPL-MCNC: 7.3 G/DL (ref 6–8.5)
QT INTERVAL: 401 MS
QT INTERVAL: 461 MS
RBC # BLD AUTO: 4.64 10*6/MM3 (ref 4.14–5.8)
SODIUM SERPL-SCNC: 140 MMOL/L (ref 136–145)
TROPONIN I SERPL-MCNC: 0 NG/ML (ref 0–0.6)
TROPONIN I SERPL-MCNC: 0 NG/ML (ref 0–0.6)
TROPONIN T SERPL-MCNC: <0.01 NG/ML (ref 0–0.03)
TROPONIN T SERPL-MCNC: <0.01 NG/ML (ref 0–0.03)
WBC NRBC COR # BLD: 10.31 10*3/MM3 (ref 3.4–10.8)
WHOLE BLOOD HOLD SPECIMEN: NORMAL
WHOLE BLOOD HOLD SPECIMEN: NORMAL

## 2022-02-23 PROCEDURE — 82550 ASSAY OF CK (CPK): CPT | Performed by: EMERGENCY MEDICINE

## 2022-02-23 PROCEDURE — G0378 HOSPITAL OBSERVATION PER HR: HCPCS

## 2022-02-23 PROCEDURE — 93005 ELECTROCARDIOGRAM TRACING: CPT | Performed by: EMERGENCY MEDICINE

## 2022-02-23 PROCEDURE — 80053 COMPREHEN METABOLIC PANEL: CPT | Performed by: EMERGENCY MEDICINE

## 2022-02-23 PROCEDURE — 36415 COLL VENOUS BLD VENIPUNCTURE: CPT

## 2022-02-23 PROCEDURE — 83735 ASSAY OF MAGNESIUM: CPT | Performed by: EMERGENCY MEDICINE

## 2022-02-23 PROCEDURE — 93010 ELECTROCARDIOGRAM REPORT: CPT | Performed by: SPECIALIST

## 2022-02-23 PROCEDURE — 84484 ASSAY OF TROPONIN QUANT: CPT

## 2022-02-23 PROCEDURE — 83880 ASSAY OF NATRIURETIC PEPTIDE: CPT | Performed by: EMERGENCY MEDICINE

## 2022-02-23 PROCEDURE — 83690 ASSAY OF LIPASE: CPT | Performed by: EMERGENCY MEDICINE

## 2022-02-23 PROCEDURE — 93005 ELECTROCARDIOGRAM TRACING: CPT | Performed by: INTERNAL MEDICINE

## 2022-02-23 PROCEDURE — 71045 X-RAY EXAM CHEST 1 VIEW: CPT

## 2022-02-23 PROCEDURE — 82553 CREATINE MB FRACTION: CPT | Performed by: EMERGENCY MEDICINE

## 2022-02-23 PROCEDURE — 85025 COMPLETE CBC W/AUTO DIFF WBC: CPT

## 2022-02-23 PROCEDURE — 93005 ELECTROCARDIOGRAM TRACING: CPT

## 2022-02-23 PROCEDURE — 99284 EMERGENCY DEPT VISIT MOD MDM: CPT

## 2022-02-23 PROCEDURE — 93010 ELECTROCARDIOGRAM REPORT: CPT | Performed by: INTERNAL MEDICINE

## 2022-02-23 PROCEDURE — 84484 ASSAY OF TROPONIN QUANT: CPT | Performed by: INTERNAL MEDICINE

## 2022-02-23 RX ORDER — ASPIRIN 81 MG/1
324 TABLET, CHEWABLE ORAL ONCE
Status: COMPLETED | OUTPATIENT
Start: 2022-02-23 | End: 2022-02-23

## 2022-02-23 RX ORDER — AMOXICILLIN 250 MG
2 CAPSULE ORAL NIGHTLY
Status: DISCONTINUED | OUTPATIENT
Start: 2022-02-23 | End: 2022-02-24 | Stop reason: HOSPADM

## 2022-02-23 RX ORDER — LORAZEPAM 0.5 MG/1
0.5 TABLET ORAL EVERY 6 HOURS PRN
Status: DISCONTINUED | OUTPATIENT
Start: 2022-02-23 | End: 2022-02-24 | Stop reason: HOSPADM

## 2022-02-23 RX ORDER — NALOXONE HCL 0.4 MG/ML
0.4 VIAL (ML) INJECTION
Status: DISCONTINUED | OUTPATIENT
Start: 2022-02-23 | End: 2022-02-24 | Stop reason: HOSPADM

## 2022-02-23 RX ORDER — BISACODYL 5 MG/1
5 TABLET, DELAYED RELEASE ORAL DAILY PRN
Status: DISCONTINUED | OUTPATIENT
Start: 2022-02-23 | End: 2022-02-24 | Stop reason: HOSPADM

## 2022-02-23 RX ORDER — POLYETHYLENE GLYCOL 3350 17 G/17G
17 POWDER, FOR SOLUTION ORAL DAILY PRN
Status: DISCONTINUED | OUTPATIENT
Start: 2022-02-23 | End: 2022-02-24 | Stop reason: HOSPADM

## 2022-02-23 RX ORDER — ESCITALOPRAM OXALATE 10 MG/1
20 TABLET ORAL NIGHTLY
Status: DISCONTINUED | OUTPATIENT
Start: 2022-02-23 | End: 2022-02-24 | Stop reason: HOSPADM

## 2022-02-23 RX ORDER — ACETAMINOPHEN 325 MG/1
650 TABLET ORAL EVERY 6 HOURS PRN
Status: DISCONTINUED | OUTPATIENT
Start: 2022-02-23 | End: 2022-02-24 | Stop reason: HOSPADM

## 2022-02-23 RX ORDER — BISACODYL 10 MG
10 SUPPOSITORY, RECTAL RECTAL DAILY PRN
Status: DISCONTINUED | OUTPATIENT
Start: 2022-02-23 | End: 2022-02-24 | Stop reason: HOSPADM

## 2022-02-23 RX ORDER — BUPROPION HYDROCHLORIDE 150 MG/1
300 TABLET ORAL EVERY MORNING
Status: DISCONTINUED | OUTPATIENT
Start: 2022-02-24 | End: 2022-02-24 | Stop reason: HOSPADM

## 2022-02-23 RX ORDER — DILTIAZEM HYDROCHLORIDE 180 MG/1
180 CAPSULE, EXTENDED RELEASE ORAL NIGHTLY
Status: DISCONTINUED | OUTPATIENT
Start: 2022-02-23 | End: 2022-02-23

## 2022-02-23 RX ORDER — SODIUM CHLORIDE 0.9 % (FLUSH) 0.9 %
10 SYRINGE (ML) INJECTION AS NEEDED
Status: DISCONTINUED | OUTPATIENT
Start: 2022-02-23 | End: 2022-02-24 | Stop reason: HOSPADM

## 2022-02-23 RX ORDER — SODIUM CHLORIDE 0.9 % (FLUSH) 0.9 %
10 SYRINGE (ML) INJECTION EVERY 12 HOURS SCHEDULED
Status: DISCONTINUED | OUTPATIENT
Start: 2022-02-23 | End: 2022-02-24 | Stop reason: HOSPADM

## 2022-02-23 RX ORDER — KETOROLAC TROMETHAMINE 15 MG/ML
15 INJECTION, SOLUTION INTRAMUSCULAR; INTRAVENOUS ONCE
Status: DISCONTINUED | OUTPATIENT
Start: 2022-02-23 | End: 2022-02-24 | Stop reason: HOSPADM

## 2022-02-23 RX ORDER — ATORVASTATIN CALCIUM 20 MG/1
20 TABLET, FILM COATED ORAL NIGHTLY
Status: DISCONTINUED | OUTPATIENT
Start: 2022-02-23 | End: 2022-02-23 | Stop reason: SDUPTHER

## 2022-02-23 RX ORDER — ASPIRIN 81 MG/1
324 TABLET, CHEWABLE ORAL ONCE
Status: DISCONTINUED | OUTPATIENT
Start: 2022-02-23 | End: 2022-02-23 | Stop reason: SDUPTHER

## 2022-02-23 RX ORDER — ALUMINA, MAGNESIA, AND SIMETHICONE 2400; 2400; 240 MG/30ML; MG/30ML; MG/30ML
15 SUSPENSION ORAL EVERY 6 HOURS PRN
Status: DISCONTINUED | OUTPATIENT
Start: 2022-02-23 | End: 2022-02-24 | Stop reason: HOSPADM

## 2022-02-23 RX ORDER — CARBAMAZEPINE 200 MG/1
200 TABLET ORAL NIGHTLY
Status: DISCONTINUED | OUTPATIENT
Start: 2022-02-23 | End: 2022-02-24 | Stop reason: HOSPADM

## 2022-02-23 RX ORDER — ROSUVASTATIN CALCIUM 20 MG/1
20 TABLET, COATED ORAL NIGHTLY
Status: DISCONTINUED | OUTPATIENT
Start: 2022-02-23 | End: 2022-02-24 | Stop reason: HOSPADM

## 2022-02-23 RX ORDER — LISINOPRIL 10 MG/1
10 TABLET ORAL DAILY
Status: DISCONTINUED | OUTPATIENT
Start: 2022-02-24 | End: 2022-02-24 | Stop reason: HOSPADM

## 2022-02-23 RX ORDER — DILTIAZEM HYDROCHLORIDE 180 MG/1
180 CAPSULE, COATED, EXTENDED RELEASE ORAL NIGHTLY
Status: DISCONTINUED | OUTPATIENT
Start: 2022-02-23 | End: 2022-02-24 | Stop reason: HOSPADM

## 2022-02-23 RX ORDER — PANTOPRAZOLE SODIUM 40 MG/1
40 TABLET, DELAYED RELEASE ORAL DAILY
Status: DISCONTINUED | OUTPATIENT
Start: 2022-02-23 | End: 2022-02-24 | Stop reason: HOSPADM

## 2022-02-23 RX ORDER — MORPHINE SULFATE 2 MG/ML
1 INJECTION, SOLUTION INTRAMUSCULAR; INTRAVENOUS EVERY 4 HOURS PRN
Status: DISCONTINUED | OUTPATIENT
Start: 2022-02-23 | End: 2022-02-24 | Stop reason: HOSPADM

## 2022-02-23 RX ORDER — ASPIRIN 81 MG/1
81 TABLET ORAL DAILY
Status: DISCONTINUED | OUTPATIENT
Start: 2022-02-24 | End: 2022-02-24 | Stop reason: HOSPADM

## 2022-02-23 RX ORDER — ASPIRIN 81 MG/1
81 TABLET ORAL DAILY
Status: DISCONTINUED | OUTPATIENT
Start: 2022-02-24 | End: 2022-02-23 | Stop reason: SDUPTHER

## 2022-02-23 RX ADMIN — CARBAMAZEPINE 200 MG: 200 TABLET ORAL at 20:24

## 2022-02-23 RX ADMIN — DILTIAZEM HYDROCHLORIDE 180 MG: 180 CAPSULE, COATED, EXTENDED RELEASE ORAL at 20:24

## 2022-02-23 RX ADMIN — ASPIRIN 81 MG CHEWABLE TABLET 324 MG: 81 TABLET CHEWABLE at 13:45

## 2022-02-23 RX ADMIN — ROSUVASTATIN 20 MG: 20 TABLET, FILM COATED ORAL at 20:24

## 2022-02-23 RX ADMIN — NITROGLYCERIN 0.5 INCH: 20 OINTMENT TOPICAL at 13:45

## 2022-02-23 RX ADMIN — Medication 10 ML: at 20:24

## 2022-02-23 RX ADMIN — NITROGLYCERIN 0.5 INCH: 20 OINTMENT TOPICAL at 20:23

## 2022-02-23 RX ADMIN — PANTOPRAZOLE SODIUM 40 MG: 40 TABLET, DELAYED RELEASE ORAL at 20:23

## 2022-02-24 ENCOUNTER — APPOINTMENT (OUTPATIENT)
Dept: NUCLEAR MEDICINE | Facility: HOSPITAL | Age: 72
End: 2022-02-24

## 2022-02-24 VITALS
HEIGHT: 67 IN | OXYGEN SATURATION: 96 % | WEIGHT: 210.76 LBS | DIASTOLIC BLOOD PRESSURE: 80 MMHG | HEART RATE: 75 BPM | TEMPERATURE: 97.7 F | BODY MASS INDEX: 33.08 KG/M2 | RESPIRATION RATE: 18 BRPM | SYSTOLIC BLOOD PRESSURE: 135 MMHG

## 2022-02-24 PROBLEM — R07.9 CHEST PAIN: Status: RESOLVED | Noted: 2022-02-23 | Resolved: 2022-02-24

## 2022-02-24 LAB
ANION GAP SERPL CALCULATED.3IONS-SCNC: 10.8 MMOL/L (ref 5–15)
BH CV IMMEDIATE POST RECOVERY TECH DATA SYMPTOMS: NORMAL
BH CV IMMEDIATE POST TECH DATA BLOOD PRESSURE: NORMAL MMHG
BH CV IMMEDIATE POST TECH DATA HEART RATE: 86 BPM
BH CV IMMEDIATE POST TECH DATA OXYGEN SATS: 97 %
BH CV REST NUCLEAR ISOTOPE DOSE: 9 MCI
BH CV SIX MINUTE RECOVERY TECH DATA BLOOD PRESSURE: NORMAL
BH CV SIX MINUTE RECOVERY TECH DATA HEART RATE: 75 BPM
BH CV SIX MINUTE RECOVERY TECH DATA OXYGEN SATURATION: 97 %
BH CV SIX MINUTE RECOVERY TECH DATA SYMPTOMS: NORMAL
BH CV STRESS BP STAGE 1: NORMAL
BH CV STRESS COMMENTS STAGE 1: NORMAL
BH CV STRESS DOSE REGADENOSON STAGE 1: 0.4
BH CV STRESS DURATION MIN STAGE 1: 0
BH CV STRESS DURATION SEC STAGE 1: 10
BH CV STRESS HR STAGE 1: 56
BH CV STRESS NUCLEAR ISOTOPE DOSE: 34.4 MCI
BH CV STRESS O2 STAGE 1: 98
BH CV STRESS PROTOCOL 1: NORMAL
BH CV STRESS RECOVERY BP: NORMAL MMHG
BH CV STRESS RECOVERY HR: 75 BPM
BH CV STRESS RECOVERY O2: 96 %
BH CV STRESS STAGE 1: 1
BH CV THREE MINUTE POST TECH DATA BLOOD PRESSURE: NORMAL MMHG
BH CV THREE MINUTE POST TECH DATA HEART RATE: 81 BPM
BH CV THREE MINUTE POST TECH DATA OXYGEN SATURATION: 97 %
BUN SERPL-MCNC: 13 MG/DL (ref 8–23)
BUN/CREAT SERPL: 15.5 (ref 7–25)
CALCIUM SPEC-SCNC: 9.6 MG/DL (ref 8.6–10.5)
CHLORIDE SERPL-SCNC: 106 MMOL/L (ref 98–107)
CO2 SERPL-SCNC: 23.2 MMOL/L (ref 22–29)
CREAT SERPL-MCNC: 0.84 MG/DL (ref 0.76–1.27)
DEPRECATED RDW RBC AUTO: 41.5 FL (ref 37–54)
ERYTHROCYTE [DISTWIDTH] IN BLOOD BY AUTOMATED COUNT: 12.9 % (ref 12.3–15.4)
GFR SERPL CREATININE-BSD FRML MDRD: 90 ML/MIN/1.73
GLUCOSE BLDC GLUCOMTR-MCNC: 106 MG/DL (ref 70–99)
GLUCOSE SERPL-MCNC: 151 MG/DL (ref 65–99)
HCT VFR BLD AUTO: 40.8 % (ref 37.5–51)
HGB BLD-MCNC: 14.7 G/DL (ref 13–17.7)
LV EF NUC BP: 65 %
MAXIMAL PREDICTED HEART RATE: 148 BPM
MCH RBC QN AUTO: 31.7 PG (ref 26.6–33)
MCHC RBC AUTO-ENTMCNC: 36 G/DL (ref 31.5–35.7)
MCV RBC AUTO: 87.9 FL (ref 79–97)
PERCENT MAX PREDICTED HR: 58.11 %
PLATELET # BLD AUTO: 170 10*3/MM3 (ref 140–450)
PMV BLD AUTO: 9.7 FL (ref 6–12)
POTASSIUM SERPL-SCNC: 4.3 MMOL/L (ref 3.5–5.2)
QT INTERVAL: 435 MS
QT INTERVAL: 435 MS
QT INTERVAL: 450 MS
RBC # BLD AUTO: 4.64 10*6/MM3 (ref 4.14–5.8)
SODIUM SERPL-SCNC: 140 MMOL/L (ref 136–145)
STRESS BASELINE BP: NORMAL MMHG
STRESS BASELINE HR: 58 BPM
STRESS O2 SAT REST: 96 %
STRESS PERCENT HR: 68 %
STRESS POST O2 SAT PEAK: 98 %
STRESS POST PEAK BP: NORMAL MMHG
STRESS POST PEAK HR: 86 BPM
STRESS TARGET HR: 126 BPM
WBC NRBC COR # BLD: 10.64 10*3/MM3 (ref 3.4–10.8)

## 2022-02-24 PROCEDURE — 82962 GLUCOSE BLOOD TEST: CPT

## 2022-02-24 PROCEDURE — G0378 HOSPITAL OBSERVATION PER HR: HCPCS

## 2022-02-24 PROCEDURE — 25010000002 REGADENOSON 0.4 MG/5ML SOLUTION

## 2022-02-24 PROCEDURE — 99204 OFFICE O/P NEW MOD 45 MIN: CPT | Performed by: SPECIALIST

## 2022-02-24 PROCEDURE — 93005 ELECTROCARDIOGRAM TRACING: CPT | Performed by: INTERNAL MEDICINE

## 2022-02-24 PROCEDURE — 93018 CV STRESS TEST I&R ONLY: CPT | Performed by: SPECIALIST

## 2022-02-24 PROCEDURE — A9502 TC99M TETROFOSMIN: HCPCS | Performed by: INTERNAL MEDICINE

## 2022-02-24 PROCEDURE — 80048 BASIC METABOLIC PNL TOTAL CA: CPT | Performed by: INTERNAL MEDICINE

## 2022-02-24 PROCEDURE — 93010 ELECTROCARDIOGRAM REPORT: CPT | Performed by: INTERNAL MEDICINE

## 2022-02-24 PROCEDURE — 93017 CV STRESS TEST TRACING ONLY: CPT

## 2022-02-24 PROCEDURE — 93016 CV STRESS TEST SUPVJ ONLY: CPT | Performed by: NURSE PRACTITIONER

## 2022-02-24 PROCEDURE — 85027 COMPLETE CBC AUTOMATED: CPT | Performed by: INTERNAL MEDICINE

## 2022-02-24 PROCEDURE — 78452 HT MUSCLE IMAGE SPECT MULT: CPT | Performed by: SPECIALIST

## 2022-02-24 PROCEDURE — 78452 HT MUSCLE IMAGE SPECT MULT: CPT

## 2022-02-24 PROCEDURE — 0 TECHNETIUM TETROFOSMIN KIT: Performed by: INTERNAL MEDICINE

## 2022-02-24 RX ORDER — HYOSCYAMINE SULFATE 0.12 MG/1
0.12 TABLET SUBLINGUAL EVERY 4 HOURS PRN
Qty: 40 EACH | Refills: 0 | Status: SHIPPED | OUTPATIENT
Start: 2022-02-24 | End: 2022-03-06

## 2022-02-24 RX ADMIN — TETROFOSMIN 1 DOSE: 1.38 INJECTION, POWDER, LYOPHILIZED, FOR SOLUTION INTRAVENOUS at 10:34

## 2022-02-24 RX ADMIN — ASPIRIN 81 MG: 81 TABLET, COATED ORAL at 12:35

## 2022-02-24 RX ADMIN — LISINOPRIL 10 MG: 10 TABLET ORAL at 12:35

## 2022-02-24 RX ADMIN — NITROGLYCERIN 0.5 INCH: 20 OINTMENT TOPICAL at 12:35

## 2022-02-24 RX ADMIN — TETROFOSMIN 1 DOSE: 1.38 INJECTION, POWDER, LYOPHILIZED, FOR SOLUTION INTRAVENOUS at 09:12

## 2022-02-24 RX ADMIN — REGADENOSON 0.4 MG: 0.08 INJECTION, SOLUTION INTRAVENOUS at 10:34

## 2022-02-24 RX ADMIN — ESCITALOPRAM OXALATE 10 MG: 10 TABLET ORAL at 00:45

## 2022-02-24 RX ADMIN — PANTOPRAZOLE SODIUM 40 MG: 40 TABLET, DELAYED RELEASE ORAL at 12:35

## 2022-02-24 RX ADMIN — BUPROPION HYDROCHLORIDE 300 MG: 150 TABLET, EXTENDED RELEASE ORAL at 12:40

## 2022-02-25 ENCOUNTER — READMISSION MANAGEMENT (OUTPATIENT)
Dept: CALL CENTER | Facility: HOSPITAL | Age: 72
End: 2022-02-25

## 2022-02-25 NOTE — OUTREACH NOTE
Prep Survey      Responses   [facility] patient discharged from? [facility]   Is LACE score < 7 ? Yes   Emergency Room discharge w/ pulse ox? No   Eligibility Not Eligible   What are the reasons patient is not eligible? Other   Does the patient have one of the following disease processes/diagnoses(primary or secondary)? Other   Prep survey completed? Yes          Mary Khan RN

## 2022-03-03 ENCOUNTER — HOSPITAL ENCOUNTER (OUTPATIENT)
Dept: MRI IMAGING | Facility: HOSPITAL | Age: 72
Discharge: HOME | End: 2022-03-03
Admitting: NURSE PRACTITIONER

## 2022-03-03 DIAGNOSIS — R63.4 WEIGHT LOSS: ICD-10-CM

## 2022-03-03 DIAGNOSIS — R10.84 GENERALIZED ABDOMINAL PAIN: ICD-10-CM

## 2022-03-03 PROCEDURE — 74183 MRI ABD W/O CNTR FLWD CNTR: CPT

## 2022-03-03 PROCEDURE — A9577 INJ MULTIHANCE: HCPCS | Performed by: NURSE PRACTITIONER

## 2022-03-03 PROCEDURE — 0 GADOBENATE DIMEGLUMINE 529 MG/ML SOLUTION: Performed by: NURSE PRACTITIONER

## 2022-03-03 RX ADMIN — GADOBENATE DIMEGLUMINE 20 ML: 529 INJECTION, SOLUTION INTRAVENOUS at 12:54

## 2022-03-08 NOTE — PROGRESS NOTES
Please notify pt MRI/MRCP is normal, please check on pt's sx how is he feeling and is he still having wt loss?

## 2022-05-05 ENCOUNTER — PREP FOR SURGERY (OUTPATIENT)
Dept: OTHER | Facility: HOSPITAL | Age: 72
End: 2022-05-05

## 2022-05-05 ENCOUNTER — OFFICE VISIT (OUTPATIENT)
Dept: ORTHOPEDIC SURGERY | Facility: CLINIC | Age: 72
End: 2022-05-05

## 2022-05-05 VITALS — WEIGHT: 214 LBS | HEIGHT: 67 IN | HEART RATE: 51 BPM | BODY MASS INDEX: 33.59 KG/M2 | OXYGEN SATURATION: 98 %

## 2022-05-05 DIAGNOSIS — M17.11 PRIMARY OSTEOARTHRITIS OF RIGHT KNEE: Primary | ICD-10-CM

## 2022-05-05 PROCEDURE — 99214 OFFICE O/P EST MOD 30 MIN: CPT | Performed by: ORTHOPAEDIC SURGERY

## 2022-05-06 DIAGNOSIS — M17.11 PRIMARY OSTEOARTHRITIS OF RIGHT KNEE: Primary | ICD-10-CM

## 2022-05-23 DIAGNOSIS — Z47.1 AFTERCARE FOLLOWING RIGHT KNEE JOINT REPLACEMENT SURGERY: Primary | ICD-10-CM

## 2022-05-23 DIAGNOSIS — Z96.651 AFTERCARE FOLLOWING RIGHT KNEE JOINT REPLACEMENT SURGERY: Primary | ICD-10-CM

## 2022-05-27 DIAGNOSIS — Z01.818 PREOPERATIVE TESTING: Primary | ICD-10-CM

## 2022-05-31 ENCOUNTER — ANESTHESIA EVENT (OUTPATIENT)
Dept: PERIOP | Facility: HOSPITAL | Age: 72
End: 2022-05-31

## 2022-05-31 ENCOUNTER — PRE-ADMISSION TESTING (OUTPATIENT)
Dept: PREADMISSION TESTING | Facility: HOSPITAL | Age: 72
End: 2022-05-31

## 2022-05-31 VITALS
HEIGHT: 67 IN | WEIGHT: 213.19 LBS | RESPIRATION RATE: 18 BRPM | TEMPERATURE: 97.5 F | HEART RATE: 55 BPM | SYSTOLIC BLOOD PRESSURE: 142 MMHG | DIASTOLIC BLOOD PRESSURE: 80 MMHG | BODY MASS INDEX: 33.46 KG/M2 | OXYGEN SATURATION: 97 %

## 2022-05-31 DIAGNOSIS — Z01.818 PREOPERATIVE TESTING: ICD-10-CM

## 2022-05-31 LAB
ALBUMIN SERPL-MCNC: 4.1 G/DL (ref 3.5–5.2)
ALBUMIN/GLOB SERPL: 1.6 G/DL
ALP SERPL-CCNC: 102 U/L (ref 39–117)
ALT SERPL W P-5'-P-CCNC: 19 U/L (ref 1–41)
ANION GAP SERPL CALCULATED.3IONS-SCNC: 11.3 MMOL/L (ref 5–15)
AST SERPL-CCNC: 16 U/L (ref 1–40)
BASOPHILS # BLD AUTO: 0.03 10*3/MM3 (ref 0–0.2)
BASOPHILS NFR BLD AUTO: 0.4 % (ref 0–1.5)
BILIRUB SERPL-MCNC: 0.3 MG/DL (ref 0–1.2)
BUN SERPL-MCNC: 13 MG/DL (ref 8–23)
BUN/CREAT SERPL: 15.7 (ref 7–25)
CALCIUM SPEC-SCNC: 9.2 MG/DL (ref 8.6–10.5)
CHLORIDE SERPL-SCNC: 106 MMOL/L (ref 98–107)
CO2 SERPL-SCNC: 25.7 MMOL/L (ref 22–29)
CREAT SERPL-MCNC: 0.83 MG/DL (ref 0.76–1.27)
DEPRECATED RDW RBC AUTO: 42.7 FL (ref 37–54)
EGFRCR SERPLBLD CKD-EPI 2021: 93 ML/MIN/1.73
EOSINOPHIL # BLD AUTO: 0.07 10*3/MM3 (ref 0–0.4)
EOSINOPHIL NFR BLD AUTO: 0.9 % (ref 0.3–6.2)
ERYTHROCYTE [DISTWIDTH] IN BLOOD BY AUTOMATED COUNT: 13 % (ref 12.3–15.4)
GLOBULIN UR ELPH-MCNC: 2.6 GM/DL
GLUCOSE SERPL-MCNC: 114 MG/DL (ref 65–99)
HBA1C MFR BLD: 5.8 % (ref 4.8–5.6)
HCT VFR BLD AUTO: 40.9 % (ref 37.5–51)
HGB BLD-MCNC: 13.9 G/DL (ref 13–17.7)
IMM GRANULOCYTES # BLD AUTO: 0.02 10*3/MM3 (ref 0–0.05)
IMM GRANULOCYTES NFR BLD AUTO: 0.3 % (ref 0–0.5)
INR PPP: 1.06 (ref 0.86–1.15)
LYMPHOCYTES # BLD AUTO: 1.87 10*3/MM3 (ref 0.7–3.1)
LYMPHOCYTES NFR BLD AUTO: 24.3 % (ref 19.6–45.3)
MCH RBC QN AUTO: 31 PG (ref 26.6–33)
MCHC RBC AUTO-ENTMCNC: 34 G/DL (ref 31.5–35.7)
MCV RBC AUTO: 91.3 FL (ref 79–97)
MONOCYTES # BLD AUTO: 0.65 10*3/MM3 (ref 0.1–0.9)
MONOCYTES NFR BLD AUTO: 8.5 % (ref 5–12)
NEUTROPHILS NFR BLD AUTO: 5.05 10*3/MM3 (ref 1.7–7)
NEUTROPHILS NFR BLD AUTO: 65.6 % (ref 42.7–76)
NRBC BLD AUTO-RTO: 0 /100 WBC (ref 0–0.2)
PLATELET # BLD AUTO: 166 10*3/MM3 (ref 140–450)
PMV BLD AUTO: 9.5 FL (ref 6–12)
POTASSIUM SERPL-SCNC: 3.9 MMOL/L (ref 3.5–5.2)
PROT SERPL-MCNC: 6.7 G/DL (ref 6–8.5)
PROTHROMBIN TIME: 13.9 SECONDS (ref 11.8–14.9)
RBC # BLD AUTO: 4.48 10*6/MM3 (ref 4.14–5.8)
SODIUM SERPL-SCNC: 143 MMOL/L (ref 136–145)
WBC NRBC COR # BLD: 7.69 10*3/MM3 (ref 3.4–10.8)

## 2022-05-31 PROCEDURE — 85025 COMPLETE CBC W/AUTO DIFF WBC: CPT

## 2022-05-31 PROCEDURE — 85610 PROTHROMBIN TIME: CPT

## 2022-05-31 PROCEDURE — 80053 COMPREHEN METABOLIC PANEL: CPT

## 2022-05-31 PROCEDURE — 83036 HEMOGLOBIN GLYCOSYLATED A1C: CPT

## 2022-05-31 PROCEDURE — 36415 COLL VENOUS BLD VENIPUNCTURE: CPT

## 2022-05-31 RX ORDER — BUPROPION HYDROCHLORIDE 150 MG/1
75 TABLET, EXTENDED RELEASE ORAL DAILY
COMMUNITY

## 2022-05-31 RX ORDER — DILTIAZEM HYDROCHLORIDE 180 MG/1
180 CAPSULE, COATED, EXTENDED RELEASE ORAL NIGHTLY
COMMUNITY

## 2022-05-31 RX ORDER — DICYCLOMINE HYDROCHLORIDE 10 MG/1
10 CAPSULE ORAL EVERY 6 HOURS PRN
COMMUNITY

## 2022-05-31 RX ORDER — ROSUVASTATIN CALCIUM 40 MG/1
40 TABLET, COATED ORAL NIGHTLY
COMMUNITY

## 2022-05-31 ASSESSMENT — KOOS JR
KOOS JR SCORE: 47.487
KOOS JR SCORE: 16

## 2022-06-01 ENCOUNTER — HOSPITAL ENCOUNTER (OUTPATIENT)
Facility: HOSPITAL | Age: 72
Discharge: HOME HEALTH | End: 2022-06-02
Attending: ORTHOPAEDIC SURGERY | Admitting: ORTHOPAEDIC SURGERY

## 2022-06-01 ENCOUNTER — APPOINTMENT (OUTPATIENT)
Dept: GENERAL RADIOLOGY | Facility: HOSPITAL | Age: 72
End: 2022-06-01

## 2022-06-01 ENCOUNTER — ANESTHESIA (OUTPATIENT)
Dept: PERIOP | Facility: HOSPITAL | Age: 72
End: 2022-06-01

## 2022-06-01 DIAGNOSIS — Z78.9 DECREASED ACTIVITIES OF DAILY LIVING (ADL): ICD-10-CM

## 2022-06-01 DIAGNOSIS — M17.11 PRIMARY OSTEOARTHRITIS OF RIGHT KNEE: ICD-10-CM

## 2022-06-01 DIAGNOSIS — M17.11 OSTEOARTHRITIS OF RIGHT KNEE, UNSPECIFIED OSTEOARTHRITIS TYPE: ICD-10-CM

## 2022-06-01 DIAGNOSIS — R26.2 DIFFICULTY IN WALKING: Primary | ICD-10-CM

## 2022-06-01 PROBLEM — R45.851 VERBALIZES SUICIDAL THOUGHTS: Status: ACTIVE | Noted: 2022-06-01

## 2022-06-01 LAB — GLUCOSE BLDC GLUCOMTR-MCNC: 100 MG/DL (ref 70–99)

## 2022-06-01 PROCEDURE — 25010000002 KETOROLAC TROMETHAMINE PER 15 MG: Performed by: ORTHOPAEDIC SURGERY

## 2022-06-01 PROCEDURE — 25010000002 ONDANSETRON PER 1 MG: Performed by: NURSE ANESTHETIST, CERTIFIED REGISTERED

## 2022-06-01 PROCEDURE — 76942 ECHO GUIDE FOR BIOPSY: CPT | Performed by: ORTHOPAEDIC SURGERY

## 2022-06-01 PROCEDURE — 97161 PT EVAL LOW COMPLEX 20 MIN: CPT

## 2022-06-01 PROCEDURE — A9270 NON-COVERED ITEM OR SERVICE: HCPCS | Performed by: ANESTHESIOLOGY

## 2022-06-01 PROCEDURE — C1776 JOINT DEVICE (IMPLANTABLE): HCPCS | Performed by: ORTHOPAEDIC SURGERY

## 2022-06-01 PROCEDURE — 25010000002 ROPIVACAINE PER 1 MG: Performed by: ORTHOPAEDIC SURGERY

## 2022-06-01 PROCEDURE — 27447 TOTAL KNEE ARTHROPLASTY: CPT | Performed by: ORTHOPAEDIC SURGERY

## 2022-06-01 PROCEDURE — 63710000001 ACETAMINOPHEN 500 MG TABLET: Performed by: ORTHOPAEDIC SURGERY

## 2022-06-01 PROCEDURE — 25010000002 MIDAZOLAM PER 1 MG: Performed by: ANESTHESIOLOGY

## 2022-06-01 PROCEDURE — 25010000002 MORPHINE (PF) 10 MG/ML SOLUTION: Performed by: ORTHOPAEDIC SURGERY

## 2022-06-01 PROCEDURE — 94799 UNLISTED PULMONARY SVC/PX: CPT

## 2022-06-01 PROCEDURE — 82962 GLUCOSE BLOOD TEST: CPT

## 2022-06-01 PROCEDURE — 63710000001 CELECOXIB 100 MG CAPSULE: Performed by: ANESTHESIOLOGY

## 2022-06-01 PROCEDURE — 73560 X-RAY EXAM OF KNEE 1 OR 2: CPT

## 2022-06-01 PROCEDURE — A9270 NON-COVERED ITEM OR SERVICE: HCPCS | Performed by: ORTHOPAEDIC SURGERY

## 2022-06-01 PROCEDURE — 63710000001 SCOPOLAMINE 1 MG/3DAYS PATCH 72 HOUR: Performed by: ANESTHESIOLOGY

## 2022-06-01 PROCEDURE — 94761 N-INVAS EAR/PLS OXIMETRY MLT: CPT

## 2022-06-01 PROCEDURE — 25010000002 HYDROMORPHONE 1 MG/ML SOLUTION: Performed by: NURSE ANESTHETIST, CERTIFIED REGISTERED

## 2022-06-01 PROCEDURE — 25010000002 ROPIVACAINE PER 1 MG: Performed by: STUDENT IN AN ORGANIZED HEALTH CARE EDUCATION/TRAINING PROGRAM

## 2022-06-01 PROCEDURE — C1713 ANCHOR/SCREW BN/BN,TIS/BN: HCPCS | Performed by: ORTHOPAEDIC SURGERY

## 2022-06-01 PROCEDURE — 25010000002 DEXAMETHASONE PER 1 MG: Performed by: NURSE ANESTHETIST, CERTIFIED REGISTERED

## 2022-06-01 PROCEDURE — 25010000002 FENTANYL CITRATE (PF) 50 MCG/ML SOLUTION: Performed by: NURSE ANESTHETIST, CERTIFIED REGISTERED

## 2022-06-01 PROCEDURE — 25010000002 PROPOFOL 10 MG/ML EMULSION: Performed by: NURSE ANESTHETIST, CERTIFIED REGISTERED

## 2022-06-01 PROCEDURE — 63710000001 HYDROCODONE-ACETAMINOPHEN 7.5-325 MG TABLET: Performed by: ORTHOPAEDIC SURGERY

## 2022-06-01 PROCEDURE — 63710000001 ACETAMINOPHEN 500 MG TABLET: Performed by: ANESTHESIOLOGY

## 2022-06-01 PROCEDURE — 25010000002 EPINEPHRINE 1 MG/ML SOLUTION: Performed by: ORTHOPAEDIC SURGERY

## 2022-06-01 DEVICE — PAT KN PERSONA VE CRS/LNK CMT 9X35MM: Type: IMPLANTABLE DEVICE | Site: KNEE | Status: FUNCTIONAL

## 2022-06-01 DEVICE — ART/SRF KN PERSONA/VE MC EF 8TO11 10MM RT: Type: IMPLANTABLE DEVICE | Site: KNEE | Status: FUNCTIONAL

## 2022-06-01 DEVICE — CMT BONE PALACOS R HI/VISC 1X40: Type: IMPLANTABLE DEVICE | Site: KNEE | Status: FUNCTIONAL

## 2022-06-01 DEVICE — CAP TOTL KN CMT PRIMARY: Type: IMPLANTABLE DEVICE | Site: KNEE | Status: FUNCTIONAL

## 2022-06-01 DEVICE — STEM TIB/KN PERSONA CMT 5D SZE RT: Type: IMPLANTABLE DEVICE | Site: KNEE | Status: FUNCTIONAL

## 2022-06-01 DEVICE — COMP FEM/KN PERSONA CR CMT COCR STD SZ8 RT: Type: IMPLANTABLE DEVICE | Site: KNEE | Status: FUNCTIONAL

## 2022-06-01 RX ORDER — HYDROCODONE BITARTRATE AND ACETAMINOPHEN 7.5; 325 MG/1; MG/1
2 TABLET ORAL EVERY 4 HOURS PRN
Status: DISCONTINUED | OUTPATIENT
Start: 2022-06-01 | End: 2022-06-02 | Stop reason: HOSPADM

## 2022-06-01 RX ORDER — NALOXONE HCL 0.4 MG/ML
0.4 VIAL (ML) INJECTION
Status: DISCONTINUED | OUTPATIENT
Start: 2022-06-01 | End: 2022-06-02 | Stop reason: HOSPADM

## 2022-06-01 RX ORDER — LIDOCAINE HYDROCHLORIDE 20 MG/ML
INJECTION, SOLUTION EPIDURAL; INFILTRATION; INTRACAUDAL; PERINEURAL AS NEEDED
Status: DISCONTINUED | OUTPATIENT
Start: 2022-06-01 | End: 2022-06-01 | Stop reason: SURG

## 2022-06-01 RX ORDER — ROPIVACAINE HYDROCHLORIDE 5 MG/ML
INJECTION, SOLUTION EPIDURAL; INFILTRATION; PERINEURAL
Status: COMPLETED | OUTPATIENT
Start: 2022-06-01 | End: 2022-06-01

## 2022-06-01 RX ORDER — ROCURONIUM BROMIDE 10 MG/ML
INJECTION, SOLUTION INTRAVENOUS AS NEEDED
Status: DISCONTINUED | OUTPATIENT
Start: 2022-06-01 | End: 2022-06-01 | Stop reason: SURG

## 2022-06-01 RX ORDER — FENTANYL CITRATE 50 UG/ML
INJECTION, SOLUTION INTRAMUSCULAR; INTRAVENOUS AS NEEDED
Status: DISCONTINUED | OUTPATIENT
Start: 2022-06-01 | End: 2022-06-01 | Stop reason: SURG

## 2022-06-01 RX ORDER — CLINDAMYCIN PHOSPHATE 900 MG/50ML
900 INJECTION INTRAVENOUS EVERY 8 HOURS
Status: COMPLETED | OUTPATIENT
Start: 2022-06-01 | End: 2022-06-02

## 2022-06-01 RX ORDER — ONDANSETRON 2 MG/ML
4 INJECTION INTRAMUSCULAR; INTRAVENOUS EVERY 6 HOURS PRN
Status: DISCONTINUED | OUTPATIENT
Start: 2022-06-01 | End: 2022-06-02 | Stop reason: HOSPADM

## 2022-06-01 RX ORDER — PROMETHAZINE HYDROCHLORIDE 12.5 MG/1
25 TABLET ORAL ONCE AS NEEDED
Status: DISCONTINUED | OUTPATIENT
Start: 2022-06-01 | End: 2022-06-01 | Stop reason: HOSPADM

## 2022-06-01 RX ORDER — GLYCOPYRROLATE 0.2 MG/ML
INJECTION INTRAMUSCULAR; INTRAVENOUS AS NEEDED
Status: DISCONTINUED | OUTPATIENT
Start: 2022-06-01 | End: 2022-06-01 | Stop reason: SURG

## 2022-06-01 RX ORDER — MAGNESIUM HYDROXIDE 1200 MG/15ML
LIQUID ORAL AS NEEDED
Status: DISCONTINUED | OUTPATIENT
Start: 2022-06-01 | End: 2022-06-01 | Stop reason: HOSPADM

## 2022-06-01 RX ORDER — PROMETHAZINE HYDROCHLORIDE 25 MG/1
25 SUPPOSITORY RECTAL ONCE AS NEEDED
Status: DISCONTINUED | OUTPATIENT
Start: 2022-06-01 | End: 2022-06-01 | Stop reason: HOSPADM

## 2022-06-01 RX ORDER — DEXAMETHASONE SODIUM PHOSPHATE 4 MG/ML
INJECTION, SOLUTION INTRA-ARTICULAR; INTRALESIONAL; INTRAMUSCULAR; INTRAVENOUS; SOFT TISSUE AS NEEDED
Status: DISCONTINUED | OUTPATIENT
Start: 2022-06-01 | End: 2022-06-01 | Stop reason: SURG

## 2022-06-01 RX ORDER — SODIUM CHLORIDE 0.9 % (FLUSH) 0.9 %
10 SYRINGE (ML) INJECTION AS NEEDED
Status: DISCONTINUED | OUTPATIENT
Start: 2022-06-01 | End: 2022-06-01 | Stop reason: HOSPADM

## 2022-06-01 RX ORDER — GLYCOPYRROLATE 0.2 MG/ML
0.2 INJECTION INTRAMUSCULAR; INTRAVENOUS
Status: COMPLETED | OUTPATIENT
Start: 2022-06-01 | End: 2022-06-01

## 2022-06-01 RX ORDER — CLINDAMYCIN PHOSPHATE 900 MG/50ML
900 INJECTION INTRAVENOUS ONCE
Status: COMPLETED | OUTPATIENT
Start: 2022-06-01 | End: 2022-06-01

## 2022-06-01 RX ORDER — MIDAZOLAM HYDROCHLORIDE 1 MG/ML
2 INJECTION INTRAMUSCULAR; INTRAVENOUS ONCE
Status: COMPLETED | OUTPATIENT
Start: 2022-06-01 | End: 2022-06-01

## 2022-06-01 RX ORDER — TRANEXAMIC ACID 10 MG/ML
1000 INJECTION, SOLUTION INTRAVENOUS ONCE
Status: COMPLETED | OUTPATIENT
Start: 2022-06-01 | End: 2022-06-01

## 2022-06-01 RX ORDER — PROPOFOL 10 MG/ML
VIAL (ML) INTRAVENOUS AS NEEDED
Status: DISCONTINUED | OUTPATIENT
Start: 2022-06-01 | End: 2022-06-01 | Stop reason: SURG

## 2022-06-01 RX ORDER — MORPHINE SULFATE 2 MG/ML
6 INJECTION, SOLUTION INTRAMUSCULAR; INTRAVENOUS
Status: DISCONTINUED | OUTPATIENT
Start: 2022-06-01 | End: 2022-06-02 | Stop reason: HOSPADM

## 2022-06-01 RX ORDER — HYDROCODONE BITARTRATE AND ACETAMINOPHEN 7.5; 325 MG/1; MG/1
1 TABLET ORAL EVERY 4 HOURS PRN
Status: DISCONTINUED | OUTPATIENT
Start: 2022-06-01 | End: 2022-06-02 | Stop reason: HOSPADM

## 2022-06-01 RX ORDER — SODIUM CHLORIDE 9 MG/ML
100 INJECTION, SOLUTION INTRAVENOUS CONTINUOUS
Status: DISCONTINUED | OUTPATIENT
Start: 2022-06-01 | End: 2022-06-02 | Stop reason: HOSPADM

## 2022-06-01 RX ORDER — SCOLOPAMINE TRANSDERMAL SYSTEM 1 MG/1
1 PATCH, EXTENDED RELEASE TRANSDERMAL
Status: DISCONTINUED | OUTPATIENT
Start: 2022-06-01 | End: 2022-06-02 | Stop reason: HOSPADM

## 2022-06-01 RX ORDER — SODIUM CHLORIDE, SODIUM LACTATE, POTASSIUM CHLORIDE, CALCIUM CHLORIDE 600; 310; 30; 20 MG/100ML; MG/100ML; MG/100ML; MG/100ML
9 INJECTION, SOLUTION INTRAVENOUS CONTINUOUS PRN
Status: DISCONTINUED | OUTPATIENT
Start: 2022-06-01 | End: 2022-06-02 | Stop reason: HOSPADM

## 2022-06-01 RX ORDER — ONDANSETRON 2 MG/ML
4 INJECTION INTRAMUSCULAR; INTRAVENOUS ONCE AS NEEDED
Status: DISCONTINUED | OUTPATIENT
Start: 2022-06-01 | End: 2022-06-01 | Stop reason: HOSPADM

## 2022-06-01 RX ORDER — OXYCODONE HYDROCHLORIDE 5 MG/1
5 TABLET ORAL
Status: DISCONTINUED | OUTPATIENT
Start: 2022-06-01 | End: 2022-06-01 | Stop reason: HOSPADM

## 2022-06-01 RX ORDER — ACETAMINOPHEN 500 MG
1000 TABLET ORAL ONCE
Status: COMPLETED | OUTPATIENT
Start: 2022-06-01 | End: 2022-06-01

## 2022-06-01 RX ORDER — ONDANSETRON 2 MG/ML
INJECTION INTRAMUSCULAR; INTRAVENOUS AS NEEDED
Status: DISCONTINUED | OUTPATIENT
Start: 2022-06-01 | End: 2022-06-01 | Stop reason: SURG

## 2022-06-01 RX ORDER — CELECOXIB 100 MG/1
200 CAPSULE ORAL ONCE
Status: COMPLETED | OUTPATIENT
Start: 2022-06-01 | End: 2022-06-01

## 2022-06-01 RX ORDER — KETOROLAC TROMETHAMINE 15 MG/ML
15 INJECTION, SOLUTION INTRAMUSCULAR; INTRAVENOUS EVERY 6 HOURS
Status: COMPLETED | OUTPATIENT
Start: 2022-06-01 | End: 2022-06-02

## 2022-06-01 RX ORDER — MEPERIDINE HYDROCHLORIDE 25 MG/ML
12.5 INJECTION INTRAMUSCULAR; INTRAVENOUS; SUBCUTANEOUS
Status: DISCONTINUED | OUTPATIENT
Start: 2022-06-01 | End: 2022-06-01 | Stop reason: HOSPADM

## 2022-06-01 RX ORDER — EPHEDRINE SULFATE 50 MG/ML
INJECTION, SOLUTION INTRAVENOUS AS NEEDED
Status: DISCONTINUED | OUTPATIENT
Start: 2022-06-01 | End: 2022-06-01 | Stop reason: SURG

## 2022-06-01 RX ORDER — ONDANSETRON 4 MG/1
4 TABLET, FILM COATED ORAL EVERY 6 HOURS PRN
Status: DISCONTINUED | OUTPATIENT
Start: 2022-06-01 | End: 2022-06-02 | Stop reason: HOSPADM

## 2022-06-01 RX ORDER — ACETAMINOPHEN 500 MG
1000 TABLET ORAL EVERY 8 HOURS
Status: DISCONTINUED | OUTPATIENT
Start: 2022-06-01 | End: 2022-06-02 | Stop reason: HOSPADM

## 2022-06-01 RX ADMIN — ONDANSETRON 4 MG: 2 INJECTION INTRAMUSCULAR; INTRAVENOUS at 08:47

## 2022-06-01 RX ADMIN — FENTANYL CITRATE 50 MCG: 50 INJECTION, SOLUTION INTRAMUSCULAR; INTRAVENOUS at 08:36

## 2022-06-01 RX ADMIN — PROPOFOL 110 MG: 10 INJECTION, EMULSION INTRAVENOUS at 08:40

## 2022-06-01 RX ADMIN — MIDAZOLAM HYDROCHLORIDE 2 MG: 1 INJECTION, SOLUTION INTRAMUSCULAR; INTRAVENOUS at 07:17

## 2022-06-01 RX ADMIN — ACETAMINOPHEN 1000 MG: 500 TABLET ORAL at 22:22

## 2022-06-01 RX ADMIN — EPHEDRINE SULFATE 10 MG: 50 INJECTION INTRAVENOUS at 08:39

## 2022-06-01 RX ADMIN — KETOROLAC TROMETHAMINE 15 MG: 15 INJECTION, SOLUTION INTRAMUSCULAR; INTRAVENOUS at 13:28

## 2022-06-01 RX ADMIN — TRANEXAMIC ACID 1000 MG: 10 INJECTION, SOLUTION INTRAVENOUS at 07:17

## 2022-06-01 RX ADMIN — KETOROLAC TROMETHAMINE 15 MG: 15 INJECTION, SOLUTION INTRAMUSCULAR; INTRAVENOUS at 17:54

## 2022-06-01 RX ADMIN — HYDROCODONE BITARTRATE AND ACETAMINOPHEN 2 TABLET: 7.5; 325 TABLET ORAL at 13:45

## 2022-06-01 RX ADMIN — CLINDAMYCIN IN 5 PERCENT DEXTROSE 900 MG: 18 INJECTION, SOLUTION INTRAVENOUS at 17:12

## 2022-06-01 RX ADMIN — EPHEDRINE SULFATE 10 MG: 50 INJECTION INTRAVENOUS at 08:55

## 2022-06-01 RX ADMIN — ACETAMINOPHEN 1000 MG: 500 TABLET ORAL at 07:16

## 2022-06-01 RX ADMIN — EPHEDRINE SULFATE 10 MG: 50 INJECTION INTRAVENOUS at 09:00

## 2022-06-01 RX ADMIN — SODIUM CHLORIDE 100 ML/HR: 9 INJECTION, SOLUTION INTRAVENOUS at 22:49

## 2022-06-01 RX ADMIN — SODIUM CHLORIDE, POTASSIUM CHLORIDE, SODIUM LACTATE AND CALCIUM CHLORIDE 9 ML/HR: 600; 310; 30; 20 INJECTION, SOLUTION INTRAVENOUS at 07:17

## 2022-06-01 RX ADMIN — SODIUM CHLORIDE, POTASSIUM CHLORIDE, SODIUM LACTATE AND CALCIUM CHLORIDE: 600; 310; 30; 20 INJECTION, SOLUTION INTRAVENOUS at 09:49

## 2022-06-01 RX ADMIN — ROCURONIUM BROMIDE 50 MG: 10 INJECTION INTRAVENOUS at 08:40

## 2022-06-01 RX ADMIN — HYDROMORPHONE HYDROCHLORIDE 0.5 MG: 1 INJECTION, SOLUTION INTRAMUSCULAR; INTRAVENOUS; SUBCUTANEOUS at 10:52

## 2022-06-01 RX ADMIN — SCOPALAMINE 1 PATCH: 1 PATCH, EXTENDED RELEASE TRANSDERMAL at 07:18

## 2022-06-01 RX ADMIN — CLINDAMYCIN IN 5 PERCENT DEXTROSE 900 MG: 18 INJECTION, SOLUTION INTRAVENOUS at 08:34

## 2022-06-01 RX ADMIN — DEXAMETHASONE SODIUM PHOSPHATE 4 MG: 4 INJECTION, SOLUTION INTRA-ARTICULAR; INTRALESIONAL; INTRAMUSCULAR; INTRAVENOUS; SOFT TISSUE at 08:47

## 2022-06-01 RX ADMIN — ROPIVACAINE HYDROCHLORIDE 30 ML: 5 INJECTION, SOLUTION EPIDURAL; INFILTRATION; PERINEURAL at 08:02

## 2022-06-01 RX ADMIN — FENTANYL CITRATE 50 MCG: 50 INJECTION, SOLUTION INTRAMUSCULAR; INTRAVENOUS at 09:11

## 2022-06-01 RX ADMIN — LIDOCAINE HYDROCHLORIDE 100 MG: 20 INJECTION, SOLUTION EPIDURAL; INFILTRATION; INTRACAUDAL; PERINEURAL at 08:40

## 2022-06-01 RX ADMIN — SUGAMMADEX 200 MG: 100 INJECTION, SOLUTION INTRAVENOUS at 10:09

## 2022-06-01 RX ADMIN — TRANEXAMIC ACID 1000 MG: 10 INJECTION, SOLUTION INTRAVENOUS at 09:58

## 2022-06-01 RX ADMIN — GLYCOPYRROLATE 0.2 MG: 0.2 INJECTION INTRAMUSCULAR; INTRAVENOUS at 09:05

## 2022-06-01 RX ADMIN — GLYCOPYRROLATE 0.2 MG: 0.2 INJECTION INTRAMUSCULAR; INTRAVENOUS at 07:16

## 2022-06-01 RX ADMIN — SODIUM CHLORIDE 100 ML/HR: 9 INJECTION, SOLUTION INTRAVENOUS at 13:45

## 2022-06-01 RX ADMIN — CELECOXIB 200 MG: 100 CAPSULE ORAL at 07:15

## 2022-06-01 NOTE — PLAN OF CARE
Goal Outcome Evaluation:  Plan of Care Reviewed With: patient, spouse           Outcome Evaluation: Pt presents s/p R TKA with decreased strength, ROM, and functional independence. Skilled PT required to address these defecits. PT recommends discharge home with outaptient PT services

## 2022-06-01 NOTE — ANESTHESIA PROCEDURE NOTES
Peripheral Block      Patient reassessed immediately prior to procedure    Patient location during procedure: pre-op  Start time: 6/1/2022 8:02 AM  Stop time: 6/1/2022 8:05 AM  Reason for block: at surgeon's request and post-op pain management  Performed by  Anesthesiologist: Melly Munson DO  Preanesthetic Checklist  Completed: patient identified, IV checked, site marked, risks and benefits discussed, surgical consent, monitors and equipment checked, pre-op evaluation and timeout performed  Prep:  Pt Position: supine  Sterile barriers:alcohol skin prep, partial drape, cap, washed/disinfected hands, mask and gloves  Prep: ChloraPrep  Patient monitoring: blood pressure monitoring, continuous pulse oximetry and EKG  Procedure    Sedation: yes  Performed under: local infiltration  Guidance:ultrasound guided and nerve stimulator    ULTRASOUND INTERPRETATION. Using ultrasound guidance a 20 G gauge needle was placed in close proximity to the nerve, at which point, under ultrasound guidance anesthetic was injected in the area of the nerve and spread of the anesthesia was seen on ultrasound in close proximity thereto.  There were no abnormalities seen on ultrasound; a digital image was taken; and the patient tolerated the procedure with no complications. Images:still images obtained, printed/placed on chart    Laterality:right  Block Type:adductor canal block  Injection Technique:single-shot  Needle Type:echogenic  Needle Gauge:20 G (4in)  Resistance on Injection: none    Medications Used: ropivacaine (NAROPIN) 0.5 % injection, 30 mL  Med administered at 6/1/2022 8:02 AM      Post Assessment  Injection Assessment: negative aspiration for heme, no paresthesia on injection and incremental injection  Patient Tolerance:comfortable throughout block  Complications:no  Additional Notes  The block or continuous infusion is requested by the referring physician for management of postoperative pain, or pain related to a  procedure. Ultrasound guidance (deemed medically necessary). Painless injection, pt was awake and conversant during the procedure without complications. Needle and surrounding structures visualized throughout procedure. No adverse reactions or complications seen during this period. Post-procedure image showed no signs of complication, and anatomy was consistent with an uncomplicated nerve blockade.

## 2022-06-01 NOTE — OP NOTE
TOTAL KNEE ARTHROPLASTY  Procedure Report    Patient Name:  Christopher Finley  YOB: 1950    Date of Surgery:  6/1/2022     Indications: The patient is a 71-year-old male with right knee osteoarthritis.  He has failed conservative measures and wishes to undergo right total knee replacement.  Risk and benefits of surgery were discussed.  Informed consent was obtained and he wished to proceed.  Risk of bleeding, infection, damage to neurovascular structures, heart attack, stroke, DVT/PE, anesthesia complications including death, stiffness, periprosthetic fracture, deep infection, among others were discussed.  Informed consent was obtained and he wished to proceed.    Pre-op Diagnosis:   Primary osteoarthritis of right knee [M17.11]       Post-Op Diagnosis Codes:     * Primary osteoarthritis of right knee [M17.11]    Procedure/CPT® Codes:      Procedure(s):  TOTAL KNEE ARTHROPLASTY, right    Staff:  Surgeon(s):  Black Jimenez MD         Anesthesia: Choice    Estimated Blood Loss: 50 mL    Implants:    Implant Name Type Inv. Item Serial No.  Lot No. LRB No. Used Action   CMT BONE PALACOS R HI/VISC 1X40 - [id number] Implant CMT BONE PALACOS R HI/VISC 1X40  [facility] [id number] Right 2 Implanted   COMP FEM/KN PERSONA CR CMT COCR STD SZ8 RT - [id number] Implant COMP FEM/KN PERSONA CR CMT COCR STD SZ8 RT  [employer] [id number] Right 1 Implanted   STEM TIB PERSONA CMT 5D MARION RT - [id number] Implant STEM TIB PERSONA CMT 5D MARION RT  [employer] [id number] Right 1 Implanted   PAT KN PERSONA VE CRS/LNK CMT 35MM - [id number] Implant PAT KN PERSONA VE CRS/LNK CMT 35MM  [employer] [id number] Right 1 Implanted   ART/SRF KN PERSONA/VE MC EF 8TO11 10MM RT - [id number] Implant ART/SRF KN PERSONA/VE MC EF 8TO11 10MM RT  [employer] [id number] Right 1 Implanted       Specimen:          None        Findings: Right knee osteoarthritis    Complications: None    Description of Procedure: The patient was  brought to the operating room and placed supine on the OR table.  General anesthesia was given.  Preoperatively, the patient received an adductor canal nerve block. The patient was placed supine on the OR table.  All bony prominences were padded. The tourniquet was placed on the thigh. The affected lower extremity was prepped and draped in the usual sterile fashion. Preoperative antibiotic was given. Tranexamic acid intravenously was given at the beginning and the end of the surgery.  At this point, an Esmarch was used to exsanguinate the limb and the tourniquet was inflated. A longitudinal incision was made over the knee and a medial parapatellar arthrotomy was performed.  Appropriate releases were performed to the proximal medial tibia. The patellar fat pad was incised.  The patella was subluxed laterally and the knee was examined. There was severe tricompartmental wear and osteophyte formation.  The medial and lateral menisci were removed.  Osteophytes in the femur were debrided and intramedullary drill hole was made and a guide was placed in the femur.  A distal femoral cut was made at 5 degrees valgus angulation. At this point, we then sized the femur with posterior referencing with 3 degrees external rotation, pinning the cutting block into place.  The anterior, posterior, and chamfer cuts were made.  The bone was removed.  The tibia was then subluxed anteriorly and the extramedullary tibial guide was placed.  A 7 degree posterior slope was used.  This was pinned into place in alignment with the tibial crest and the second ray.  The proximal tibial cut was then made.  The spacer block was able to fit well in flexion and extension. Therefore, the tibial pins were removed. We then placed the knee in flexion where laminar spreaders were used to open the flexion gaps.  The menisci were removed.  Osteophytes were removed from the posterior femur. The posterior capsule was injected with anesthetic cocktail.  At this  point, the appropriately sized tibial tray was chosen.  This was pinned into place in line with the medial one third of the tibial tubercle. We then placed the trial femur. The trial insert was placed and the knee was brought to full extension. It was stable to varus and valgus stress.  We then everted the patella. It was resected and drilled, and a trial patella was placed. The patella tracked well with no evidence of instability.  The knee was then trialed with range of motion again. The femoral drill holes were made. The tibia was finished with the drill and the punch.  The components were removed. The knee was irrigated and dried. The cement was mixed and the tibia and femur were cemented into place.  The medial congruent spacer was then inserted.  The patella was cemented into place. The knee was irrigated with Irrisept and normal saline Pulsavac lavage.  The medial congruent polyethylene was inserted. The knee was stable to varus and valgus stress. There was good balance to the ligaments medially and laterally. There was good flexion with a stable patella. At this point, the tourniquet was released.  There was minimal bleeding controlled with electrocautery. The arthrotomy was closed with #1 Vicryl at 30 degrees of flexion, the subcutaneous tissues with 2-0 Vicryl, and the skin with staples.  An Aquacel dressing was placed and an Ace wrap was placed. Patient awoke from anesthesia in stable condition. There were no complications. All counts were correct.                 Black Jimenez MD     Date: 6/1/2022  Time: 10:23 EDT

## 2022-06-01 NOTE — THERAPY EVALUATION
Acute Care - Physical Therapy Initial Evaluation   [facility]     Patient Name: Christopher Finley  : 1950  MRN: [id number]  Today's Date: 2022     Admit date: 2022     Referring Physician: Stanford Cook MD     Surgery Date:2022   Procedure(s) (LRB):  TOTAL KNEE ARTHROPLASTY, right (Right)        Onset of Illness/Injury or Date of Surgery: 22  Visit Dx:     ICD-10-CM ICD-9-CM   1. Difficulty in walking  R26.2 719.7   2. Primary osteoarthritis of right knee  M17.11 715.16     Patient Active Problem List   Diagnosis   • Osteoarthritis of left elbow   • Lateral epicondylitis of left elbow   • Primary osteoarthritis of right knee   • Right knee pain   • Osteoarthritis of right knee   • Calculus of gallbladder with acute on chronic cholecystitis without obstruction   • Generalized abdominal pain   • Weight loss   • Dark stools   • Malaise and fatigue   • Osteoarthritis of right knee, unspecified osteoarthritis type   • Verbalizes suicidal thoughts     Past Medical History:   Diagnosis Date   • Acid reflux    • Anxiety    • Arthritis     generalized,  right knee   • Cancer (HCC)     RIGHT CHEEK SKIN CANCER REMOVED   • Chronic post-traumatic stress disorder (PTSD)    • Claustrophobia    • Depression    • Diabetes mellitus (HCC)     TYPE 2, BS IN THE A.M.    • GERD (gastroesophageal reflux disease)    • Hyperlipemia    • Hypertension    • Left elbow pain 2018   • Left shoulder pain 2018   • Left wrist pain 2018   • Mood disorder (HCC)    • Sleep apnea     cpap    • Suicide ideation     PT STATES SUFFERS FROM PTSD AND BECOMES SEVERELY ANXIOUS AND DEPRESSED, FOLLOWS W/ COUNSELOR AND TAKES MEDS TO HELP     Past Surgical History:   Procedure Laterality Date   • CHOLECYSTECTOMY N/A 2022    Procedure: CHOLECYSTECTOMY LAPAROSCOPIC;  Surgeon: Abdulkadir Sol MD;  [place] [facility]  Service: General;  Laterality: N/A;   • COLONOSCOPY     • COLONOSCOPY N/A 02/10/2022     Procedure: COLONOSCOPY with random biopsies, polypectomy with cold snare;  Surgeon: Lamont Hargrove MD;  Location: [facility] ENDOSCOPY;  Service: Gastroenterology;  Laterality: N/A;  diverticulosis, colon polyps    • CYSTOSCOPY     • ENDOSCOPY N/A 02/10/2022    Procedure: ESOPHAGOGASTRODUODENOSCOPY with biopsy;  Surgeon: Lamont Hargrove MD;  Location: [facility] ENDOSCOPY;  Service: Gastroenterology;  Laterality: N/A;  hiatal hernia   • HAND SURGERY      left thumb repair   • HEMORRHOIDECTOMY      BANDED, 2004,2006, 2007, 2020   • INGUINAL HERNIA REPAIR  1998    RIGHT   • LACERATION REPAIR      HEAD, AGE 18   • MOUTH SURGERY Right     CYST REMOVED LOWER RIGHT JAW   • PROSTATE BIOPSY     • SIGMOIDOSCOPY     • SKIN CANCER EXCISION      right cheek   • TENDON REPAIR      LEFT HAND   • UPPER GASTROINTESTINAL ENDOSCOPY       PT Assessment (last 12 hours)     PT Evaluation and Treatment     Row Name 06/01/22 1421          Physical Therapy Time and Intention    Subjective Information no complaints  -VELMA     Mode of Treatment individual therapy;physical therapy  -VELMA     Patient Effort good  -VELMA     Symptoms Noted During/After Treatment none  -VELMA     Row Name 06/01/22 1421          General Information    Patient Profile Reviewed yes  -VELMA     Onset of Illness/Injury or Date of Surgery 06/01/22  -VELMA     Patient Observations alert;cooperative;agree to therapy  -VELMA     Prior Level of Function independent:  -VELMA     Equipment Currently Used at Home none  -VELMA     Existing Precautions/Restrictions weight bearing;fall  WBAT  -VELMA     Risks Reviewed patient:  -VELMA     Benefits Reviewed patient:  -VELMA     Barriers to Rehab none identified  -VELMA     Row Name 06/01/22 1421          Previous Level of Function/Home Environm    Bed Mobility, Premorbid Functional Level independent  -VELMA     Transfers, Premorbid Functional Level independent  -VELMA     Household Ambulation, Premorbid Functional Level independent  -VELMA     Stairs, Premorbid  Functional Level independent  -VELMA     Community Ambulation, Premorbid Functional Level independent  -VELMA     Row Name 06/01/22 1421          Living Environment    Current Living Arrangements home  -VELMA     Home Accessibility stairs to enter home  3 steps to enter ; 12 step to go into basement (does not use)  -VELMA     People in Home spouse  -VELMA     Primary Care Provided by self;spouse/significant other  -VELMA     Row Name 06/01/22 1421          Home Main Entrance    Number of Stairs, Main Entrance three  -VELMA     Stair Railings, Main Entrance railings on both sides of stairs  -VELMA     Row Name 06/01/22 1421          Range of Motion (ROM)    Range of Motion bilateral lower extremities  L LE - WFL R LE - Knee approx 8-95 degrees, otherwise WFL  -VELMA     Row Name 06/01/22 1421          Strength (Manual Muscle Testing)    Strength (Manual Muscle Testing) bilateral lower extremities  Hip Flexion: L LE 4+/5, R LE 4/5 Knee Extension: L LE 5/5, R LE 4-/5 Knee Flexion: L LE 4+/5 L LE 4-/5 Dorsiflexion: 5/5 bilaterally  -VELMA     Row Name 06/01/22 1421          Bed Mobility    Bed Mobility bed mobility (all) activities  -VELMA     All Activities, [place] (Bed Mobility) modified independence  -VELMA     Assistive Device (Bed Mobility) bed rails  -VELMA     Row Name 06/01/22 1421          Transfers    Transfers sit-stand transfer  -VELMA     Sit-Stand [place] (Transfers) contact guard;verbal cues  -VELMA     Row Name 06/01/22 1421          Sit-Stand Transfer    Assistive Device (Sit-Stand Transfers) walker, front-wheeled  -VELMA     Row Name 06/01/22 1421          Gait/Stairs (Locomotion)    Gait/Stairs Locomotion gait/ambulation assistive device  -VELMA     [place] Level (Gait) contact guard  -VELMA     Assistive Device (Gait) walker, front-wheeled  -VELMA     Ambulated day of surgery or within 4 hours of PACU discharge yes  -VELMA     Distance in Feet (Gait) 20  -VELMA     Pattern (Gait) step-to  -VELMA     Deviations/Abnormal Patterns (Gait) eric  decreased;gait speed decreased;stride length decreased;weight shifting decreased  -VELMA     Row Name 06/01/22 1421          Balance    Balance Assessment standing dynamic balance  -VELMA     Dynamic Standing Balance contact guard  -VELMA     Position/Device Used, Standing Balance walker, front-wheeled  -VELMA     Row Name             [REMOVED] Wound 01/14/22 1225 medial umbilical area Incision    Wound - Properties Group Placement Date: 01/14/22  -ES Placement Time: 1225  -ES Present on Hospital Admission: N  -ES Orientation: medial  -ES Location: umbilical area  -ES Primary Wound Type: Incision  -ES, 4 Trocar sites  Removal Date: 06/01/22  -AS Removal Time: 0725  -AS, NOT PRESENT ON ADMISSION      Retired Wound - Properties Group Placement Date: 01/14/22  -ES Placement Time: 1225  -ES Present on Hospital Admission: N  -ES Orientation: medial  -ES Location: umbilical area  -ES Primary Wound Type: Incision  -ES, 4 Trocar sites  Removal Date: 06/01/22  -AS Removal Time: 0725  -AS, NOT PRESENT ON ADMISSION      Retired Wound - Properties Group Date first assessed: 01/14/22  -ES Time first assessed: 1225  -ES Present on Hospital Admission: N  -ES Location: umbilical area  -ES Primary Wound Type: Incision  -ES, 4 Trocar sites  Resolution Date: 06/01/22  -AS Resolution Time: 0725  -AS, NOT PRESENT ON ADMISSION      Row Name             Wound 06/01/22 0907 Right anterior knee Incision    Wound - Properties Group Placement Date: 06/01/22  -HG Placement Time: 0907  -HG Present on Hospital Admission: N  -HG Side: Right  -HG Orientation: anterior  -HG Location: knee  -HG Primary Wound Type: Incision  -HG Additional Comments: Surgical Site  -HG     Retired Wound - Properties Group Placement Date: 06/01/22  -HG Placement Time: 0907  -HG Present on Hospital Admission: N  -HG Side: Right  -HG Orientation: anterior  -HG Location: knee  -HG Primary Wound Type: Incision  -HG Additional Comments: Surgical Site  -HG     Retired Wound - Properties  Group Date first assessed: 06/01/22  -HG Time first assessed: 0907  -HG Present on Hospital Admission: N  -HG Side: Right  -HG Location: knee  -HG Primary Wound Type: Incision  -HG Additional Comments: Surgical Site  -HG     Row Name 06/01/22 1421          Plan of Care Review    Plan of Care Reviewed With patient;spouse  -VELMA     Outcome Evaluation Pt presents s/p R TKA with decreased strength, ROM, and functional independence. Skilled PT required to address these defecits. PT recommends discharge home with outpatient PT services  -VELMA     Row Name 06/01/22 1421          Positioning and Restraints    Pre-Treatment Position in bed  -VELMA     Post Treatment Position chair  -VELMA     In Chair reclined;call light within reach;with family/caregiver;with other staff;LILIAM elevated;BRENT elevated  -VELMA     Row Name 06/01/22 1421          Therapy Assessment/Plan (PT)    Patient/Family Therapy Goals Statement (PT) Pt reports goal is to be able to take his dog for a walk  -VELMA     Rehab Potential (PT) good, to achieve stated therapy goals  -VELMA     Criteria for Skilled Interventions Met (PT) yes;meets criteria;skilled treatment is necessary  -VELMA     Therapy Frequency (PT) 2 times/day  -VELMA     Predicted Duration of Therapy Intervention (PT) 10  -VELMA     Problem List (PT) balance;motor control;range of motion (ROM);strength  -VELMA     Activity Limitations Related to Problem List (PT) unable to ambulate safely;unable to transfer safely  -VELMA     Row Name 06/01/22 1421          Therapy Plan Review/Discharge Plan (PT)    Therapy Plan Review (PT) evaluation/treatment results reviewed;care plan/treatment goals reviewed;risks/benefits reviewed;patient;spouse/significant other  -VELMA     Row Name 06/01/22 1421          Physical Therapy Goals    Transfer Goal Selection (PT) transfer, PT goal 1  -VELMA     Gait Training Goal Selection (PT) gait training, PT goal 1  -VELMA     Row Name 06/01/22 1421          Transfer Goal 1 (PT)    Activity/Assistive Device  (Transfer Goal 1, PT) transfers, all  -VELMA     [place] Level/Cues Needed (Transfer Goal 1, PT) modified independence  -VELMA     Time Frame (Transfer Goal 1, PT) 10 days;long term goal (LTG)  -VELMA     Row Name 06/01/22 1421          Gait Training Goal 1 (PT)    Activity/Assistive Device (Gait Training Goal 1, PT) gait (walking locomotion);assistive device use  -VELMA     [place] Level (Gait Training Goal 1, PT) modified independence  -VELMA     Distance (Gait Training Goal 1, PT) 100  -VELMA     Time Frame (Gait Training Goal 1, PT) long term goal (LTG);10 days  -VELMA           User Key  (r) = Recorded By, (t) = Taken By, (c) = Cosigned By    Initials Name Provider Type    AS Del Davey RN Registered Nurse    Rich Cohen RN Registered Nurse    Shai Webber, PT Physical Therapist    Jensen Styles RN Registered Nurse                Physical Therapy Education                 Title: PT OT SLP Therapies (Done)     Topic: Physical Therapy (Done)     Point: Mobility training (Done)     Learning Progress Summary           Patient Acceptance, E, VU by VELMA at 6/1/2022 1439                   Point: Precautions (Done)     Learning Progress Summary           Patient Acceptance, E, VU by VELMA at 6/1/2022 1439                               User Key     Initials Effective Dates Name Provider Type Discipline    VELMA 06/03/21 -  Shai Ramirez, PT Physical Therapist PT              PT Recommendation and Plan  Anticipated Discharge Disposition (PT): home with outpatient therapy services  Planned Therapy Interventions (PT): balance training, gait training, home exercise program, manual therapy techniques, neuromuscular re-education, patient/family education, ROM (range of motion), stair training, strengthening, transfer training  Therapy Frequency (PT): 2 times/day  Plan of Care Reviewed With: patient, spouse  Outcome Evaluation: Pt presents s/p R TKA with decreased strength, ROM, and functional independence. Skilled PT  required to address these defecits. PT recommends discharge home with outpatient PT services   Outcome Measures     Row Name 06/01/22 1400             How much help from another person do you currently need...    Turning from your back to your side while in flat bed without using bedrails? 4  -VELMA      Moving from lying on back to sitting on the side of a flat bed without bedrails? 4  -VELMA      Moving to and from a bed to a chair (including a wheelchair)? 3  -VELMA      Standing up from a chair using your arms (e.g., wheelchair, bedside chair)? 4  -VELMA      Climbing 3-5 steps with a railing? 3  -VELMA      To walk in hospital room? 3  -VELMA      AM-PAC 6 Clicks Score (PT) 21  -VELMA            User Key  (r) = Recorded By, (t) = Taken By, (c) = Cosigned By    Initials Name Provider Type    Shai Webber, PT Physical Therapist                 Time Calculation:    PT Charges     Row Name 06/01/22 1436             Time Calculation    PT Received On 06/01/22  -VELMA      PT Goal Re-Cert Due Date 06/10/22  -VELMA              Untimed Charges    PT Eval/Re-eval Minutes 58  -VELMA              Total Minutes    Untimed Charges Total Minutes 58  -VELMA       Total Minutes 58  -VELMA            User Key  (r) = Recorded By, (t) = Taken By, (c) = Cosigned By    Initials Name Provider Type    Shai Webber, PT Physical Therapist              Therapy Charges for Today     Code Description Service Date Service Provider Modifiers Qty    61330092053 HC PT EVAL LOW COMPLEXITY 4 6/1/2022 Shai Ramirez, PT GP 1          PT G-Codes  AM-PAC 6 Clicks Score (PT): 21    Shai Ramirez PT  6/1/2022

## 2022-06-01 NOTE — ANESTHESIA PREPROCEDURE EVALUATION
Anesthesia Evaluation     Patient summary reviewed and Nursing notes reviewed   NPO Solid Status: > 6 hours  NPO Liquid Status: > 6 hours           Airway   Mallampati: II  TM distance: >3 FB  Dental      Pulmonary - normal exam   (+) sleep apnea,   Cardiovascular - normal exam  Exercise tolerance: good (4-7 METS)    (+) hypertension, hyperlipidemia,       Neuro/Psych  GI/Hepatic/Renal/Endo    (+) obesity,  GERD,  diabetes mellitus,     Musculoskeletal     Abdominal    Substance History      OB/GYN          Other                        Anesthesia Plan    ASA 3     general with block and ERAS Protocol     intravenous induction     Anesthetic plan, all risks, benefits, and alternatives have been provided, discussed and informed consent has been obtained with: patient.        CODE STATUS:

## 2022-06-01 NOTE — CONSULTS
" [facility]   PSYCHIATRIC CONSULTATION    Patient Name: Christopher Finley  : 1950  MRN: [id number]  Primary Care Physician:  Samy Wing MD  Date of admission: 2022    Referring Provider: Dr. Cook  Reason for Consultation: Suicidal ideations    Subjective   Subjective     Chief Complaint: \"Finally got my right knee replaced up and putting off for 2 years or more\"    HPI:     Christopher Finley is a 71 y.o. male history of osteoarthritis and got a right knee replacement.  Patient voicing suicidal ideation psychiatric consult was initiated.  Asked the patient why psychiatry was asked to see him and he feigned surprise and opens his eyes very wide and gasp and then laughs.  He reports not sure why psychiatry was asked to see him.  He reports he did tell them that he had some suicidal ideations when he was going through routine questions prior to surgery earlier today.  However he reports suicidal ideations to happen was about 2 months ago.  He reports that he had no plan or intention to anything harm himself.    Patient reports he does have depression.  He reports that he also suffers from posttraumatic stress disorder.  He does have a current outpatient psychiatric provider at [facility] prescribed medications he is currently on Tegretol, Wellbutrin, and a sleep medication.  He also has an individual therapist he sees on a regular basis through [facility].  Patient reports that he has no acute symptoms of depression or PTSD at this time.    Patient reports that he actually feels very good because this surgery he has been putting off is now done and he can get off of his mind.  He is on and forward for the rehabilitation of his knee and being able to move around better.  Patient is exhibiting a full range of affect during the exam.  He reports that his mood is good.  There is no psychomotor restlessness or agitation.  He is future and goal directed.    Review of Systems   All systems were reviewed and " negative except for: No complaints    Personal History     Past Medical History:   Diagnosis Date   • Acid reflux    • Anxiety    • Arthritis     generalized,  right knee   • Cancer (HCC)     RIGHT CHEEK SKIN CANCER REMOVED   • Chronic post-traumatic stress disorder (PTSD)    • Claustrophobia    • Depression    • Diabetes mellitus (HCC)     TYPE 2, BS IN THE A.M.    • GERD (gastroesophageal reflux disease)    • Hyperlipemia    • Hypertension    • Left elbow pain 06/18/2018   • Left shoulder pain 05/17/2018   • Left wrist pain 06/22/2018   • Mood disorder (HCC)    • Sleep apnea     cpap    • Suicide ideation     PT STATES SUFFERS FROM PTSD AND BECOMES SEVERELY ANXIOUS AND DEPRESSED, FOLLOWS W/ COUNSELOR AND TAKES MEDS TO HELP       Past Surgical History:   Procedure Laterality Date   • CHOLECYSTECTOMY N/A 01/14/2022    Procedure: CHOLECYSTECTOMY LAPAROSCOPIC;  Surgeon: Abdulkadir Sol MD;  Location: [facility] [place] [facility] [place];  Service: General;  Laterality: N/A;   • COLONOSCOPY     • COLONOSCOPY N/A 02/10/2022    Procedure: COLONOSCOPY with random biopsies, polypectomy with cold snare;  Surgeon: Lamont Hargrove MD;  Location: [facility] ENDOSCOPY;  Service: Gastroenterology;  Laterality: N/A;  diverticulosis, colon polyps    • CYSTOSCOPY     • ENDOSCOPY N/A 02/10/2022    Procedure: ESOPHAGOGASTRODUODENOSCOPY with biopsy;  Surgeon: Lamont Hargrove MD;  Location: [facility] ENDOSCOPY;  Service: Gastroenterology;  Laterality: N/A;  hiatal hernia   • HAND SURGERY      left thumb repair   • HEMORRHOIDECTOMY      BANDED, 2004,2006, 2007, 2020   • INGUINAL HERNIA REPAIR  1998    RIGHT   • LACERATION REPAIR      HEAD, AGE 18   • MOUTH SURGERY Right     CYST REMOVED LOWER RIGHT JAW   • PROSTATE BIOPSY     • SIGMOIDOSCOPY     • SKIN CANCER EXCISION      right cheek   • TENDON REPAIR      LEFT HAND   • UPPER GASTROINTESTINAL ENDOSCOPY         Past Psychiatric History: Currently under care of a provider at [facility] as well  "as [facility]      Family History: family history includes Arthritis in his father; Cancer in his mother; Heart disease in his mother; Kidney cancer in his father and mother; Stroke in his father. Otherwise pertinent FHx was reviewed and not pertinent to current issue.    Social History:  reports that he quit smoking about 51 years ago. His smoking use included cigarettes. He has never used smokeless tobacco. He reports that he does not drink alcohol and does not use drugs.    Substance Abuse History: reports that he quit smoking about 51 years ago. His smoking use included cigarettes. He has never used smokeless tobacco. He reports that he does not drink alcohol and does not use drugs.    Home Medications:  Hydrocortisone (Perianal), aspirin, buPROPion SR, carBAMazepine, carboxymethylcellulose, cyproheptadine, dicyclomine, dilTIAZem CD, escitalopram, lisinopril, metFORMIN, pantoprazole, rosuvastatin, and vitamin D3      Allergies:  Allergies   Allergen Reactions   • Amoxicillin Hives and Rash     Other reaction(s): rash       Objective   Objective     Vitals:   Temp:  [97.4 °F (36.3 °C)-98.4 °F (36.9 °C)] 97.7 °F (36.5 °C)  Heart Rate:  [51-68] 63  Resp:  [16-26] 16  BP: (108-138)/(50-65) 123/60  Flow (L/min):  [2-3] 2  Physical Exam       Mental Status Exam:     Sitting up in chair awake and alert.  He is calm, cooperative, engaging, makes good eye contact.  He is pleasant showing a full range of affect with no psychomotor restlessness or agitation.  Appears to be of average intelligence and reliable historian.    Hygiene:   good  Cooperation:  Cooperative  Eye Contact:  Good  Psychomotor Behavior:  Appropriate  Mood: \"Pretty good\"  Affect:  Full range and Congruent with stated mood  Speech:  Normal  Language: Appropriate, relevant  Thought Process:  Goal directed and Linear  Thought Content:  Normal  Suicidal:  None  Homicidal:  None  Hallucinations:  None  Delusion:  None  Memory:  Intact  Orientation:  Person, " Place, Time and Situation  Reliability:  good  Insight:  Fair  Judgement:  Fair  Impulse Control:  Good    Result Review    Result Review:  I have personally reviewed the results from the time of this admission to 6/1/2022 17:52 EDT and agree with these findings:  []  Laboratory  []  Microbiology  []  Radiology  []  EKG/Telemetry   []  Cardiology/Vascular   []  Pathology  []  Old records  []  Other:  Most notable findings include: No pertinent negative or positive    Assessment & Plan   Assessment / Plan     Brief Patient Summary:  Christopher Finley is a 71 y.o. male who admitted for right knee arthroplasty and voiced suicidal ideations    Active Hospital Problems:  Active Hospital Problems    Diagnosis    • **Primary osteoarthritis of right knee    • Osteoarthritis of right knee, unspecified osteoarthritis type    • Verbalizes suicidal thoughts        Plan:   Patient has regular routine outpatient follow-up with psychiatry for both medication management and individual therapy.  May follow up with these providers at discharge    May resume home medications for depression and PTSD    patient reports when he has to possibly do suicidal ideations he is talking about 2 months ago and nothing recent.  Patient, cooperative throughout the exam.  No acute agitation.    Discontinue sitter    No need for acute inpatient psychiatric care and the patient may discharge per psych    Electronically signed by Jensen Olivares MD, 06/01/22, 5:52 PM EDT.

## 2022-06-01 NOTE — ANESTHESIA POSTPROCEDURE EVALUATION
Patient: Christopher Finley    Procedure Summary     Date: 06/01/22 Room / Location: [facility] OR 06 / [facility] MAIN OR    Anesthesia Start: 0834 Anesthesia Stop: 1019    Procedure: TOTAL KNEE ARTHROPLASTY, right (Right Knee) Diagnosis:       Primary osteoarthritis of right knee      (Primary osteoarthritis of right knee [M17.11])    Surgeons: Black Jimenez MD Provider: Bubba Paiz MD    Anesthesia Type: general with block, ERAS Protocol ASA Status: 3          Anesthesia Type: general with block, ERAS Protocol    Vitals  Vitals Value Taken Time   /56 06/01/22 1045   Temp 36.6 °C (97.9 °F) 06/01/22 1020   Pulse 59 06/01/22 1048   Resp 16 06/01/22 1040   SpO2 97 % 06/01/22 1048   Vitals shown include unvalidated device data.        Post Anesthesia Care and Evaluation    Patient location during evaluation: bedside  Patient participation: complete - patient participated  Level of consciousness: awake  Pain management: adequate  Airway patency: patent  Anesthetic complications: No anesthetic complications  PONV Status: none  Cardiovascular status: acceptable and stable  Respiratory status: acceptable and room air  Hydration status: acceptable    Comments: An Anesthesiologist personally participated in the most demanding procedures (including induction and emergence if applicable) in the anesthesia plan, monitored the course of anesthesia administration at frequent intervals and remained physically present and available for immediate diagnosis and treatment of emergencies.

## 2022-06-01 NOTE — PLAN OF CARE
Goal Outcome Evaluation:               Patient admitted to  post surgical Right Knee. Patient alert and oriented, voice needs, oriented to room, safety plan in place for suicidal ideation, 1 on 1 with staff, family present upon admission to room. MD notified of safety plan.

## 2022-06-01 NOTE — H&P
[facility]   HISTORY AND PHYSICAL    Patient Name: Christopher Finley  : 1950  MRN: [id number]  Primary Care Physician:  Samy Wing MD  Date of admission: 2022    Subjective   Subjective     Chief Complaint:   Right knee surgery, suicidal thoughts    HPI:    Christopher Finley is a 71 y.o. male who had elective right knee replacement today.  Patient post surgery admitted for observation further patient reports he has depression and suicidal thoughts, he has been thinking about killing himself for last 6 -7 months.  He reports he does not want to kill himself now.  His wife is at bedside and she reports whenever he gets angry and had fights he says things like this to her.    Currently patient is postop from knee surgery, awake alert, no chest pain, no shortness of breath, no nausea vomiting.      Review of Systems:     Fatigue  Generalized weakness  No chest pain or shortness of breath  No nausea vomiting  No diarrhea  Depression    Personal History     Past Medical History:   Diagnosis Date   • Acid reflux    • Anxiety    • Arthritis     generalized,  right knee   • Cancer (HCC)     RIGHT CHEEK SKIN CANCER REMOVED   • Chronic post-traumatic stress disorder (PTSD)    • Claustrophobia    • Depression    • Diabetes mellitus (HCC)     TYPE 2, BS IN THE A.M.    • GERD (gastroesophageal reflux disease)    • Hyperlipemia    • Hypertension    • Left elbow pain 2018   • Left shoulder pain 2018   • Left wrist pain 2018   • Mood disorder (HCC)    • Sleep apnea     cpap    • Suicide ideation     PT STATES SUFFERS FROM PTSD AND BECOMES SEVERELY ANXIOUS AND DEPRESSED, FOLLOWS W/ COUNSELOR AND TAKES MEDS TO HELP       Past Surgical History:   Procedure Laterality Date   • CHOLECYSTECTOMY N/A 2022    Procedure: CHOLECYSTECTOMY LAPAROSCOPIC;  Surgeon: Abdulkadir Sol MD;  [place] [facility] [place] [facility] [place]  Service: General;  Laterality: N/A;   • COLONOSCOPY     • COLONOSCOPY N/A 02/10/2022     Procedure: COLONOSCOPY with random biopsies, polypectomy with cold snare;  Surgeon: Lamont Hargrove MD;  Location: [facility] ENDOSCOPY;  Service: Gastroenterology;  Laterality: N/A;  diverticulosis, colon polyps    • CYSTOSCOPY     • ENDOSCOPY N/A 02/10/2022    Procedure: ESOPHAGOGASTRODUODENOSCOPY with biopsy;  Surgeon: Lamont Hargrove MD;  Location: [facility] ENDOSCOPY;  Service: Gastroenterology;  Laterality: N/A;  hiatal hernia   • HAND SURGERY      left thumb repair   • HEMORRHOIDECTOMY      BANDED, 2004,2006, 2007, 2020   • INGUINAL HERNIA REPAIR  1998    RIGHT   • LACERATION REPAIR      HEAD, AGE 18   • MOUTH SURGERY Right     CYST REMOVED LOWER RIGHT JAW   • PROSTATE BIOPSY     • SIGMOIDOSCOPY     • SKIN CANCER EXCISION      right cheek   • TENDON REPAIR      LEFT HAND   • UPPER GASTROINTESTINAL ENDOSCOPY         Family History: family history includes Arthritis in his father; Cancer in his mother; Heart disease in his mother; Kidney cancer in his father and mother; Stroke in his father. Otherwise pertinent FHx was reviewed and not pertinent to current issue.    Social History:  reports that he quit smoking about 51 years ago. His smoking use included cigarettes. He has never used smokeless tobacco. He reports that he does not drink alcohol and does not use drugs.    Home Medications:  Hydrocortisone (Perianal), aspirin, buPROPion SR, carBAMazepine, carboxymethylcellulose, cyproheptadine, dicyclomine, dilTIAZem CD, escitalopram, lisinopril, metFORMIN, pantoprazole, rosuvastatin, and vitamin D3      Allergies:  Allergies   Allergen Reactions   • Amoxicillin Hives and Rash     Other reaction(s): rash       Objective   Objective     Vitals:   Temp:  [97.4 °F (36.3 °C)-98.4 °F (36.9 °C)] 97.9 °F (36.6 °C)  Heart Rate:  [51-68] 58  Resp:  [16-26] 18  BP: (108-138)/(50-65) 118/52  Flow (L/min):  [2-3] 2    Physical Exam  Elderly male not in acute distress awake alert and oriented  Pupils reactive external  ocular muscle intact  Neck supple heart regular  Lungs clear diminished breath sounds bilaterally  Abdomen soft and obese nontender  Extremities no edema right lower extremity with knee brace and surgical dressing  Neuro awake alert and oriented        I have personally reviewed the results from the time of this admission to 6/1/2022 13:21 EDT and agree with these findings:  [x]  Laboratory  []  Microbiology  []  Radiology  []  EKG/Telemetry   []  Cardiology/Vascular   []  Pathology  []  Old records  []  Other:    CBC:    WBC   Date Value Ref Range Status   05/31/2022 7.69 3.40 - 10.80 10*3/mm3 Final     RBC   Date Value Ref Range Status   05/31/2022 4.48 4.14 - 5.80 10*6/mm3 Final     Hemoglobin   Date Value Ref Range Status   05/31/2022 13.9 13.0 - 17.7 g/dL Final     Hematocrit   Date Value Ref Range Status   05/31/2022 40.9 37.5 - 51.0 % Final     MCV   Date Value Ref Range Status   05/31/2022 91.3 79.0 - 97.0 fL Final     MCH   Date Value Ref Range Status   05/31/2022 31.0 26.6 - 33.0 pg Final     MCHC   Date Value Ref Range Status   05/31/2022 34.0 31.5 - 35.7 g/dL Final     RDW   Date Value Ref Range Status   05/31/2022 13.0 12.3 - 15.4 % Final     RDW-SD   Date Value Ref Range Status   05/31/2022 42.7 37.0 - 54.0 fl Final     MPV   Date Value Ref Range Status   05/31/2022 9.5 6.0 - 12.0 fL Final     Platelets   Date Value Ref Range Status   05/31/2022 166 140 - 450 10*3/mm3 Final     Neutrophil %   Date Value Ref Range Status   05/31/2022 65.6 42.7 - 76.0 % Final     Lymphocyte %   Date Value Ref Range Status   05/31/2022 24.3 19.6 - 45.3 % Final     Monocyte %   Date Value Ref Range Status   05/31/2022 8.5 5.0 - 12.0 % Final     Eosinophil %   Date Value Ref Range Status   05/31/2022 0.9 0.3 - 6.2 % Final     Basophil %   Date Value Ref Range Status   05/31/2022 0.4 0.0 - 1.5 % Final     Immature Grans %   Date Value Ref Range Status   05/31/2022 0.3 0.0 - 0.5 % Final     Neutrophils, Absolute   Date Value  Ref Range Status   05/31/2022 5.05 1.70 - 7.00 10*3/mm3 Final     Lymphocytes, Absolute   Date Value Ref Range Status   05/31/2022 1.87 0.70 - 3.10 10*3/mm3 Final     Monocytes, Absolute   Date Value Ref Range Status   05/31/2022 0.65 0.10 - 0.90 10*3/mm3 Final     Eosinophils, Absolute   Date Value Ref Range Status   05/31/2022 0.07 0.00 - 0.40 10*3/mm3 Final     Basophils, Absolute   Date Value Ref Range Status   05/31/2022 0.03 0.00 - 0.20 10*3/mm3 Final     Immature Grans, Absolute   Date Value Ref Range Status   05/31/2022 0.02 0.00 - 0.05 10*3/mm3 Final     nRBC   Date Value Ref Range Status   05/31/2022 0.0 0.0 - 0.2 /100 WBC Final        BMP:    Lab Results   Component Value Date    GLUCOSE 114 (H) 05/31/2022    BUN 13 05/31/2022    CREATININE 0.83 05/31/2022    EGFRIFNONA 90 02/24/2022    BCR 15.7 05/31/2022    K 3.9 05/31/2022    CO2 25.7 05/31/2022    CALCIUM 9.2 05/31/2022    ALBUMIN 4.10 05/31/2022    LABIL2 1.0 (L) 12/06/2020    AST 16 05/31/2022    ALT 19 05/31/2022        XR Knee 1 or 2 View Right    Result Date: 6/1/2022   Status post total knee arthroplasty in near anatomic alignment, with no evidence of immediate complication.      JONNIE PEDRO MD       Electronically Signed and Approved By: JONNIE PEDRO MD on 6/01/2022 at 11:37                      Assessment & Plan   Assessment / Plan       Current Diagnosis:  Active Hospital Problems    Diagnosis    • **Primary osteoarthritis of right knee    • Osteoarthritis of right knee, unspecified osteoarthritis type    • Verbalizes suicidal thoughts      Plan:   Patient postsurgery medically stable from surgical standpoint.  Restart essential home meds  DVT and GI prophylaxis per protocol  Perioperative antibiotics  Psych Consult depression and suicidal thought  Close watch for now      DVT prophylaxis:  Medical and mechanical DVT prophylaxis orders are present.    GI Prophylaxis:    Pepcid    CODE STATUS:    Level Of Support Discussed With:  Patient  Code Status (Patient has no pulse and is not breathing): CPR (Attempt to Resuscitate)  Medical Interventions (Patient has pulse or is breathing): Full Support    Admission Status:  I believe this patient meets observation status.             I have dictated this note utilizing Dragon Dictation.             Please note that portions of this note were completed with a voice recognition program.             Part of this note may be an electronic transcription/translation of spoken language to printed text         using the Dragon Dictation System.       Electronically signed by Stanford Cook MD, 06/01/22, 1:20 PM EDT.    Total time spent with in evaluation and management: 31 minutes

## 2022-06-02 PROBLEM — M17.11 PRIMARY OSTEOARTHRITIS OF RIGHT KNEE: Status: RESOLVED | Noted: 2021-11-23 | Resolved: 2022-06-02

## 2022-06-02 PROBLEM — R45.851 VERBALIZES SUICIDAL THOUGHTS: Status: RESOLVED | Noted: 2022-06-01 | Resolved: 2022-06-02

## 2022-06-02 LAB
HCT VFR BLD AUTO: 34.2 % (ref 37.5–51)
HGB BLD-MCNC: 11.8 G/DL (ref 13–17.7)

## 2022-06-02 PROCEDURE — 97116 GAIT TRAINING THERAPY: CPT

## 2022-06-02 PROCEDURE — 97150 GROUP THERAPEUTIC PROCEDURES: CPT

## 2022-06-02 PROCEDURE — 97530 THERAPEUTIC ACTIVITIES: CPT

## 2022-06-02 PROCEDURE — 85014 HEMATOCRIT: CPT | Performed by: ORTHOPAEDIC SURGERY

## 2022-06-02 PROCEDURE — 25010000002 KETOROLAC TROMETHAMINE PER 15 MG: Performed by: ORTHOPAEDIC SURGERY

## 2022-06-02 PROCEDURE — 97165 OT EVAL LOW COMPLEX 30 MIN: CPT

## 2022-06-02 PROCEDURE — 63710000001 HYDROCODONE-ACETAMINOPHEN 7.5-325 MG TABLET: Performed by: ORTHOPAEDIC SURGERY

## 2022-06-02 PROCEDURE — A9270 NON-COVERED ITEM OR SERVICE: HCPCS | Performed by: ORTHOPAEDIC SURGERY

## 2022-06-02 PROCEDURE — 97535 SELF CARE MNGMENT TRAINING: CPT

## 2022-06-02 PROCEDURE — 63710000001 DILTIAZEM CD 120 MG CAPSULE SUSTAINED-RELEASE 24 HR: Performed by: INTERNAL MEDICINE

## 2022-06-02 PROCEDURE — 85018 HEMOGLOBIN: CPT | Performed by: ORTHOPAEDIC SURGERY

## 2022-06-02 PROCEDURE — A9270 NON-COVERED ITEM OR SERVICE: HCPCS | Performed by: INTERNAL MEDICINE

## 2022-06-02 PROCEDURE — 63710000001 APIXABAN 2.5 MG TABLET: Performed by: ORTHOPAEDIC SURGERY

## 2022-06-02 PROCEDURE — 94799 UNLISTED PULMONARY SVC/PX: CPT

## 2022-06-02 RX ORDER — ASPIRIN 325 MG
325 TABLET, DELAYED RELEASE (ENTERIC COATED) ORAL DAILY
Qty: 14 TABLET | Refills: 0 | Status: SHIPPED | OUTPATIENT
Start: 2022-06-10 | End: 2022-07-18

## 2022-06-02 RX ORDER — HYDROCODONE BITARTRATE AND ACETAMINOPHEN 7.5; 325 MG/1; MG/1
1-2 TABLET ORAL EVERY 4 HOURS PRN
Qty: 40 TABLET | Refills: 0 | Status: SHIPPED | OUTPATIENT
Start: 2022-06-02 | End: 2022-06-07 | Stop reason: SDUPTHER

## 2022-06-02 RX ORDER — DILTIAZEM HYDROCHLORIDE 120 MG/1
120 CAPSULE, COATED, EXTENDED RELEASE ORAL
Status: DISCONTINUED | OUTPATIENT
Start: 2022-06-02 | End: 2022-06-02 | Stop reason: HOSPADM

## 2022-06-02 RX ADMIN — KETOROLAC TROMETHAMINE 15 MG: 15 INJECTION, SOLUTION INTRAMUSCULAR; INTRAVENOUS at 05:47

## 2022-06-02 RX ADMIN — HYDROCODONE BITARTRATE AND ACETAMINOPHEN 2 TABLET: 7.5; 325 TABLET ORAL at 08:41

## 2022-06-02 RX ADMIN — APIXABAN 2.5 MG: 2.5 TABLET, FILM COATED ORAL at 08:41

## 2022-06-02 RX ADMIN — KETOROLAC TROMETHAMINE 15 MG: 15 INJECTION, SOLUTION INTRAMUSCULAR; INTRAVENOUS at 00:49

## 2022-06-02 RX ADMIN — CLINDAMYCIN IN 5 PERCENT DEXTROSE 900 MG: 18 INJECTION, SOLUTION INTRAVENOUS at 00:49

## 2022-06-02 RX ADMIN — DILTIAZEM HYDROCHLORIDE 120 MG: 120 CAPSULE, COATED, EXTENDED RELEASE ORAL at 14:54

## 2022-06-02 NOTE — SIGNIFICANT NOTE
06/02/22 0734   Plan   Final Discharge Disposition Code 06 - home with home health care   [facility]

## 2022-06-02 NOTE — THERAPY TREATMENT NOTE
Acute Care - Physical Therapy Treatment Note   [facility]     Patient Name: Christopher Finley  : 1950  MRN: [id number]  Today's Date: 2022 Gait- individual; ther- ex -group setting; 3 participants      Onset of Illness/Injury or Date of Surgery: 22  Visit Dx:     ICD-10-CM ICD-9-CM   1. Difficulty in walking  R26.2 719.7   2. Primary osteoarthritis of right knee  M17.11 715.16   3. Osteoarthritis of right knee, unspecified osteoarthritis type  M17.11 715.96   4. Decreased activities of daily living (ADL)  Z78.9 V49.89     Patient Active Problem List   Diagnosis   • Osteoarthritis of left elbow   • Lateral epicondylitis of left elbow   • Primary osteoarthritis of right knee   • Right knee pain   • Osteoarthritis of right knee   • Calculus of gallbladder with acute on chronic cholecystitis without obstruction   • Generalized abdominal pain   • Weight loss   • Dark stools   • Malaise and fatigue   • Osteoarthritis of right knee, unspecified osteoarthritis type   • Verbalizes suicidal thoughts     Past Medical History:   Diagnosis Date   • Acid reflux    • Anxiety    • Arthritis     generalized,  right knee   • Cancer (HCC)     RIGHT CHEEK SKIN CANCER REMOVED   • Chronic post-traumatic stress disorder (PTSD)    • Claustrophobia    • Depression    • Diabetes mellitus (HCC)     TYPE 2, BS IN THE A.[facility]    • GERD (gastroesophageal reflux disease)    • Hyperlipemia    • Hypertension    • Left elbow pain 2018   • Left shoulder pain 2018   • Left wrist pain 2018   • Mood disorder (HCC)    • Sleep apnea     cpap    • Suicide ideation     PT STATES SUFFERS FROM PTSD AND BECOMES SEVERELY ANXIOUS AND DEPRESSED, FOLLOWS W/ COUNSELOR AND TAKES MEDS TO HELP     Past Surgical History:   Procedure Laterality Date   • CHOLECYSTECTOMY N/A 2022    Procedure: CHOLECYSTECTOMY LAPAROSCOPIC;  Surgeon: Abdulkadir Sol MD;  Location[place] [facility] [place] [facility] [place]  Service: General;  Laterality: N/A;   •  COLONOSCOPY     • COLONOSCOPY N/A 02/10/2022    Procedure: COLONOSCOPY with random biopsies, polypectomy with cold snare;  Surgeon: Lamont Hargrove MD;  Location: [facility] ENDOSCOPY;  Service: Gastroenterology;  Laterality: N/A;  diverticulosis, colon polyps    • CYSTOSCOPY     • ENDOSCOPY N/A 02/10/2022    Procedure: ESOPHAGOGASTRODUODENOSCOPY with biopsy;  Surgeon: Lamont Hargrove MD;  Location: [facility] ENDOSCOPY;  Service: Gastroenterology;  Laterality: N/A;  hiatal hernia   • HAND SURGERY      left thumb repair   • HEMORRHOIDECTOMY      BANDED, 2004,2006, 2007, 2020   • INGUINAL HERNIA REPAIR  1998    RIGHT   • LACERATION REPAIR      HEAD, AGE 18   • MOUTH SURGERY Right     CYST REMOVED LOWER RIGHT JAW   • PROSTATE BIOPSY     • SIGMOIDOSCOPY     • SKIN CANCER EXCISION      right cheek   • TENDON REPAIR      LEFT HAND   • TOTAL KNEE ARTHROPLASTY Right 6/1/2022    Procedure: TOTAL KNEE ARTHROPLASTY, right;  Surgeon: Black Jimenez MD;  Location: [facility] MAIN OR;  Service: Orthopedics;  Laterality: Right;   • UPPER GASTROINTESTINAL ENDOSCOPY       PT Assessment (last 12 hours)     PT Evaluation and Treatment     Row Name 06/02/22 1232          Physical Therapy Time and Intention    Subjective Information no complaints  -RH     Document Type therapy note (daily note)  -RH     Mode of Treatment physical therapy;group therapy;individual therapy  -RH     Patient Effort fair  -RH     Row Name 06/02/22 1232          Pain    Additional Documentation Pain Scale: FACES Pre/Post-Treatment (Group)  -     Row Name 06/02/22 1232          Pain Scale: FACES Pre/Post-Treatment    Pain: FACES Scale, Pretreatment 2-->hurts little bit  -RH     Posttreatment Pain Rating 2-->hurts little bit  -RH     Pain Location - Side/Orientation Right  -RH     Pain Location - knee  -RH     Row Name 06/02/22 1232          Range of Motion (ROM)    Range of Motion --  Pt R knee AAROM at 98 degrees flex and 5 degrees ext.  -     Row  Name 06/02/22 1232          Strength (Manual Muscle Testing)    Strength (Manual Muscle Testing) --  Pt R knee ext strength at 3-/5.  -RH     Row Name 06/02/22 1232          Transfers    Transfers sit-stand transfer;stand-sit transfer  -RH     Sit-Stand [place] (Transfers) contact guard  -RH     Stand-Sit [place] (Transfers) contact guard  -RH     Row Name 06/02/22 1232          Sit-Stand Transfer    Assistive Device (Sit-Stand Transfers) walker, front-wheeled  -RH     Row Name 06/02/22 1232          Stand-Sit Transfer    Assistive Device (Stand-Sit Transfers) walker, front-wheeled  -RH     Row Name 06/02/22 1232          Gait/Stairs (Locomotion)    Gait/Stairs Locomotion gait/ambulation independence;gait/ambulation assistive device;distance ambulated;gait pattern;gait deviations  -     [place] Level (Gait) contact guard  -RH     Assistive Device (Gait) walker, front-wheeled  -RH     Distance in Feet (Gait) 155  -RH     Pattern (Gait) 3-point;step-through  -RH     Deviations/Abnormal Patterns (Gait) base of support, narrow;gait speed decreased;stride length decreased  -RH     Negotiation (Stairs) stairs independence;stairs assistive device;handrail location;number of steps;ascending technique;descending technique  -RH     [place] Level (Stairs) contact guard  -     Handrail Location (Stairs) left side (ascending);left side (descending)  -     Number of Steps (Stairs) 5 x 2  -RH     Ascending Technique (Stairs) step-to-step  -RH     Descending Technique (Stairs) step-to-step  -RH     Row Name             Wound 06/01/22 0907 Right anterior knee Incision    Wound - Properties Group Placement Date: 06/01/22  -HG Placement Time: 0907  -HG Present on Hospital Admission: N  -HG Side: Right  -HG Orientation: anterior  -HG Location: knee  -HG Primary Wound Type: Incision  -HG Additional Comments: Surgical Site  -HG     Retired Wound - Properties Group Placement Date: 06/01/22  -HG Placement Time:  0907  -HG Present on Hospital Admission: N  -HG Side: Right  -HG Orientation: anterior  -HG Location: knee  -HG Primary Wound Type: Incision  -HG Additional Comments: Surgical Site  -HG     Retired Wound - Properties Group Date first assessed: 06/01/22  -HG Time first assessed: 0907 -HG Present on Hospital Admission: N  -HG Side: Right  -HG Location: knee  -HG Primary Wound Type: Incision  -HG Additional Comments: Surgical Site  -HG     Row Name 06/02/22 1232          Progress Summary (PT)    Progress Toward Functional Goals (PT) progress toward functional goals is good  -RH           User Key  (r) = Recorded By, (t) = Taken By, (c) = Cosigned By    Initials Name Provider Type    HG Rich Carreno, RN Registered Nurse    RH Sherman Mckinney PTA Physical Therapist Assistant                Right Knee Ther-ex   Exercise  Reps  Sets    Long arc Quads   10 2   Short arc Quads  10 2   Heel Slides  10 2   Ankle Pumps  10 2   Quad sets  10 2   Glut sets  10 2   Straight leg raise  10 2          Physical Therapy Education                 Title: PT OT SLP Therapies (Done)     Topic: Physical Therapy (Done)     Point: Mobility training (Done)     Learning Progress Summary           Patient Acceptance, E,D, DU by PG at 6/2/2022 0927    Acceptance, E, VU by VELMA at 6/1/2022 1439                   Point: Precautions (Done)     Learning Progress Summary           Patient Acceptance, E,D, DU by PG at 6/2/2022 0927    Acceptance, E, VU by VELMA at 6/1/2022 1439                               User Key     Initials Effective Dates Name Provider Type Discipline    PG 06/16/21 -  Migue Almaguer OT Occupational Therapist OT    VELMA 06/03/21 -  Shai Ramirez PT Physical Therapist PT              PT Recommendation and Plan     Progress Summary (PT)  Progress Toward Functional Goals (PT): progress toward functional goals is good   Outcome Measures     Row Name 06/02/22 1200 06/01/22 1400          How much help from another person do you  currently need...    Turning from your back to your side while in flat bed without using bedrails? 4  -RH 4  -VELMA     Moving from lying on back to sitting on the side of a flat bed without bedrails? 4  -RH 4  -VELMA     Moving to and from a bed to a chair (including a wheelchair)? 3  -RH 3  -VELMA     Standing up from a chair using your arms (e.g., wheelchair, bedside chair)? 4  -RH 4  -VELMA     Climbing 3-5 steps with a railing? 4  -RH 3  -VELMA     To walk in hospital room? 4  -RH 3  -VELMA     AM-PAC 6 Clicks Score (PT) 23  -RH 21  -VELMA           User Key  (r) = Recorded By, (t) = Taken By, (c) = Cosigned By    Initials Name Provider Type     Sherman Mckinney PTA Physical Therapist Assistant    Shia Webber, PT Physical Therapist                 Time Calculation:    PT Charges     Row Name 06/02/22 1231             Time Calculation    PT Received On 06/02/22  -RH              Timed Charges    66236 - Gait Training Minutes  9  -RH      23346 - PT Therapeutic Activity Minutes 4  -RH              Untimed Charges    PT Group Therapy Minutes 35  -RH              Total Minutes    Timed Charges Total Minutes 13  -RH      Untimed Charges Total Minutes 35  -RH       Total Minutes 48  -RH            User Key  (r) = Recorded By, (t) = Taken By, (c) = Cosigned By    Initials Name Provider Type     Sherman Mckinney PTA Physical Therapist Assistant              Therapy Charges for Today     Code Description Service Date Service Provider Modifiers Qty    10500399383 HC GAIT TRAINING EA 15 MIN 6/2/2022 Sherman Mckinney PTA GP 1    24064180634 HC PT THER PROC GROUP 6/2/2022 Sherman Mckinney PTA GP 1          PT G-Codes  Outcome Measure Options: AM-PAC 6 Clicks Daily Activity (OT), Optimal Instrument  AM-PAC 6 Clicks Score (PT): 23  AM-PAC 6 Clicks Score (OT): 19    Sherman Mckinney PTA  6/2/2022

## 2022-06-02 NOTE — PROGRESS NOTES
[facility]     Progress Note    Patient Name: Christopher Finley  : 1950  MRN: [id number]  Primary Care Physician:  Samy Wing MD  Date of admission: 2022    Subjective   Subjective     HPI:  Patient Reports doing well this morning.  He is sitting in the chair.  He denies any chest pain or shortness of air.  He has been working with physical therapy.    Review of Systems   See HPI    Objective   Objective     Vitals:   Temp:  [97.3 °F (36.3 °C)-98.4 °F (36.9 °C)] 97.7 °F (36.5 °C)  Heart Rate:  [52-68] 60  Resp:  [16-26] 20  BP: (108-134)/(50-65) 125/56  Flow (L/min):  [2-3] 2  Physical Exam    General: Alert, no acute distress   Chest: Unlabored breathing, cardiovascular: Regular heart rate   Musculoskeletal: Neurovascular intact to extremity.  Positive pulses.  Dressing intact without significant drainage.  Negative Qing.    Result Review      WBC   Date Value Ref Range Status   2022 7.69 3.40 - 10.80 10*3/mm3 Final     RBC   Date Value Ref Range Status   2022 4.48 4.14 - 5.80 10*6/mm3 Final     Hemoglobin   Date Value Ref Range Status   2022 11.8 (L) 13.0 - 17.7 g/dL Final     Hematocrit   Date Value Ref Range Status   2022 34.2 (L) 37.5 - 51.0 % Final     MCV   Date Value Ref Range Status   2022 91.3 79.0 - 97.0 fL Final     MCH   Date Value Ref Range Status   2022 31.0 26.6 - 33.0 pg Final     MCHC   Date Value Ref Range Status   2022 34.0 31.5 - 35.7 g/dL Final     RDW   Date Value Ref Range Status   2022 13.0 12.3 - 15.4 % Final     RDW-SD   Date Value Ref Range Status   2022 42.7 37.0 - 54.0 fl Final     MPV   Date Value Ref Range Status   2022 9.5 6.0 - 12.0 fL Final     Platelets   Date Value Ref Range Status   2022 166 140 - 450 10*3/mm3 Final     Neutrophil %   Date Value Ref Range Status   2022 65.6 42.7 - 76.0 % Final     Lymphocyte %   Date Value Ref Range Status   2022 24.3 19.6 - 45.3 % Final      Monocyte %   Date Value Ref Range Status   05/31/2022 8.5 5.0 - 12.0 % Final     Eosinophil %   Date Value Ref Range Status   05/31/2022 0.9 0.3 - 6.2 % Final     Basophil %   Date Value Ref Range Status   05/31/2022 0.4 0.0 - 1.5 % Final     Immature Grans %   Date Value Ref Range Status   05/31/2022 0.3 0.0 - 0.5 % Final     Neutrophils, Absolute   Date Value Ref Range Status   05/31/2022 5.05 1.70 - 7.00 10*3/mm3 Final     Lymphocytes, Absolute   Date Value Ref Range Status   05/31/2022 1.87 0.70 - 3.10 10*3/mm3 Final     Monocytes, Absolute   Date Value Ref Range Status   05/31/2022 0.65 0.10 - 0.90 10*3/mm3 Final     Eosinophils, Absolute   Date Value Ref Range Status   05/31/2022 0.07 0.00 - 0.40 10*3/mm3 Final     Basophils, Absolute   Date Value Ref Range Status   05/31/2022 0.03 0.00 - 0.20 10*3/mm3 Final     Immature Grans, Absolute   Date Value Ref Range Status   05/31/2022 0.02 0.00 - 0.05 10*3/mm3 Final     nRBC   Date Value Ref Range Status   05/31/2022 0.0 0.0 - 0.2 /100 WBC Final        Result Review:  I have personally reviewed the results from the time of this admission to 6/2/2022 07:53 EDT and agree with these findings:  [x]  Laboratory  []  Microbiology  [x]  Radiology  []  EKG/Telemetry   []  Cardiology/Vascular   []  Pathology  []  Old records  []  Other:      Assessment & Plan   Assessment / Plan     Brief Patient Summary:  Christopher Finley is a 71 y.o. male who is postoperative day #1 status post right total knee replacement    Active Hospital Problems:  Active Hospital Problems    Diagnosis    • **Primary osteoarthritis of right knee    • Osteoarthritis of right knee, unspecified osteoarthritis type    • Verbalizes suicidal thoughts      Plan: Weightbearing as tolerated with walker.  Physical and Occupational Therapy.  DVT prophylaxis.  Pain control  Discharge home later today if medically able.       DVT prophylaxis:  Medical and mechanical DVT prophylaxis orders are present.    CODE  STATUS:   Level Of Support Discussed With: Patient  Code Status (Patient has no pulse and is not breathing): CPR (Attempt to Resuscitate)  Medical Interventions (Patient has pulse or is breathing): Full Support    Disposition:  I expect patient to be discharged home later today with home health services if medically able.    Electronically signed by Black Jimenez MD, 06/02/22, 7:53 AM EDT.

## 2022-06-02 NOTE — THERAPY TREATMENT NOTE
Acute Care - Physical Therapy Treatment Note   [facility]     Patient Name: Christopher Finley  : 1950  MRN: [id number]  Today's Date: 2022 Gait- individual; ther- ex -group setting; 3 participants      Onset of Illness/Injury or Date of Surgery: 22  Visit Dx:     ICD-10-CM ICD-9-CM   1. Difficulty in walking  R26.2 719.7   2. Primary osteoarthritis of right knee  M17.11 715.16   3. Osteoarthritis of right knee, unspecified osteoarthritis type  M17.11 715.96   4. Decreased activities of daily living (ADL)  Z78.9 V49.89     Patient Active Problem List   Diagnosis   • Osteoarthritis of left elbow   • Lateral epicondylitis of left elbow   • Right knee pain   • Osteoarthritis of right knee   • Calculus of gallbladder with acute on chronic cholecystitis without obstruction   • Generalized abdominal pain   • Weight loss   • Dark stools   • Malaise and fatigue   • Osteoarthritis of right knee, unspecified osteoarthritis type     Past Medical History:   Diagnosis Date   • Acid reflux    • Anxiety    • Arthritis     generalized,  right knee   • Cancer (HCC)     RIGHT CHEEK SKIN CANCER REMOVED   • Chronic post-traumatic stress disorder (PTSD)    • Claustrophobia    • Depression    • Diabetes mellitus (HCC)     TYPE 2, BS IN THE A.M[facility]    • GERD (gastroesophageal reflux disease)    • Hyperlipemia    • Hypertension    • Left elbow pain 2018   • Left shoulder pain 2018   • Left wrist pain 2018   • Mood disorder (HCC)    • Sleep apnea     cpap    • Suicide ideation     PT STATES SUFFERS FROM PTSD AND BECOMES SEVERELY ANXIOUS AND DEPRESSED, FOLLOWS W/ COUNSELOR AND TAKES MEDS TO HELP     Past Surgical History:   Procedure Laterality Date   • CHOLECYSTECTOMY N/A 2022    Procedure: CHOLECYSTECTOMY LAPAROSCOPIC;  Surgeon: Abdulkadir Sol MD;  Location: [facility] OR [place];  Service: General;  Laterality: N/A;   • COLONOSCOPY     • COLONOSCOPY N/A 02/10/2022    Procedure: COLONOSCOPY with random  biopsies, polypectomy with cold snare;  Surgeon: Lamont Hargrove MD;  Location: [facility] ENDOSCOPY;  Service: Gastroenterology;  Laterality: N/A;  diverticulosis, colon polyps    • CYSTOSCOPY     • ENDOSCOPY N/A 02/10/2022    Procedure: ESOPHAGOGASTRODUODENOSCOPY with biopsy;  Surgeon: Lamont Hargrove MD;  Location: [facility] ENDOSCOPY;  Service: Gastroenterology;  Laterality: N/A;  hiatal hernia   • HAND SURGERY      left thumb repair   • HEMORRHOIDECTOMY      BANDED, 2004,2006, 2007, 2020   • INGUINAL HERNIA REPAIR  1998    RIGHT   • LACERATION REPAIR      HEAD, AGE 18   • MOUTH SURGERY Right     CYST REMOVED LOWER RIGHT JAW   • PROSTATE BIOPSY     • SIGMOIDOSCOPY     • SKIN CANCER EXCISION      right cheek   • TENDON REPAIR      LEFT HAND   • TOTAL KNEE ARTHROPLASTY Right 6/1/2022    Procedure: TOTAL KNEE ARTHROPLASTY, right;  Surgeon: Black Jimenez MD;  Location: [facility] MAIN OR;  Service: Orthopedics;  Laterality: Right;   • UPPER GASTROINTESTINAL ENDOSCOPY       PT Assessment (last 12 hours)     PT Evaluation and Treatment     Row Name 06/02/22 1449 06/02/22 1232       Physical Therapy Time and Intention    Subjective Information no complaints  -RH no complaints  -RH    Document Type therapy note (daily note)  - therapy note (daily note)  -    Mode of Treatment physical therapy;group therapy;individual therapy  - physical therapy;group therapy;individual therapy  -    Patient Effort fair  -RH fair  -RH    Row Name 06/02/22 1232          Pain    Additional Documentation Pain Scale: FACES Pre/Post-Treatment (Group)  -     Row Name 06/02/22 1449 06/02/22 1232       Pain Scale: FACES Pre/Post-Treatment    Pain: FACES Scale, Pretreatment 0-->no hurt  -RH 2-->hurts little bit  -RH    Posttreatment Pain Rating 0-->no hurt  -RH 2-->hurts little bit  -RH    Pain Location - Side/Orientation -- Right  -RH    Pain Location - -- knee  -RH    Row Name 06/02/22 1232          Range of Motion (ROM)     Range of Motion --  Pt R knee AAROM at 98 degrees flex and 5 degrees ext.  -     Row Name 06/02/22 1232          Strength (Manual Muscle Testing)    Strength (Manual Muscle Testing) --  Pt R knee ext strength at 3-/5.  -     Row Name 06/02/22 1449 06/02/22 1232       Transfers    Transfers sit-stand transfer;stand-sit transfer  - sit-stand transfer;stand-sit transfer  -RH    Sit-Stand Haines (Transfers) contact guard  -RH contact guard  -RH    Stand-Sit Haines (Transfers) contact guard  - contact guard  -RH    Row Name 06/02/22 1449 06/02/22 1232       Sit-Stand Transfer    Assistive Device (Sit-Stand Transfers) walker, front-wheeled  - walker, front-wheeled  -    Row Name 06/02/22 1449 06/02/22 1232       Stand-Sit Transfer    Assistive Device (Stand-Sit Transfers) walker, front-wheeled  - walker, front-wheeled  -    Row Name 06/02/22 1449 06/02/22 1232       Gait/Stairs (Locomotion)    Gait/Stairs Locomotion gait/ambulation independence;gait/ambulation assistive device;distance ambulated;gait pattern;gait deviations  - gait/ambulation independence;gait/ambulation assistive device;distance ambulated;gait pattern;gait deviations  -    Haines Level (Gait) contact guard  - contact guard  -    Assistive Device (Gait) walker, front-wheeled  - walker, front-wheeled  -    Distance in Feet (Gait) 155  -  -    Pattern (Gait) 3-point;step-through  -RH 3-point;step-through  -RH    Deviations/Abnormal Patterns (Gait) -- base of support, narrow;gait speed decreased;stride length decreased  -    Negotiation (Stairs) -- stairs independence;stairs assistive device;handrail location;number of steps;ascending technique;descending technique  -    Haines Level (Stairs) -- contact guard  -    Handrail Location (Stairs) -- left side (ascending);left side (descending)  -    Number of Steps (Stairs) -- 5 x 2  -RH    Ascending Technique (Stairs) -- step-to-step  -     Descending Technique (Stairs) -- step-to-step  -RH    Row Name             Wound 06/01/22 0907 Right anterior knee Incision    Wound - Properties Group Placement Date: 06/01/22 -HG Placement Time: 0907 -HG Present on Hospital Admission: N  -HG Side: Right  -HG Orientation: anterior  -HG Location: knee  -HG Primary Wound Type: Incision  -HG Additional Comments: Surgical Site  -HG     Retired Wound - Properties Group Placement Date: 06/01/22  -HG Placement Time: 0907 -HG Present on Hospital Admission: N  -HG Side: Right  -HG Orientation: anterior  -HG Location: knee  -HG Primary Wound Type: Incision  -HG Additional Comments: Surgical Site  -HG     Retired Wound - Properties Group Date first assessed: 06/01/22 -HG Time first assessed: 0907 -HG Present on Hospital Admission: N  -HG Side: Right  -HG Location: knee  -HG Primary Wound Type: Incision  -HG Additional Comments: Surgical Site  -HG     Row Name 06/02/22 1449 06/02/22 1232       Progress Summary (PT)    Progress Toward Functional Goals (PT) progress toward functional goals is good  -RH progress toward functional goals is good  -RH          User Key  (r) = Recorded By, (t) = Taken By, (c) = Cosigned By    Initials Name Provider Type     Rich Carreno, RN Registered Nurse    RH Sherman Mckinney PTA Physical Therapist Assistant                Right Knee Ther-ex   Exercise  Reps  Sets    Long arc Quads   10 2   Short arc Quads  10 2   Heel Slides  10 2   Ankle Pumps  10 2   Quad sets  10 2   Glut sets  10 2   Straight leg raise  10 2          Physical Therapy Education                 Title: PT OT SLP Therapies (Resolved)     Topic: Physical Therapy (Resolved)     Point: Mobility training (Resolved)     Learning Progress Summary           Patient Acceptance, E,D, DU by KALEIGH at 6/2/2022 0927    Acceptance, E, VU by VELMA at 6/1/2022 1439                   Point: Precautions (Resolved)     Learning Progress Summary           Patient Acceptance, E,D, DU by PG at  6/2/2022 0927    Acceptance, E, VU by VELMA at 6/1/2022 1439                               User Key     Initials Effective Dates Name Provider Type Discipline    PG 06/16/21 -  Migue Almaguer OT Occupational Therapist OT    VELMA 06/03/21 -  Shai Ramirez, PT Physical Therapist PT              PT Recommendation and Plan     Progress Summary (PT)  Progress Toward Functional Goals (PT): progress toward functional goals is good   Outcome Measures     Row Name 06/02/22 1200 06/01/22 1400          How much help from another person do you currently need...    Turning from your back to your side while in flat bed without using bedrails? 4  -RH 4  -VELMA     Moving from lying on back to sitting on the side of a flat bed without bedrails? 4  -RH 4  -VELMA     Moving to and from a bed to a chair (including a wheelchair)? 3  -RH 3  -VELMA     Standing up from a chair using your arms (e.g., wheelchair, bedside chair)? 4  -RH 4  -VELMA     Climbing 3-5 steps with a railing? 4  -RH 3  -VELMA     To walk in hospital room? 4  -RH 3  -VELMA     AM-PAC 6 Clicks Score (PT) 23  -RH 21  -VELMA           User Key  (r) = Recorded By, (t) = Taken By, (c) = Cosigned By    Initials Name Provider Type    RH Sherman Mckinney PTA Physical Therapist Assistant    Shai Webber, PT Physical Therapist                 Time Calculation:    PT Charges     Row Name 06/02/22 1448 06/02/22 1231          Time Calculation    PT Received On 06/02/22  -RH 06/02/22  -RH            Timed Charges    38357 - Gait Training Minutes  8  -RH 9  -RH     87024 - PT Therapeutic Activity Minutes 3  -RH 4  -RH            Untimed Charges    PT Group Therapy Minutes 20  -RH 35  -RH            Total Minutes    Timed Charges Total Minutes 11  -RH 13  -RH     Untimed Charges Total Minutes 20  -RH 35  -RH      Total Minutes 31  -RH 48  -RH           User Key  (r) = Recorded By, (t) = Taken By, (c) = Cosigned By    Initials Name Provider Type    RH Sherman Mckinney PTA Physical Therapist Assistant               Therapy Charges for Today     Code Description Service Date Service Provider Modifiers Qty    19416836136 HC GAIT TRAINING EA 15 MIN 6/2/2022 Sherman Mckinney, PTA GP 1    67403288262 HC PT THER PROC GROUP 6/2/2022 Sherman Mckinney, PTA GP 1    55400146158 HC GAIT TRAINING EA 15 MIN 6/2/2022 Sherman Mckinney, PTA GP 1    89948974697 HC PT THER PROC GROUP 6/2/2022 Sherman Mckinney, MAHESH GP 1          PT G-Codes  Outcome Measure Options: AM-PAC 6 Clicks Daily Activity (OT), Optimal Instrument  AM-PAC 6 Clicks Score (PT): 23  AM-PAC 6 Clicks Score (OT): 19    Sherman Mckinney PTA  6/2/2022

## 2022-06-02 NOTE — PLAN OF CARE
Goal Outcome Evaluation:  Plan of Care Reviewed With: patient        Progress: no change  Outcome Evaluation: pt. medicated with scheduled pain medication only with relief noted.  pt. is a one person assist with walker.  pt. had not voided since his admit to the floor, bladder scanned pt. after he voided 50 mls.  with 905 mls showing remaining in the bladder, pt. stated he did not feel like he needed to void. straight cathed with 750 mls obtained at 2310.  pt. has since been able to void on his own.

## 2022-06-02 NOTE — THERAPY EVALUATION
Patient Name: Christopher Finley  : 1950    MRN: [id number]                              Today's Date: 2022       Admit Date: 2022    Visit Dx:     ICD-10-CM ICD-9-CM   1. Difficulty in walking  R26.2 719.7   2. Primary osteoarthritis of right knee  M17.11 715.16   3. Osteoarthritis of right knee, unspecified osteoarthritis type  M17.11 715.96   4. Decreased activities of daily living (ADL)  Z78.9 V49.89     Patient Active Problem List   Diagnosis   • Osteoarthritis of left elbow   • Lateral epicondylitis of left elbow   • Primary osteoarthritis of right knee   • Right knee pain   • Osteoarthritis of right knee   • Calculus of gallbladder with acute on chronic cholecystitis without obstruction   • Generalized abdominal pain   • Weight loss   • Dark stools   • Malaise and fatigue   • Osteoarthritis of right knee, unspecified osteoarthritis type   • Verbalizes suicidal thoughts     Past Medical History:   Diagnosis Date   • Acid reflux    • Anxiety    • Arthritis     generalized,  right knee   • Cancer (HCC)     RIGHT CHEEK SKIN CANCER REMOVED   • Chronic post-traumatic stress disorder (PTSD)    • Claustrophobia    • Depression    • Diabetes mellitus (HCC)     TYPE 2, BS IN THE A.M.    • GERD (gastroesophageal reflux disease)    • Hyperlipemia    • Hypertension    • Left elbow pain 2018   • Left shoulder pain 2018   • Left wrist pain 2018   • Mood disorder (HCC)    • Sleep apnea     cpap    • Suicide ideation     PT STATES SUFFERS FROM PTSD AND BECOMES SEVERELY ANXIOUS AND DEPRESSED, FOLLOWS W/ COUNSELOR AND TAKES MEDS TO HELP     Past Surgical History:   Procedure Laterality Date   • CHOLECYSTECTOMY N/A 2022    Procedure: CHOLECYSTECTOMY LAPAROSCOPIC;  Surgeon: Abdulkadir Sol MD;  Location: [facility] OR [facility] [place];  Service: General;  Laterality: N/A;   • COLONOSCOPY     • COLONOSCOPY N/A 02/10/2022    Procedure: COLONOSCOPY with random biopsies, polypectomy with cold snare;   Surgeon: Lamont Hargrove MD;  Location: [facility] ENDOSCOPY;  Service: Gastroenterology;  Laterality: N/A;  diverticulosis, colon polyps    • CYSTOSCOPY     • ENDOSCOPY N/A 02/10/2022    Procedure: ESOPHAGOGASTRODUODENOSCOPY with biopsy;  Surgeon: Lamont Hargrove MD;  Location: [facility] ENDOSCOPY;  Service: Gastroenterology;  Laterality: N/A;  hiatal hernia   • HAND SURGERY      left thumb repair   • HEMORRHOIDECTOMY      BANDED, 2004,2006, 2007, 2020   • INGUINAL HERNIA REPAIR  1998    RIGHT   • LACERATION REPAIR      HEAD, AGE 18   • MOUTH SURGERY Right     CYST REMOVED LOWER RIGHT JAW   • PROSTATE BIOPSY     • SIGMOIDOSCOPY     • SKIN CANCER EXCISION      right cheek   • TENDON REPAIR      LEFT HAND   • UPPER GASTROINTESTINAL ENDOSCOPY        General Information     Row Name 06/02/22 0927 06/02/22 0921       OT Time and Intention    Document Type therapy note (daily note)  -PG evaluation  -PG    Mode of Treatment individual therapy;occupational therapy  -PG individual therapy;occupational therapy  -PG    Row Name 06/02/22 0921          General Information    Patient Profile Reviewed yes  -PG     Prior Level of Function independent:;gait;transfer;ADL's  -PG     Existing Precautions/Restrictions fall  -PG     Barriers to Rehab none identified  -PG     Row Name 06/02/22 0921          Occupational Profile    Reason for Services/Referral (Occupational Profile) Patient is a pleasant 71-year-old male admitted for an elective right total knee replacement.  No prior OT services indicated.  Patient is being evaluated to assess ADL performance and help facilitate any discharge needs.  -PG     Row Name 06/02/22 0921          Living Environment    People in Home spouse  -PG     Row Name 06/02/22 0921          Safety Issues, Functional Mobility    Safety Issues Affecting Function (Mobility) ability to follow commands;awareness of need for assistance  -PG     Impairments Affecting Function (Mobility)  balance;endurance/activity tolerance;strength  -PG           User Key  (r) = Recorded By, (t) = Taken By, (c) = Cosigned By    Initials Name Provider Type    PG Migue Almaguer OT Occupational Therapist                 Mobility/ADL's     Row Name 06/02/22 0927 06/02/22 0922       Transfers    Transfers sit-stand transfer;bed-chair transfer  -PG sit-stand transfer;bed-chair transfer  -PG    Bed-Chair [place] (Transfers) verbal cues;contact guard  -PG verbal cues;contact guard  -PG    Assistive Device (Bed-Chair Transfers) walker, front-wheeled  -PG walker, front-wheeled  -PG    Sit-Stand [place] (Transfers) contact guard;verbal cues  -PG contact guard;verbal cues  -PG    Row Name 06/02/22 0927 06/02/22 0922       Sit-Stand Transfer    Assistive Device (Sit-Stand Transfers) walker, front-wheeled  -PG walker, front-wheeled  -PG    Row Name 06/02/22 0922          Activities of Daily Living    BADL Assessment/Intervention bathing;upper body dressing;lower body dressing;grooming;toileting  -PG     Row Name 06/02/22 0927 06/02/22 0922       Bathing Assessment/Intervention    [place] Level (Bathing) bathing skills;contact guard assist  -PG bathing skills;contact guard assist  -PG    Row Name 06/02/22 0927 06/02/22 0922       Upper Body Dressing Assessment/Training    [place] Level (Upper Body Dressing) upper body dressing skills;set up  -PG upper body dressing skills;set up  -PG    Row Name 06/02/22 0927 06/02/22 0922       Lower Body Dressing Assessment/Training    [place] Level (Lower Body Dressing) lower body dressing skills;minimum assist (75% patient effort);verbal cues  -PG lower body dressing skills;minimum assist (75% patient effort);verbal cues  -PG    Position (Lower Body Dressing) -- supported standing  -PG    Row Name 06/02/22 0927 06/02/22 0922       Grooming Assessment/Training    [place] Level (Grooming) grooming skills;set up  -PG grooming skills;set up  -PG    Row Name 06/02/22  0927 06/02/22 0922       Toileting Assessment/Training    [place] Level (Toileting) toileting skills;verbal cues;contact guard assist  -PG toileting skills;verbal cues;contact guard assist  -PG    Position (Toileting) supported standing  -PG supported standing  -PG          User Key  (r) = Recorded By, (t) = Taken By, (c) = Cosigned By    Initials Name Provider Type    PG Migue Almaguer OT Occupational Therapist               Obj/Interventions     [facility] Name 06/02/22 0924          Sensory Assessment (Somatosensory)    Sensory Assessment (Somatosensory) sensation intact  -PG     Row Name 06/02/22 0924          Vision Assessment/Intervention    Visual Impairment/Limitations WFL  -PG     Row Name 06/02/22 0924          Range of Motion Comprehensive    General Range of Motion no range of motion deficits identified  -PG     [facility] Name 06/02/22 0924          Strength Comprehensive (MMT)    General Manual Muscle Testing (MMT) Assessment no strength deficits identified  -PG     [facility] Name 06/02/22 0924          Motor Skills    Motor Skills coordination;functional endurance  -PG     Coordination WFL  -PG     Functional Endurance Fair  -PG           User Key  (r) = Recorded By, (t) = Taken By, (c) = Cosigned By    Initials Name Provider Type    PG Migue Almaguer OT Occupational Therapist               Goals/Plan     [facility] Name 06/02/22 0925          Transfer Goal 1 (OT)    Activity/Assistive Device (Transfer Goal 1, OT) transfers, all  -PG     [place] Level/Cues Needed (Transfer Goal 1, OT) modified independence  -PG     Time Frame (Transfer Goal 1, OT) long term goal (LTG);10 days  -PG     [facility] Name 06/02/22 0925          Bathing Goal 1 (OT)    Activity/Device (Bathing Goal 1, OT) bathing skills, all  -PG     [place] Level/Cues Needed (Bathing Goal 1, OT) modified independence  -PG     Time Frame (Bathing Goal 1, OT) long term goal (LTG);10 days  -PG     [facility] Name 06/02/22 0925          Dressing Goal 1 (OT)     Activity/Device (Dressing Goal 1, OT) dressing skills, all  -PG     [place]/Cues Needed (Dressing Goal 1, OT) modified independence  -PG     Time Frame (Dressing Goal 1, OT) long term goal (LTG);10 days  -PG     Row Name 06/02/22 0925          Toileting Goal 1 (OT)    Activity/Device (Toileting Goal 1, OT) toileting skills, all  -PG     [place] Level/Cues Needed (Toileting Goal 1, OT) modified independence  -PG     Time Frame (Toileting Goal 1, OT) long term goal (LTG);10 days  -PG     Row Name 06/02/22 0925          Grooming Goal 1 (OT)    Activity/Device (Grooming Goal 1, OT) grooming skills, all  -PG     [place] (Grooming Goal 1, OT) modified independence  -PG     Time Frame (Grooming Goal 1, OT) long term goal (LTG);10 days  -PG     Row Name 06/02/22 0925          Therapy Assessment/Plan (OT)    Planned Therapy Interventions (OT) activity tolerance training;BADL retraining;occupation/activity based interventions;patient/caregiver education/training;transfer/mobility retraining;strengthening exercise  -PG           User Key  (r) = Recorded By, (t) = Taken By, (c) = Cosigned By    Initials Name Provider Type    PG Migue Almaguer, OT Occupational Therapist               Clinical Impression     Row Name 06/02/22 0924          Pain Assessment    Pretreatment Pain Rating 2/10  -PG     Posttreatment Pain Rating 2/10  -PG     Pain Location - Side/Orientation Right  -PG     Pain Location - knee  -PG     Row Name 06/02/22 0924          Plan of Care Review    Plan of Care Reviewed With patient  -PG     Progress no change  -PG     Outcome Evaluation Patient presents with limitations affecting strength, activity tolerance, and balance impacting patient's ability to return home safely and independently.  The skills of a therapist will be required to safely and effectively implement the following treatment plan to restore maximal level of function  -PG     Row Name 06/02/22 0924          Therapy Assessment/Plan  (OT)    Patient/Family Therapy Goal Statement (OT) Patient would like to return home independently and be able to walk his dog again  -PG     Rehab Potential (OT) good, to achieve stated therapy goals  -PG     Criteria for Skilled Therapeutic Interventions Met (OT) yes;meets criteria;skilled treatment is necessary  -PG     Therapy Frequency (OT) 5 times/wk  -PG     Row Name 06/02/22 0924          Therapy Plan Review/Discharge Plan (OT)    Anticipated Discharge Disposition (OT) home with home health  -PG           User Key  (r) = Recorded By, (t) = Taken By, (c) = Cosigned By    Initials Name Provider Type    PG Migue Almaguer OT Occupational Therapist               Outcome Measures     Row Name 06/02/22 0926          How much help from another is currently needed...    Putting on and taking off regular lower body clothing? 3  -PG     Bathing (including washing, rinsing, and drying) 3  -PG     Toileting (which includes using toilet bed pan or urinal) 3  -PG     Putting on and taking off regular upper body clothing 3  -PG     Taking care of personal grooming (such as brushing teeth) 3  -PG     Eating meals 4  -PG     AM-PAC 6 Clicks Score (OT) 19  -PG     Row Name 06/02/22 0926          Functional Assessment    Outcome Measure Options AM-PAC 6 Clicks Daily Activity (OT);Optimal Instrument  -PG     Row Name 06/02/22 0926          Optimal Instrument    Optimal Instrument Optimal - 3  -PG     Bending/Stooping 2  -PG     Standing 2  -PG     Reaching 1  -PG     From the list, choose the 3 activities you would most like to be able to do without any difficulty Bending/stooping;Standing;Reaching  -PG     Total Score Optimal - 3 5  -PG           User Key  (r) = Recorded By, (t) = Taken By, (c) = Cosigned By    Initials Name Provider Type    PG Migue Almaguer OT Occupational Therapist                Occupational Therapy Education                 Title: PT OT SLP Therapies (Done)     Topic: Occupational Therapy (Done)     Point:  ADL training (Done)     Description:   Instruct learner(s) on proper safety adaptation and remediation techniques during self care or transfers.   Instruct in proper use of assistive devices.              Learning Progress Summary           Patient Acceptance, E,D, DU by PG at 6/2/2022 0927                   Point: Home exercise program (Done)     Description:   Instruct learner(s) on appropriate technique for monitoring, assisting and/or progressing therapeutic exercises/activities.              Learning Progress Summary           Patient Acceptance, E,D, DU by PG at 6/2/2022 0927                   Point: Precautions (Done)     Description:   Instruct learner(s) on prescribed precautions during self-care and functional transfers.              Learning Progress Summary           Patient Acceptance, E,D, DU by PG at 6/2/2022 0927                   Point: Body mechanics (Done)     Description:   Instruct learner(s) on proper positioning and spine alignment during self-care, functional mobility activities and/or exercises.              Learning Progress Summary           Patient Acceptance, E,D, DU by PG at 6/2/2022 0927                               User Key     Initials Effective Dates Name Provider Type Discipline     06/16/21 -  Migue Almaguer OT Occupational Therapist OT              OT Recommendation and Plan  Planned Therapy Interventions (OT): activity tolerance training, BADL retraining, occupation/activity based interventions, patient/caregiver education/training, transfer/mobility retraining, strengthening exercise  Therapy Frequency (OT): 5 times/wk  Plan of Care Review  Plan of Care Reviewed With: patient  Progress: no change  Outcome Evaluation: Patient presents with limitations affecting strength, activity tolerance, and balance impacting patient's ability to return home safely and independently.  The skills of a therapist will be required to safely and effectively implement the following treatment  plan to restore maximal level of function     Time Calculation:    Time Calculation- OT     Row Name 06/02/22 0929             Time Calculation- OT    OT Received On 06/02/22  -PG      OT Goal Re-Cert Due Date 06/11/22  -PG              Timed Charges    73452 - OT Therapeutic Activity Minutes 10  -PG      80165 - OT Self Care/Mgmt Minutes 15  -PG              Untimed Charges    OT Eval/Re-eval Minutes 25  -PG              Total Minutes    Timed Charges Total Minutes 25  -PG      Untimed Charges Total Minutes 25  -PG       Total Minutes 50  -PG            User Key  (r) = Recorded By, (t) = Taken By, (c) = Cosigned By    Initials Name Provider Type    PG Migue Almaguer OT Occupational Therapist              Therapy Charges for Today     Code Description Service Date Service Provider Modifiers Qty    12597530542 HC OT THERAPEUTIC ACT EA 15 MIN 6/2/2022 Migue Almaguer OT GO 1    13036998155 HC OT SELF CARE/MGMT/TRAIN EA 15 MIN 6/2/2022 Migue Almaguer OT GO 1    28974964307 HC OT EVAL LOW COMPLEXITY 2 6/2/2022 Migue Almaguer OT GO 1               Migue Almaguer OT  6/2/2022

## 2022-06-02 NOTE — DISCHARGE SUMMARY
[facility]         DISCHARGE SUMMARY    Patient Name: Christopher Finley  : 1950  MRN: [id number]    Date of Admission: 2022  Date of Discharge:   Primary Care Physician: Samy Wing MD    Consults     Date and Time Order Name Status Description    2022  1:20 PM Inpatient Psychiatrist Consult Completed     2022 11:26 AM Inpatient Hospitalist Consult            Presenting Problem:   Primary osteoarthritis of right knee [M17.11]  Osteoarthritis of right knee, unspecified osteoarthritis type [M17.11]    Active and Resolved Hospital Problems:  Active Hospital Problems    Diagnosis POA   • Osteoarthritis of right knee, unspecified osteoarthritis type [M17.11] Yes      Resolved Hospital Problems    Diagnosis POA   • **Primary osteoarthritis of right knee [M17.11] Yes   • Verbalizes suicidal thoughts [R45.851] Not Applicable         Hospital Course     Hospital Course:  Christopher Finley is a 71 y.o. male admitted for total knee replacement electively.  Patient had chronic right knee pain and difficulty walking.  Post surgery patient was admitted to medical service for observation.  Further patient had some suicidal thought and ideation according to nursing staff and orthopedic surgeon put him on a close watch.    Patient was examined and he had suicidal ideation several months ago and has some underlying depression being treated.  Psychiatrist was consulted and he cleared patient as there was no acute issues and imminent threat.  Patient participated in therapy today he is feeling better he feels comfortable going home he will have outpatient therapy.        DISCHARGE Follow Up Recommendations for labs and diagnostics:   Discharge to home with outpatient follow-up      Day of Discharge     Vital Signs:  Temp:  [97.3 °F (36.3 °C)-98.6 °F (37 °C)] 98.6 °F (37 °C)  Heart Rate:  [54-87] 59  Resp:  [16-20] 18  BP: (115-134)/(54-64) 132/55  Flow (L/min):  [2] 2    Physical  Exam:    *Elderly male not in acute distress  Heart regular lungs clear  Right lower extremity without significant edema, surgical site with dressing and no drainage, neurovascular bundle intact in the lower extremity      Pertinent  and/or Most Recent Results     LAB RESULTS:      Lab 06/02/22  0425 05/31/22  0844   WBC  --  7.69   HEMOGLOBIN 11.8* 13.9   HEMATOCRIT 34.2* 40.9   PLATELETS  --  166   NEUTROS ABS  --  5.05   IMMATURE GRANS (ABS)  --  0.02   LYMPHS ABS  --  1.87   MONOS ABS  --  0.65   EOS ABS  --  0.07   MCV  --  91.3   PROTIME  --  13.9         Lab 05/31/22  0844   SODIUM 143   POTASSIUM 3.9   CHLORIDE 106   CO2 25.7   ANION GAP 11.3   BUN 13   CREATININE 0.83   EGFR 93.0   GLUCOSE 114*   CALCIUM 9.2   HEMOGLOBIN A1C 5.80*         Lab 05/31/22  0844   TOTAL PROTEIN 6.7   ALBUMIN 4.10   GLOBULIN 2.6   ALT (SGPT) 19   AST (SGOT) 16   BILIRUBIN 0.3   ALK PHOS 102         Lab 05/31/22  0844   PROTIME 13.9   INR 1.06                 Brief Urine Lab Results  (Last result in the past 365 days)      Color   Clarity   Blood   Leuk Est   Nitrite   Protein   CREAT   Urine HCG        01/22/22 1118 Yellow   Clear   Negative   Negative   Negative   Negative               Microbiology Results (last 10 days)     ** No results found for the last 240 hours. **          PROCEDURES:    [unfilled]    XR Knee 1 or 2 View Right    Result Date: 6/1/2022  Impression:  Status post total knee arthroplasty in near anatomic alignment, with no evidence of immediate complication.      JONNIE PEDRO MD       Electronically Signed and Approved By: JONNIE PEDRO MD on 6/01/2022 at 11:37                           Labs Pending at Discharge:        Discharge Details        Discharge Medications      New Medications      Instructions Start Date   apixaban 2.5 MG tablet tablet  Commonly known as: ELIQUIS   2.5 mg, Oral, 2 Times Daily      HYDROcodone-acetaminophen 7.5-325 MG per tablet  Commonly known as: Norco   1-2 tablets, Oral,  Every 4 Hours PRN         Changes to Medications      Instructions Start Date   aspirin  MG tablet  Commonly known as: Ecotrin  What changed:   · medication strength  · how much to take  · These instructions start on Arabella 10, 2022. If you are unsure what to do until then, ask your doctor or other care provider.   Take 1 tablet by mouth Daily (start after finishing eliquis)   Start Date: Arabella 10, 2022        Continue These Medications      Instructions Start Date   buPROPion  MG 12 hr tablet  Commonly known as: WELLBUTRIN SR   75 mg, Oral, Daily      carBAMazepine 200 MG tablet  Commonly known as: TEGretol   200 mg, Oral, Nightly      carboxymethylcellulose 0.5 % solution  Commonly known as: REFRESH PLUS   1 drop, Both Eyes, As Needed      cyproheptadine 4 MG tablet  Commonly known as: PERIACTIN   4 mg, Oral, Nightly      dicyclomine 10 MG capsule  Commonly known as: BENTYL   10 mg, Oral, Every 6 Hours PRN      dilTIAZem  MG 24 hr capsule  Commonly known as: CARDIZEM CD   180 mg, Oral, Nightly      escitalopram 20 MG tablet  Commonly known as: LEXAPRO   20 mg, Oral, Nightly, Currently out of but planning on starting back up      Hydrocortisone (Perianal) 2.5 % rectal cream  Commonly known as: ANUSOL-HC   1 application, Rectal, As Needed      metFORMIN 500 MG tablet  Commonly known as: GLUCOPHAGE   250 mg, Oral, 2 Times Daily With Meals, 1/2 tab      pantoprazole 40 MG EC tablet  Commonly known as: PROTONIX   40 mg, Oral, Daily      rosuvastatin 40 MG tablet  Commonly known as: CRESTOR   40 mg, Oral, Nightly      vitamin D3 125 MCG (5000 UT) capsule capsule   5,000 Units, Oral, Daily         Stop These Medications    lisinopril 10 MG tablet  Commonly known as: PRINIVIL,ZESTRIL            Allergies   Allergen Reactions   • Amoxicillin Hives and Rash     Other reaction(s): rash         Discharge Disposition:    Home-Health Care c    Diet:  Heart healthy      Discharge Activity:     Activity  Instructions     Activity as Tolerated      Discharge Activity      Weightbearing as tolerated with walker  Outpatient physical therapy  Begin Ecotrin after Eliquis completed  Dressing change postoperative day #3 to new Aquacel.  Then leave 5 to 7 days.  May shower with dressing in place  Call with any problems  Cold therapy  Ice and elevate extremity            Future Appointments   Date Time Provider Department Center   6/16/2022  9:30 AM Rio Arce PA [facility] [place] [facility]       Additional Instructions for the Follow-ups that You Need to Schedule     Discharge Follow-up with PCP   As directed       Currently Documented PCP:    Samy Wing MD    PCP Phone Number:    [phone]     Follow Up Details: in 1- 2 weeks         Discharge Follow-up with Specified Provider: Dr Russo as recomended   As directed      To: Dr Russo as recomended         Discharge Follow-up with Specified Provider: Rio Arce PA-C; 2 Weeks   As directed      To: Rio Arce PA-C    Follow Up: 2 Weeks               Time spent on Discharge including face to face service: 21 minutes.            I have dictated this note utilizing Dragon Dictation.             Please note that portions of this note were completed with a voice recognition program.             Part of this note may be an electronic transcription/translation of spoken language to printed text         using the Dragon Dictation System.       Electronically signed by Stanford Cook MD, 06/02/22, 1:40 PM EDT.

## 2022-06-02 NOTE — PLAN OF CARE
Goal Outcome Evaluation:   Pt has remained stable with no new changes. Pt being discharged home this afternoon.

## 2022-06-03 ENCOUNTER — HOSPITAL ENCOUNTER (EMERGENCY)
Facility: HOSPITAL | Age: 72
Discharge: HOME | End: 2022-06-03
Attending: EMERGENCY MEDICINE | Admitting: EMERGENCY MEDICINE

## 2022-06-03 VITALS
RESPIRATION RATE: 18 BRPM | SYSTOLIC BLOOD PRESSURE: 132 MMHG | TEMPERATURE: 98.6 F | BODY MASS INDEX: 32.98 KG/M2 | OXYGEN SATURATION: 98 % | WEIGHT: 210.1 LBS | HEART RATE: 59 BPM | HEIGHT: 67 IN | DIASTOLIC BLOOD PRESSURE: 55 MMHG

## 2022-06-03 VITALS
DIASTOLIC BLOOD PRESSURE: 76 MMHG | HEART RATE: 54 BPM | OXYGEN SATURATION: 99 % | RESPIRATION RATE: 18 BRPM | SYSTOLIC BLOOD PRESSURE: 163 MMHG | BODY MASS INDEX: 34.83 KG/M2 | TEMPERATURE: 98.1 F | HEIGHT: 64 IN | WEIGHT: 204 LBS

## 2022-06-03 DIAGNOSIS — A49.9 BACTERIAL UTI: Primary | ICD-10-CM

## 2022-06-03 DIAGNOSIS — N39.0 BACTERIAL UTI: Primary | ICD-10-CM

## 2022-06-03 LAB
ALBUMIN SERPL-MCNC: 4 G/DL (ref 3.5–5.2)
ALBUMIN/GLOB SERPL: 1.5 G/DL
ALP SERPL-CCNC: 106 U/L (ref 39–117)
ALT SERPL W P-5'-P-CCNC: 36 U/L (ref 1–41)
ANION GAP SERPL CALCULATED.3IONS-SCNC: 11.4 MMOL/L (ref 5–15)
AST SERPL-CCNC: 35 U/L (ref 1–40)
BASOPHILS # BLD AUTO: 0.03 10*3/MM3 (ref 0–0.2)
BASOPHILS NFR BLD AUTO: 0.3 % (ref 0–1.5)
BILIRUB SERPL-MCNC: 0.4 MG/DL (ref 0–1.2)
BILIRUB UR QL STRIP: NEGATIVE
BUN SERPL-MCNC: 15 MG/DL (ref 8–23)
BUN/CREAT SERPL: 17.6 (ref 7–25)
CALCIUM SPEC-SCNC: 9.6 MG/DL (ref 8.6–10.5)
CHLORIDE SERPL-SCNC: 103 MMOL/L (ref 98–107)
CLARITY UR: CLEAR
CO2 SERPL-SCNC: 23.6 MMOL/L (ref 22–29)
COLOR UR: YELLOW
CREAT SERPL-MCNC: 0.85 MG/DL (ref 0.76–1.27)
DEPRECATED RDW RBC AUTO: 41.4 FL (ref 37–54)
EGFRCR SERPLBLD CKD-EPI 2021: 92.3 ML/MIN/1.73
EOSINOPHIL # BLD AUTO: 0.23 10*3/MM3 (ref 0–0.4)
EOSINOPHIL NFR BLD AUTO: 2.2 % (ref 0.3–6.2)
ERYTHROCYTE [DISTWIDTH] IN BLOOD BY AUTOMATED COUNT: 12.6 % (ref 12.3–15.4)
GLOBULIN UR ELPH-MCNC: 2.7 GM/DL
GLUCOSE SERPL-MCNC: 105 MG/DL (ref 65–99)
GLUCOSE UR STRIP-MCNC: NEGATIVE MG/DL
HCT VFR BLD AUTO: 37.4 % (ref 37.5–51)
HGB BLD-MCNC: 12.9 G/DL (ref 13–17.7)
HGB UR QL STRIP.AUTO: NEGATIVE
HOLD SPECIMEN: NORMAL
IMM GRANULOCYTES # BLD AUTO: 0.06 10*3/MM3 (ref 0–0.05)
IMM GRANULOCYTES NFR BLD AUTO: 0.6 % (ref 0–0.5)
KETONES UR QL STRIP: NEGATIVE
LEUKOCYTE ESTERASE UR QL STRIP.AUTO: NEGATIVE
LYMPHOCYTES # BLD AUTO: 1.94 10*3/MM3 (ref 0.7–3.1)
LYMPHOCYTES NFR BLD AUTO: 18.5 % (ref 19.6–45.3)
MCH RBC QN AUTO: 31.5 PG (ref 26.6–33)
MCHC RBC AUTO-ENTMCNC: 34.5 G/DL (ref 31.5–35.7)
MCV RBC AUTO: 91.2 FL (ref 79–97)
MONOCYTES # BLD AUTO: 1.34 10*3/MM3 (ref 0.1–0.9)
MONOCYTES NFR BLD AUTO: 12.8 % (ref 5–12)
NEUTROPHILS NFR BLD AUTO: 6.86 10*3/MM3 (ref 1.7–7)
NEUTROPHILS NFR BLD AUTO: 65.6 % (ref 42.7–76)
NITRITE UR QL STRIP: NEGATIVE
NRBC BLD AUTO-RTO: 0 /100 WBC (ref 0–0.2)
NT-PROBNP SERPL-MCNC: 574.4 PG/ML (ref 0–900)
PH UR STRIP.AUTO: 6 [PH] (ref 5–8)
PLATELET # BLD AUTO: 163 10*3/MM3 (ref 140–450)
PMV BLD AUTO: 9.6 FL (ref 6–12)
POTASSIUM SERPL-SCNC: 3.8 MMOL/L (ref 3.5–5.2)
PROT SERPL-MCNC: 6.7 G/DL (ref 6–8.5)
PROT UR QL STRIP: NEGATIVE
RBC # BLD AUTO: 4.1 10*6/MM3 (ref 4.14–5.8)
SODIUM SERPL-SCNC: 138 MMOL/L (ref 136–145)
SP GR UR STRIP: 1.01 (ref 1–1.03)
UROBILINOGEN UR QL STRIP: NORMAL
WBC NRBC COR # BLD: 10.46 10*3/MM3 (ref 3.4–10.8)
WHOLE BLOOD HOLD COAG: NORMAL

## 2022-06-03 PROCEDURE — 85025 COMPLETE CBC W/AUTO DIFF WBC: CPT | Performed by: EMERGENCY MEDICINE

## 2022-06-03 PROCEDURE — 80053 COMPREHEN METABOLIC PANEL: CPT | Performed by: EMERGENCY MEDICINE

## 2022-06-03 PROCEDURE — 36415 COLL VENOUS BLD VENIPUNCTURE: CPT

## 2022-06-03 PROCEDURE — 81003 URINALYSIS AUTO W/O SCOPE: CPT | Performed by: EMERGENCY MEDICINE

## 2022-06-03 PROCEDURE — 99283 EMERGENCY DEPT VISIT LOW MDM: CPT

## 2022-06-03 PROCEDURE — 83880 ASSAY OF NATRIURETIC PEPTIDE: CPT | Performed by: EMERGENCY MEDICINE

## 2022-06-03 RX ORDER — SULFAMETHOXAZOLE AND TRIMETHOPRIM 800; 160 MG/1; MG/1
1 TABLET ORAL 2 TIMES DAILY
Qty: 20 TABLET | Refills: 0 | Status: SHIPPED | OUTPATIENT
Start: 2022-06-03 | End: 2022-06-03 | Stop reason: SDUPTHER

## 2022-06-03 RX ORDER — SULFAMETHOXAZOLE AND TRIMETHOPRIM 800; 160 MG/1; MG/1
1 TABLET ORAL 2 TIMES DAILY
Qty: 20 TABLET | Refills: 0 | Status: SHIPPED | OUTPATIENT
Start: 2022-06-03

## 2022-06-03 NOTE — DISCHARGE INSTRUCTIONS
Drink 64 oz of water.  No cokes, coffee, tea.  Take antibiotics as prescribed.  Return to ER if you develop a fever 100.4 or higher or worsen.

## 2022-06-03 NOTE — THERAPY DISCHARGE NOTE
Inpatient Rehabilitation - Occupational Therapy Discharge   [facility]    Patient Name: Christopher Finley  : 1950    MRN: [id number]                              Today's Date: 6/3/2022       Admit Date: 2022    Visit Dx:     ICD-10-CM ICD-9-CM   1. Difficulty in walking  R26.2 719.7   2. Primary osteoarthritis of right knee  M17.11 715.16   3. Osteoarthritis of right knee, unspecified osteoarthritis type  M17.11 715.96   4. Decreased activities of daily living (ADL)  Z78.9 V49.89     Patient Active Problem List   Diagnosis   • Osteoarthritis of left elbow   • Lateral epicondylitis of left elbow   • Right knee pain   • Osteoarthritis of right knee   • Calculus of gallbladder with acute on chronic cholecystitis without obstruction   • Generalized abdominal pain   • Weight loss   • Dark stools   • Malaise and fatigue   • Osteoarthritis of right knee, unspecified osteoarthritis type     Past Medical History:   Diagnosis Date   • Acid reflux    • Anxiety    • Arthritis     generalized,  right knee   • Cancer (HCC)     RIGHT CHEEK SKIN CANCER REMOVED   • Chronic post-traumatic stress disorder (PTSD)    • Claustrophobia    • Depression    • Diabetes mellitus (HCC)     TYPE 2, BS IN THE A.M.    • GERD (gastroesophageal reflux disease)    • Hyperlipemia    • Hypertension    • Left elbow pain 2018   • Left shoulder pain 2018   • Left wrist pain 2018   • Mood disorder (HCC)    • Sleep apnea     cpap    • Suicide ideation     PT STATES SUFFERS FROM PTSD AND BECOMES SEVERELY ANXIOUS AND DEPRESSED, FOLLOWS W/ COUNSELOR AND TAKES MEDS TO HELP     Past Surgical History:   Procedure Laterality Date   • CHOLECYSTECTOMY N/A 2022    Procedure: CHOLECYSTECTOMY LAPAROSCOPIC;  Surgeon: Abdulkadir Sol MD;  [place] [facility] [place] [facility] [place]  Service: General;  Laterality: N/A;   • COLONOSCOPY     • COLONOSCOPY N/A 02/10/2022    Procedure: COLONOSCOPY with random biopsies, polypectomy with cold snare;   Surgeon: Lamont Hargrove MD;  Location: [facility] ENDOSCOPY;  Service: Gastroenterology;  Laterality: N/A;  diverticulosis, colon polyps    • CYSTOSCOPY     • ENDOSCOPY N/A 02/10/2022    Procedure: ESOPHAGOGASTRODUODENOSCOPY with biopsy;  Surgeon: Lamont Hargrove MD;  Location: [facility] ENDOSCOPY;  Service: Gastroenterology;  Laterality: N/A;  hiatal hernia   • HAND SURGERY      left thumb repair   • HEMORRHOIDECTOMY      BANDED, 2004,2006, 2007, 2020   • INGUINAL HERNIA REPAIR  1998    RIGHT   • LACERATION REPAIR      HEAD, AGE 18   • MOUTH SURGERY Right     CYST REMOVED LOWER RIGHT JAW   • PROSTATE BIOPSY     • SIGMOIDOSCOPY     • SKIN CANCER EXCISION      right cheek   • TENDON REPAIR      LEFT HAND   • TOTAL KNEE ARTHROPLASTY Right 6/1/2022    Procedure: TOTAL KNEE ARTHROPLASTY, right;  Surgeon: Black Jimenez MD;  Location: [facility] MAIN OR;  Service: Orthopedics;  Laterality: Right;   • UPPER GASTROINTESTINAL ENDOSCOPY        General Information     Row Name 06/03/22 0446          OT Time and Intention    Document Type discharge evaluation/summary  -PG     Mode of Treatment occupational therapy  -PG           User Key  (r) = Recorded By, (t) = Taken By, (c) = Cosigned By    Initials Name Provider Type    PG Migue Almaguer OT Occupational Therapist               Mobility/ADL's    No documentation.                Obj/Interventions    No documentation.                Goals/Plan     Row Name 06/03/22 0446          Transfer Goal 1 (OT)    Progress/Outcome (Transfer Goal 1, OT) discharged from facility  -PG     Row Name 06/03/22 0446          Bathing Goal 1 (OT)    Progress/Outcomes (Bathing Goal 1, OT) discharged from facility  -PG     Row Name 06/03/22 0446          Dressing Goal 1 (OT)    Progress/Outcome (Dressing Goal 1, OT) discharged from facility  -PG     Row Name 06/03/22 0446          Toileting Goal 1 (OT)    Progress/Outcome (Toileting Goal 1, OT) discharged from facility  -PG     Row Name  06/03/22 0446          Grooming Goal 1 (OT)    Progress/Outcome (Grooming Goal 1, OT) discharged from facility  -           User Key  (r) = Recorded By, (t) = Taken By, (c) = Cosigned By    Initials Name Provider Type    PG Migue Almaguer OT Occupational Therapist               Clinical Impression    No documentation.                Outcome Measures    No documentation.               Occupational Therapy Education                 Title: PT OT SLP Therapies (Resolved)     Topic: Occupational Therapy (Resolved)     Point: ADL training (Resolved)     Description:   Instruct learner(s) on proper safety adaptation and remediation techniques during self care or transfers.   Instruct in proper use of assistive devices.              Learning Progress Summary           Patient Acceptance, E,D, DU by PG at 6/2/2022 0927                   Point: Home exercise program (Resolved)     Description:   Instruct learner(s) on appropriate technique for monitoring, assisting and/or progressing therapeutic exercises/activities.              Learning Progress Summary           Patient Acceptance, E,D, DU by PG at 6/2/2022 0927                   Point: Precautions (Resolved)     Description:   Instruct learner(s) on prescribed precautions during self-care and functional transfers.              Learning Progress Summary           Patient Acceptance, E,D, DU by PG at 6/2/2022 0927                   Point: Body mechanics (Resolved)     Description:   Instruct learner(s) on proper positioning and spine alignment during self-care, functional mobility activities and/or exercises.              Learning Progress Summary           Patient Acceptance, E,D, DU by PG at 6/2/2022 0927                               User Key     Initials Effective Dates Name Provider Type Discipline     06/16/21 -  Migue Almaguer OT Occupational Therapist OT              OT Recommendation and Plan  Planned Therapy Interventions (OT): activity tolerance training,  BADL retraining, occupation/activity based interventions, patient/caregiver education/training, transfer/mobility retraining, strengthening exercise  Therapy Frequency (OT): 5 times/wk  Plan of Care Review  Plan of Care Reviewed With: patient  Progress: no change  Outcome Evaluation: Patient presents with limitations affecting strength, activity tolerance, and balance impacting patient's ability to return home safely and independently.  The skills of a therapist will be required to safely and effectively implement the following treatment plan to restore maximal level of function  Plan of Care Reviewed With: patient  Outcome Evaluation: Patient presents with limitations affecting strength, activity tolerance, and balance impacting patient's ability to return home safely and independently.  The skills of a therapist will be required to safely and effectively implement the following treatment plan to restore maximal level of function     Time Calculation:     Therapy Charges for Today     Code Description Service Date Service Provider Modifiers Qty    14166143340  OT THERAPEUTIC ACT EA 15 MIN 6/2/2022 Migue Almaguer OT GO 1    [id number]  OT SELF CARE/MGMT/TRAIN EA 15 MIN 6/2/2022 Migue Almaguer OT GO 1    [id number]  OT EVAL LOW COMPLEXITY 2 6/2/2022 Migue Almaguer OT GO 1             OT Discharge Summary  Anticipated Discharge Disposition (OT): home with home health  Reason for Discharge: Discharge from facility  Outcomes Achieved: Discharge from facility occurred on same date as evluation  Discharge Destination: Home with home health    Migue Almaguer OT  6/3/2022

## 2022-06-03 NOTE — ED PROVIDER NOTES
Hermann White is a 71-year-old male that presents to the emergency department today for complaints of urinary frequency that started yesterday.  He states that he had a Malcolm catheter inserted preoperatively 2 days ago whenever he had his right knee replaced.  No further complaints.          Review of Systems   Genitourinary: Positive for frequency.   All other systems reviewed and are negative.      Past Medical History:   Diagnosis Date   • Acid reflux    • Anxiety    • Arthritis     generalized,  right knee   • Cancer (HCC)     RIGHT CHEEK SKIN CANCER REMOVED   • Chronic post-traumatic stress disorder (PTSD)    • Claustrophobia    • Depression    • Diabetes mellitus (HCC)     TYPE 2, BS IN THE A.M.    • GERD (gastroesophageal reflux disease)    • Hyperlipemia    • Hypertension    • Left elbow pain 06/18/2018   • Left shoulder pain 05/17/2018   • Left wrist pain 06/22/2018   • Mood disorder (HCC)    • Sleep apnea     cpap    • Suicide ideation     PT STATES SUFFERS FROM PTSD AND BECOMES SEVERELY ANXIOUS AND DEPRESSED, FOLLOWS W/ COUNSELOR AND TAKES MEDS TO HELP       Allergies   Allergen Reactions   • Amoxicillin Hives and Rash     Other reaction(s): rash       Past Surgical History:   Procedure Laterality Date   • CHOLECYSTECTOMY N/A 01/14/2022    Procedure: CHOLECYSTECTOMY LAPAROSCOPIC;  Surgeon: Abdulkadir Sol MD;  [place] [facility] [place] [facility] [place]  Service: General;  Laterality: N/A;   • COLONOSCOPY     • COLONOSCOPY N/A 02/10/2022    Procedure: COLONOSCOPY with random biopsies, polypectomy with cold snare;  Surgeon: Lamont Hargrove MD;  Location: [facility] ENDOSCOPY;  Service: Gastroenterology;  Laterality: N/A;  diverticulosis, colon polyps    • CYSTOSCOPY     • ENDOSCOPY N/A 02/10/2022    Procedure: ESOPHAGOGASTRODUODENOSCOPY with biopsy;  Surgeon: Lamont Hargrove MD;  Location: [facility] ENDOSCOPY;  Service: Gastroenterology;  Laterality: N/A;  hiatal hernia   • HAND SURGERY      left thumb  repair   • HEMORRHOIDECTOMY      BANDED, ,, ,    • INGUINAL HERNIA REPAIR      RIGHT   • LACERATION REPAIR      HEAD, AGE 18   • MOUTH SURGERY Right     CYST REMOVED LOWER RIGHT JAW   • PROSTATE BIOPSY     • SIGMOIDOSCOPY     • SKIN CANCER EXCISION      right cheek   • TENDON REPAIR      LEFT HAND   • TOTAL KNEE ARTHROPLASTY Right 2022    Procedure: TOTAL KNEE ARTHROPLASTY, right;  Surgeon: Black Jimenez MD;  Location: [facility] MAIN OR;  Service: Orthopedics;  Laterality: Right;   • UPPER GASTROINTESTINAL ENDOSCOPY         Family History   Problem Relation Age of Onset   • Heart disease Mother    • Cancer Mother    • Kidney cancer Mother    • Stroke Father    • Kidney cancer Father    • Arthritis Father    • Malig Hyperthermia Neg Hx    • Colon cancer Neg Hx        Social History     Socioeconomic History   • Marital status:    Tobacco Use   • Smoking status: Former Smoker     Types: Cigarettes     Quit date: 1970     Years since quittin.9   • Smokeless tobacco: Never Used   Vaping Use   • Vaping Use: Never used   Substance and Sexual Activity   • Alcohol use: Never   • Drug use: Never   • Sexual activity: Defer           Objective   Physical Exam  Vitals and nursing note reviewed.   Constitutional:       General: He is not in acute distress.     Appearance: Normal appearance. He is not ill-appearing, toxic-appearing or diaphoretic.   Cardiovascular:      Rate and Rhythm: Normal rate and regular rhythm.      Pulses: Normal pulses.      Heart sounds: Normal heart sounds.   Pulmonary:      Effort: Pulmonary effort is normal. No respiratory distress.      Breath sounds: Normal breath sounds.   Abdominal:      General: Abdomen is flat.      Palpations: Abdomen is soft.      Tenderness: There is abdominal tenderness.      Comments: Suprapubic   Musculoskeletal:         General: Normal range of motion.      Cervical back: Normal range of motion and neck supple.   Skin:      General: Skin is warm and dry.   Neurological:      Mental Status: He is alert and oriented to person, place, and time. Mental status is at baseline.         Procedures           ED Course                                                 MDM  Number of Diagnoses or Management Options  Bacterial UTI  Diagnosis management comments: Seen and assessed patient as noted.  Vitals stable, no acute distress, afebrile.      Labs ordered.    Full work-up shows no emergent findings.  Treating patient with antibiotic because he complains of frequency.       Amount and/or Complexity of Data Reviewed  Clinical lab tests: ordered and reviewed    Risk of Complications, Morbidity, and/or Mortality  Presenting problems: moderate  Diagnostic procedures: moderate  Management options: moderate    Patient Progress  Patient progress: stable      Final diagnoses:   Bacterial UTI       ED Disposition  ED Disposition     ED Disposition   Discharge    Condition   Stable    Comment   --             [facility] EMERGENCY ROOM  [address] [address] [phone]  Go to   If symptoms worsen         Medication List      New Prescriptions    sulfamethoxazole-trimethoprim 800-160 MG per tablet  Commonly known as: BACTRIM DS,SEPTRA DS  Take 1 tablet by mouth 2 (Two) Times a Day.           Where to Get Your Medications      Information about where to get these medications is not yet available    Ask your nurse or doctor about these medications  · sulfamethoxazole-trimethoprim 800-160 MG per tablet          Maritza Toledo APRN  06/03/22 1010       Maritza Toledo APRN  06/03/22 1011

## 2022-06-06 ENCOUNTER — TELEPHONE (OUTPATIENT)
Dept: ORTHOPEDIC SURGERY | Facility: CLINIC | Age: 72
End: 2022-06-06

## 2022-06-06 NOTE — TELEPHONE ENCOUNTER
Provider:  DR. DIOR GARCIA  Caller: CHYNA YOU  Relationship to Patient: SELF  Pharmacy: [facility] [phone]  Phone Number:  [phone]  Reason for Call:  PATIENT HAD RIGHT KNEE SURGERY ON 6/1/22. PATIENT SAYS HE HAS CHANGED THE BANDAGE AND WANTS TO KNOW IF HE STILL NEEDS TO WEAR THE COMPRESSION SOCKS? ALSO, PATIENT IS HAVING A LOT OF CONSTIPATION FROM THE PAIN MEDS. USING FLEETS ENEMAS. HAS ABOUT 1 WEEKS WORTH OF PAIN RX LEFT. SHOULD HE SWITCH TO REGULAR TYLENOL? SAYS HIS PAIN IS MANAGEABLE. PLEASE ADVISE.

## 2022-06-07 ENCOUNTER — TELEPHONE (OUTPATIENT)
Dept: ORTHOPEDIC SURGERY | Facility: CLINIC | Age: 72
End: 2022-06-07

## 2022-06-07 DIAGNOSIS — M17.11 OSTEOARTHRITIS OF RIGHT KNEE, UNSPECIFIED OSTEOARTHRITIS TYPE: ICD-10-CM

## 2022-06-07 RX ORDER — HYDROCODONE BITARTRATE AND ACETAMINOPHEN 7.5; 325 MG/1; MG/1
1-2 TABLET ORAL EVERY 4 HOURS PRN
Qty: 40 TABLET | Refills: 0 | Status: SHIPPED | OUTPATIENT
Start: 2022-06-07

## 2022-06-07 NOTE — TELEPHONE ENCOUNTER
POST OP PHONE CALL.  PATIENT WILL BE OUT OF PAIN MEDS ON Thursday.  REQUESTING A REFILL. [facility] IN [place].

## 2022-06-16 ENCOUNTER — OFFICE VISIT (OUTPATIENT)
Dept: ORTHOPEDIC SURGERY | Facility: CLINIC | Age: 72
End: 2022-06-16

## 2022-06-16 VITALS — HEART RATE: 78 BPM | BODY MASS INDEX: 34.83 KG/M2 | WEIGHT: 204 LBS | OXYGEN SATURATION: 96 % | HEIGHT: 64 IN

## 2022-06-16 DIAGNOSIS — Z47.89 AFTERCARE FOLLOWING SURGERY OF THE MUSCULOSKELETAL SYSTEM: Primary | ICD-10-CM

## 2022-06-16 DIAGNOSIS — Z47.89 AFTERCARE FOLLOWING SURGERY OF THE MUSCULOSKELETAL SYSTEM: ICD-10-CM

## 2022-06-16 PROCEDURE — 99024 POSTOP FOLLOW-UP VISIT: CPT | Performed by: PHYSICIAN ASSISTANT

## 2022-06-16 NOTE — PROGRESS NOTES
"Chief Complaint  Pain and Follow-up of the Right Knee    Subjective          Christopher Finley is a 71 y.o. male  presents to [facility] for   History of Present Illness      Patient presents for 2-week postoperative evaluation of right total knee arthroplasty, 2022.  Patient presents with his wife Tomas.  Patient and wife state he has been doing well he states he has been attending physical therapy at his home they come twice a week he is going to start outpatient physical therapy this Monday at  in [place].  Patient had staples removed today, Steri-Strips were placed.  Patient has been taking DVT prophylaxis he is working on finishing the aspirin at this time.  He denies calf pain, states pain is controlled he would like to wean off the pain medicine as soon as possible.  Patient takes Tylenol as needed for pain as well.  Patient states incision has been healing well, denies redness swelling drainage.      Allergies   Allergen Reactions   • Amoxicillin Hives and Rash     Other reaction(s): rash        Social History     Socioeconomic History   • Marital status:    Tobacco Use   • Smoking status: Former Smoker     Types: Cigarettes     Quit date: 1970     Years since quittin.0   • Smokeless tobacco: Never Used   Vaping Use   • Vaping Use: Never used   Substance and Sexual Activity   • Alcohol use: Never   • Drug use: Never   • Sexual activity: Defer        REVIEW OF SYSTEMS    Constitutional: Denies fevers, chills, weight loss  Cardiovascular: Denies chest pain, shortness of breath  Skin: Denies rashes, acute skin changes  Neurologic: Denies headache, loss of consciousness  MSK: Right knee pain      Objective   Vital Signs:   Pulse 78   Ht 162.6 cm (64\")   Wt 92.5 kg (204 lb)   SpO2 96%   BMI 35.02 kg/m²     Body mass index is 35.02 kg/m².    Physical Exam    Right knee: Staples were removed today, incision is healing well, no erythema, no ecchymosis, no " drainage, mild generalized swelling to the knee, extension -3, flexion 100, stable to varus/valgus stress, stable anterior/posterior drawer, nontender calf, negative Qing testing.    Procedures    Imaging Results (Most Recent)     Procedure Component Value Units Date/Time    XR Knee 3 View Right [[id number]] Resulted: 06/16/22 1222     Updated: 06/16/22 1222    Narrative:      • View:AP, Lateral and Sunrise view(s)  • Site: Right knee  • Indication: Right knee pain  • Study: X-rays ordered, taken in the office, and reviewed today  • X-ray: Intact appearing right total knee arthroplasty, no signs of   hardware failure or loosening, no signs of periprosthetic fracture, good   alignment  • Comparative data: No comparative data found             Result Review :   The following data was reviewed by: DAI Martinez on 06/16/2022:  Data reviewed: Radiologic studies Reviewed by me with the patient and his spouse.             Assessment and Plan    Diagnoses and all orders for this visit:    1. Aftercare following surgery of right total knee arthroplasty 6/1/2022 (Primary)  -     XR Knee 3 View Right    2. Aftercare following surgery of the musculoskeletal system  -     XR Knee 3 View Right        Reviewed x-rays with the patient and his wife, discussed incision care discussed need to continue physical therapy, call for pain medication refill, if worsening symptoms occur follow-up sooner otherwise follow-up in 4 weeks for reevaluation, Steri-Strips were placed today.    Call or return if worsening symptoms.    Follow Up   Return in about 4 weeks (around 7/14/2022) for Recheck.  Patient was given instructions and counseling regarding his condition or for health maintenance advice. Please see specific information pulled into the AVS if appropriate.

## 2022-06-17 DIAGNOSIS — Z47.89 AFTERCARE FOLLOWING SURGERY OF THE MUSCULOSKELETAL SYSTEM: Primary | ICD-10-CM

## 2022-07-18 ENCOUNTER — OFFICE VISIT (OUTPATIENT)
Dept: ORTHOPEDIC SURGERY | Facility: CLINIC | Age: 72
End: 2022-07-18

## 2022-07-18 VITALS — BODY MASS INDEX: 33.65 KG/M2 | WEIGHT: 214.4 LBS | HEIGHT: 67 IN | OXYGEN SATURATION: 97 % | HEART RATE: 87 BPM

## 2022-07-18 DIAGNOSIS — Z47.89 AFTERCARE FOLLOWING SURGERY OF THE MUSCULOSKELETAL SYSTEM: Primary | ICD-10-CM

## 2022-07-18 PROCEDURE — 99024 POSTOP FOLLOW-UP VISIT: CPT | Performed by: PHYSICIAN ASSISTANT

## 2022-07-18 RX ORDER — LISINOPRIL 10 MG/1
TABLET ORAL
COMMUNITY
Start: 2022-07-14

## 2022-07-18 RX ORDER — ASPIRIN 81 MG/1
TABLET, COATED ORAL
COMMUNITY
Start: 2022-07-14

## 2022-07-18 NOTE — PROGRESS NOTES
"Chief Complaint  Pain and Follow-up of the Right Knee    Subjective          Christopher Finley is a 71 y.o. male  presents to [facility] for   History of Present Illness      Patient presents with his wife for follow-up evaluation of right total knee arthroplasty, 2022.  Patient states he has stopped the pain medication and mainly just takes Tylenol at night.  He states he stopped the cane about 3 weeks ago, he finished DVT prophylaxis.  He has been working hard at physical therapy for strength and range of motion.  He states if he has pain its in the posterior knee he states that he sprained his ankle recently but feels that this was caused by doing physical therapy exercises with one of the straps they gave him.  He states the ankle has gotten better.  He denies swelling locking catching of the knee, denies fever chills, he would like to continue physical therapy.      Allergies   Allergen Reactions   • Amoxicillin Hives and Rash     Other reaction(s): rash        Social History     Socioeconomic History   • Marital status:    Tobacco Use   • Smoking status: Former Smoker     Types: Cigarettes     Quit date: 1970     Years since quittin.0   • Smokeless tobacco: Never Used   Vaping Use   • Vaping Use: Never used   Substance and Sexual Activity   • Alcohol use: Never   • Drug use: Never   • Sexual activity: Defer        REVIEW OF SYSTEMS    Constitutional: Denies fevers, chills, weight loss  Cardiovascular: Denies chest pain, shortness of breath  Skin: Denies rashes, acute skin changes  Neurologic: Denies headache, loss of consciousness  MSK: Right knee pain      Objective   Vital Signs:   Pulse 87   Ht 170.2 cm (67\")   Wt 97.3 kg (214 lb 6.4 oz)   SpO2 97%   BMI 33.58 kg/m²     Body mass index is 33.58 kg/m².    Physical Exam    Right knee: Incision is well-healed, no erythema, no ecchymosis, mild generalized swelling, no fluctuance, no effusion.  Extension -3, " flexion 115, stable to varus/valgus stress, stable anterior/posterior drawer, 5 out of 5 strength, nontender calf, negative Qing testing.    Procedures    Imaging Results (Most Recent)     None           Result Review :   The following data was reviewed by: DAI Martinez on 07/18/2022:               Assessment and Plan    Diagnoses and all orders for this visit:    1. Aftercare following surgery of right total knee arthroplasty 6/1/2022 (Primary)  -     Ambulatory Referral to Physical Therapy Evaluate and treat (2-3x/week for 6-8 weeks)        Discussed diagnosis and treatment options with the patient he was advised to continue physical therapy new orders written, continue activity and weightbearing as tolerated, follow-up in 6 weeks with x-rays.    Call or return if worsening symptoms.    Follow Up   Return in about 6 weeks (around 8/29/2022) for Recheck.  Patient was given instructions and counseling regarding his condition or for health maintenance advice. Please see specific information pulled into the AVS if appropriate.

## 2022-07-26 DIAGNOSIS — Z47.89 AFTERCARE FOLLOWING SURGERY OF THE MUSCULOSKELETAL SYSTEM: Primary | ICD-10-CM

## 2022-07-26 RX ORDER — CLINDAMYCIN HYDROCHLORIDE 300 MG/1
CAPSULE ORAL
Qty: 2 CAPSULE | Refills: 0 | Status: SHIPPED | OUTPATIENT
Start: 2022-07-26 | End: 2022-08-25 | Stop reason: SDUPTHER

## 2022-07-26 RX ORDER — CLINDAMYCIN HYDROCHLORIDE 300 MG/1
CAPSULE ORAL
Qty: 2 CAPSULE | Refills: 0 | Status: SHIPPED | OUTPATIENT
Start: 2022-07-26 | End: 2022-07-26

## 2022-07-26 NOTE — TELEPHONE ENCOUNTER
PATIENT HAS DENTAL APPOINTMENT COMING UP, REQUESTING PRE MED ABX. [facility].

## 2022-08-25 DIAGNOSIS — Z47.89 AFTERCARE FOLLOWING SURGERY OF THE MUSCULOSKELETAL SYSTEM: ICD-10-CM

## 2022-08-25 RX ORDER — CLINDAMYCIN HYDROCHLORIDE 300 MG/1
CAPSULE ORAL
Qty: 2 CAPSULE | Refills: 1 | Status: SHIPPED | OUTPATIENT
Start: 2022-08-25 | End: 2023-02-21 | Stop reason: SDUPTHER

## 2022-09-06 ENCOUNTER — OFFICE VISIT (OUTPATIENT)
Dept: ORTHOPEDIC SURGERY | Facility: CLINIC | Age: 72
End: 2022-09-06

## 2022-09-06 VITALS — OXYGEN SATURATION: 97 % | WEIGHT: 207 LBS | HEIGHT: 67 IN | HEART RATE: 50 BPM | BODY MASS INDEX: 32.49 KG/M2

## 2022-09-06 DIAGNOSIS — Z47.89 AFTERCARE FOLLOWING SURGERY OF THE MUSCULOSKELETAL SYSTEM: Primary | ICD-10-CM

## 2022-09-06 DIAGNOSIS — Z47.89 AFTERCARE FOLLOWING SURGERY OF THE MUSCULOSKELETAL SYSTEM: ICD-10-CM

## 2022-09-06 PROCEDURE — 99212 OFFICE O/P EST SF 10 MIN: CPT | Performed by: PHYSICIAN ASSISTANT

## 2022-09-06 RX ORDER — CELECOXIB 100 MG/1
CAPSULE ORAL
COMMUNITY
Start: 2022-08-24

## 2022-09-06 NOTE — PROGRESS NOTES
"Chief Complaint  Follow-up and Pain of the Right Knee    Subjective          Christopher Finley is a 72 y.o. male  presents to [facility] for   History of Present Illness      Patient presents for follow-up evaluation of right total knee arthroplasty, 2022.  He presents with his wife.  Patient states that his last visit of therapy was last Friday.  He would like to continue home exercises.  He is happy with his progress he states he has no pain he states he can walk around without trouble compared to how he felt prior to his surgery.  He states he has good strength, denies new injury or symptoms of pain.  He denies swelling, denies calf pain.  No new complaints      Allergies   Allergen Reactions   • Amoxicillin Hives and Rash     Other reaction(s): rash        Social History     Socioeconomic History   • Marital status:    Tobacco Use   • Smoking status: Former Smoker     Types: Cigarettes     Quit date: 1970     Years since quittin.2   • Smokeless tobacco: Never Used   Vaping Use   • Vaping Use: Never used   Substance and Sexual Activity   • Alcohol use: Never   • Drug use: Never   • Sexual activity: Defer        REVIEW OF SYSTEMS    Constitutional: Denies fevers, chills, weight loss  Cardiovascular: Denies chest pain, shortness of breath  Skin: Denies rashes, acute skin changes  Neurologic: Denies headache, loss of consciousness  MSK: Right knee pain      Objective   Vital Signs:   Pulse 50   Ht 170.2 cm (67\")   Wt 93.9 kg (207 lb)   SpO2 97%   BMI 32.42 kg/m²     Body mass index is 32.42 kg/m².    Physical Exam    Right knee: Incision is well-healed, no erythema, no ecchymosis, no swelling, no effusion, no signs of infection, nontender to palpation, no pain with range of motion, full extension, flexion 115, stable to varus/valgus stress, stable anterior/posterior drawer, nontender calf, negative Qing testing.    Procedures    Imaging Results (Most Recent)     " Procedure Component Value Units Date/Time    XR Knee 3 View Right [[id number]] Resulted: 09/06/22 1020     Updated: 09/06/22 1021    Narrative:      • View:AP, Lateral and [place] view(s)  • Site: Right knee  • Indication: Right knee pain  • Study: X-rays ordered, taken in the office, and reviewed today  • X-ray: Intact appearing right total knee arthroplasty, no signs of   hardware failure or loosening, no signs of subsidence or periprosthetic   fracture, good alignment.  • Comparative data: Compared to previous studies             Result Review :   The following data was reviewed by: DAI Martinez on 09/06/2022:  Data reviewed: Radiologic studies Reviewed by me with the patient             Assessment and Plan    Diagnoses and all orders for this visit:    1. Aftercare following surgery of right total knee arthroplasty 6/1/2022 (Primary)  -     XR Knee 3 View Right    2. Aftercare following surgery of the musculoskeletal system  -     XR Knee 3 View Right        Reviewed x-rays with the patient and his wife, advised him he may finish physical therapy, continue home exercises, continue weightbearing and activity as tolerated if any new or concerning symptoms occur follow-up sooner otherwise follow-up in 3 months with x-rays, patient and spouse agreed.    Call or return if worsening symptoms.    Follow Up   Return in about 3 months (around 12/6/2022) for Recheck.  Patient was given instructions and counseling regarding his condition or for health maintenance advice. Please see specific information pulled into the AVS if appropriate.

## 2022-12-06 ENCOUNTER — OFFICE VISIT (OUTPATIENT)
Dept: ORTHOPEDIC SURGERY | Facility: CLINIC | Age: 72
End: 2022-12-06

## 2022-12-06 VITALS — WEIGHT: 215 LBS | BODY MASS INDEX: 33.74 KG/M2 | HEIGHT: 67 IN | OXYGEN SATURATION: 96 % | HEART RATE: 68 BPM

## 2022-12-06 DIAGNOSIS — Z47.89 AFTERCARE FOLLOWING SURGERY OF THE MUSCULOSKELETAL SYSTEM: ICD-10-CM

## 2022-12-06 DIAGNOSIS — Z47.89 AFTERCARE FOLLOWING SURGERY OF THE MUSCULOSKELETAL SYSTEM: Primary | ICD-10-CM

## 2022-12-06 PROCEDURE — 99213 OFFICE O/P EST LOW 20 MIN: CPT | Performed by: PHYSICIAN ASSISTANT

## 2022-12-06 RX ORDER — LANOLIN ALCOHOL/MO/W.PET/CERES
CREAM (GRAM) TOPICAL
COMMUNITY
Start: 2022-11-23

## 2022-12-06 NOTE — PROGRESS NOTES
"Chief Complaint  Follow-up and Pain of the Right Knee    Subjective          Christopher Finley is a 72 y.o. male  presents to [facility] for   History of Present Illness      Patient presents for follow-up evaluation of right total knee arthroplasty, 2022.  He presents with his wife.  Patient states that he has been doing well he states that he is happy with his knee replacement he denies injury or symptoms of pain he states he needs no pain medication or NSAIDs.  Denies swelling, denies locking catching or buckling states he has good strength and range of motion, no new complaints.  Patient wife agrees      Allergies   Allergen Reactions   • Amoxicillin Hives and Rash     Other reaction(s): rash        Social History     Socioeconomic History   • Marital status:    Tobacco Use   • Smoking status: Former     Types: Cigarettes     Quit date: 1970     Years since quittin.4   • Smokeless tobacco: Never   Vaping Use   • Vaping Use: Never used   Substance and Sexual Activity   • Alcohol use: Never   • Drug use: Never   • Sexual activity: Defer        REVIEW OF SYSTEMS    Constitutional: Denies fevers, chills, weight loss  Cardiovascular: Denies chest pain, shortness of breath  Skin: Denies rashes, acute skin changes  Neurologic: Denies headache, loss of consciousness  MSK: Right knee pain      Objective   Vital Signs:   Pulse 68   Ht 170.2 cm (67\")   Wt 97.5 kg (215 lb)   SpO2 96%   BMI 33.67 kg/m²     Body mass index is 33.67 kg/m².    Physical Exam    Right knee: Incision is well-healed, no erythema, no ecchymosis, no swelling, no signs of infection, no effusion, full extension, flexion 120, stable to varus/valgus stress, stable anterior/posterior drawer, no pain with range of motion, no pain with resisted range of motion, nontender to palpation, nontender calf, negative Qing testing    Procedures    Imaging Results (Most Recent)     Procedure Component Value Units " Date/Time    XR Knee 3 View Right [[id number]] Resulted: 12/06/22 1026     Updated: 12/06/22 1027    Narrative:      • View:AP/Lateral and [place] view(s)  • Site: Right knee  • Indication: Right knee pain  • Study: X-rays ordered, taken in the office, and reviewed today  • X-ray: Intact appearing right total knee arthroplasty, no signs of   hardware failure or loosening, no subsidence or periprosthetic fracture,   good alignment  • Comparative data: No comparative data found             Result Review :   The following data was reviewed by: DAI Martinez on 12/06/2022:  Data reviewed: Radiologic studies Reviewed by me with the patient and his wife             Assessment and Plan    Diagnoses and all orders for this visit:    1. Aftercare following surgery of right total knee arthroplasty 6/1/2022 (Primary)  -     XR Knee 3 View Right    2. Aftercare following surgery of the musculoskeletal system  -     XR Knee 3 View Right        Reviewed x-rays with the patient and his wife, discussed diagnosis and treatment options he will continue weightbearing and activity as tolerated follow-up in 6 months for recheck with x-rays, patient and wife agreed    Call or return if worsening symptoms.    Follow Up   Return in about 6 months (around 6/6/2023) for Recheck.  Patient was given instructions and counseling regarding his condition or for health maintenance advice. Please see specific information pulled into the AVS if appropriate.

## 2023-01-06 NOTE — PROGRESS NOTES
"Chief Complaint  Pain and Follow-up of the Right Knee     Subjective      Christopher Finley presents to [facility] for follow up evaluation of the right knee. The patient has been treating his right knee osteoarthritis conservatively.  He had to have surgery on his gallbladder. He was scheduled for a right Total Knee Arthroplasty but had to cancel due to the general surgery.     Allergies   Allergen Reactions   • Amoxicillin Hives and Rash     Other reaction(s): rash        Social History     Socioeconomic History   • Marital status:    Tobacco Use   • Smoking status: Former Smoker     Types: Cigarettes     Quit date: 1970     Years since quittin.8   • Smokeless tobacco: Never Used   Vaping Use   • Vaping Use: Never used   Substance and Sexual Activity   • Alcohol use: Never   • Drug use: Never   • Sexual activity: Defer        Review of Systems     Objective   Vital Signs:   Pulse 51   Ht 170.2 cm (67\")   Wt 97.1 kg (214 lb)   SpO2 98%   BMI 33.52 kg/m²       Physical Exam  Constitutional:       Appearance: Normal appearance. The patient is well-developed and normal weight.   HENT:      Head: Normocephalic.      Right Ear: Hearing and external ear normal.      Left Ear: Hearing and external ear normal.      Nose: Nose normal.   Eyes:      Conjunctiva/sclera: Conjunctivae normal.   Cardiovascular:      Rate and Rhythm: Normal rate.   Pulmonary:      Effort: Pulmonary effort is normal.      Breath sounds: No wheezing or rales.   Abdominal:      Palpations: Abdomen is soft.      Tenderness: There is no abdominal tenderness.   Musculoskeletal:      Cervical back: Normal range of motion.   Skin:     Findings: No rash.   Neurological:      Mental Status: The patient is alert and oriented to person, place, and time.   Psychiatric:         Mood and Affect: Mood and affect normal.         Judgment: Judgment normal.       Ortho Exam      Right knee- ROM -10 to 125 degrees. " Despite some risk factors, the patient does not demonstrate definitive evidence of glaucoma at this time. Neurovascularly intact. Tender to the anterior medial knee. No effusion. Neurovascularly intact. Stable to varus/valgus stress. Stable to anterior/posterior drawer. Positive EHL, FHL, GS and TA. Sensation intact to all 5 nerves of the foot. Positivepulses.     Procedures      Imaging Results (Most Recent)     None           Result Review :       No results found.           Assessment and Plan     Diagnoses and all orders for this visit:    1. Primary osteoarthritis of right knee (Primary)        Discussed the treatment options with the patient, operative vs non-operative. Discussed the risks and benefits of a Right Total Knee Arthroplasty. The patient expressed understanding and wished to proceed.     Discussed surgery., Risks/benefits discussed with patient including, but not limited to: infection, bleeding, neurovascular damage, malunion, nonunion, aesthetic deformity, need for further surgery, and death., Discussed with patient the implant type being used during surgery and patient understands and desires to proceed., Surgery pamphlet given. and Call or return if worsening symptoms.    Follow Up     2 weeks postoperatively.        Patient was given instructions and counseling regarding his condition or for health maintenance advice. Please see specific information pulled into the AVS if appropriate.     Scribed for Black Jimenez MD by Rose Hidalgo.  05/05/22   14:30 EDT    I have personally performed the services described in this document as scribed by the above individual and it is both accurate and complete. Black Jimenez MD 05/08/22

## 2023-02-21 DIAGNOSIS — Z47.89 AFTERCARE FOLLOWING SURGERY OF THE MUSCULOSKELETAL SYSTEM: ICD-10-CM

## 2023-02-21 RX ORDER — CLINDAMYCIN HYDROCHLORIDE 300 MG/1
CAPSULE ORAL
Qty: 2 CAPSULE | Refills: 0 | Status: SHIPPED | OUTPATIENT
Start: 2023-02-21 | End: 2023-03-22 | Stop reason: SDUPTHER

## 2023-03-22 DIAGNOSIS — Z47.89 AFTERCARE FOLLOWING SURGERY OF THE MUSCULOSKELETAL SYSTEM: ICD-10-CM

## 2023-03-22 RX ORDER — CLINDAMYCIN HYDROCHLORIDE 300 MG/1
CAPSULE ORAL
Qty: 2 CAPSULE | Refills: 2 | Status: SHIPPED | OUTPATIENT
Start: 2023-03-22 | End: 2023-03-22 | Stop reason: SDUPTHER

## 2023-03-22 RX ORDER — CLINDAMYCIN HYDROCHLORIDE 300 MG/1
CAPSULE ORAL
Qty: 2 CAPSULE | Refills: 2 | Status: SHIPPED | OUTPATIENT
Start: 2023-03-22

## 2023-04-09 ENCOUNTER — HOSPITAL ENCOUNTER (EMERGENCY)
Facility: HOSPITAL | Age: 73
Discharge: HOME | End: 2023-04-09
Attending: EMERGENCY MEDICINE | Admitting: EMERGENCY MEDICINE
Payer: MEDICARE

## 2023-04-09 ENCOUNTER — APPOINTMENT (OUTPATIENT)
Dept: GENERAL RADIOLOGY | Facility: HOSPITAL | Age: 73
End: 2023-04-09
Payer: MEDICARE

## 2023-04-09 VITALS
BODY MASS INDEX: 36.19 KG/M2 | RESPIRATION RATE: 20 BRPM | SYSTOLIC BLOOD PRESSURE: 131 MMHG | HEART RATE: 55 BPM | WEIGHT: 230.6 LBS | HEIGHT: 67 IN | OXYGEN SATURATION: 91 % | TEMPERATURE: 97.9 F | DIASTOLIC BLOOD PRESSURE: 63 MMHG
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
## 2023-04-09 DIAGNOSIS — S40.011A CONTUSION OF RIGHT SHOULDER, INITIAL ENCOUNTER: ICD-10-CM

## 2023-04-09 DIAGNOSIS — S80.01XA CONTUSION OF RIGHT KNEE, INITIAL ENCOUNTER: ICD-10-CM

## 2023-04-09 PROCEDURE — 73030 X-RAY EXAM OF SHOULDER: CPT

## 2023-04-09 PROCEDURE — 73562 X-RAY EXAM OF KNEE 3: CPT

## 2023-04-09 PROCEDURE — 99282 EMERGENCY DEPT VISIT SF MDM: CPT

## 2023-04-09 NOTE — ED PROVIDER NOTES
Time: 1:23 PM EDT  Date of encounter:  4/9/2023  Independent Historian/Clinical History and Information was obtained by:   Patient  Chief Complaint: Fall    History is limited by: N/A    History of Present Illness:  Patient is a 72 y.o. year old male who presents to the emergency department for evaluation of fall.   Pt states that he missed the last step of the stair and fell on his right shoulder and knee. Pt states he didn't hit his head or pass out.     HPI    Patient Care Team  Primary Care Provider: Samy Wing MD    Past Medical History:     Allergies   Allergen Reactions   • Amoxicillin Hives and Rash     Other reaction(s): rash     Past Medical History:   Diagnosis Date   • Acid reflux    • Anxiety    • Arthritis     generalized,  right knee   • Cancer     RIGHT CHEEK SKIN CANCER REMOVED   • Chronic post-traumatic stress disorder (PTSD)    • Claustrophobia    • Depression    • Diabetes mellitus     TYPE 2, BS IN THE A.M.    • GERD (gastroesophageal reflux disease)    • Hyperlipemia    • Hypertension    • Left elbow pain 06/18/2018   • Left shoulder pain 05/17/2018   • Left wrist pain 06/22/2018   • Mood disorder    • Sleep apnea     cpap    • Suicide ideation     PT STATES SUFFERS FROM PTSD AND BECOMES SEVERELY ANXIOUS AND DEPRESSED, FOLLOWS W/ COUNSELOR AND TAKES MEDS TO HELP     Past Surgical History:   Procedure Laterality Date   • CHOLECYSTECTOMY N/A 01/14/2022    Procedure: CHOLECYSTECTOMY LAPAROSCOPIC;  Surgeon: Abdulkadir Sol MD;  Location: [facility] [place];  Service: General;  Laterality: N/A;   • COLONOSCOPY     • COLONOSCOPY N/A 02/10/2022    Procedure: COLONOSCOPY with random biopsies, polypectomy with cold snare;  Surgeon: Lamont Hargrove MD;  Location: [facility];  Service: Gastroenterology;  Laterality: N/A;  diverticulosis, colon polyps    • CYSTOSCOPY     • ENDOSCOPY N/A 02/10/2022    Procedure: ESOPHAGOGASTRODUODENOSCOPY with biopsy;  Surgeon: Lamont Hargrove  MD;  Location: [facility] END[facility];  Service: Gastroenterology;  Laterality: N/A;  hiatal hernia   • HAND SURGERY      left thumb repair   • HEMORRHOIDECTOMY      BANDED, 2004,2006, 2007, 2020   • INGUINAL HERNIA REPAIR  1998    RIGHT   • LACERATION REPAIR      HEAD, AGE 18   • MOUTH SURGERY Right     CYST REMOVED LOWER RIGHT JAW   • PROSTATE BIOPSY     • SIGMOIDOSCOPY     • SKIN CANCER EXCISION      right cheek   • TENDON REPAIR      LEFT HAND   • TOTAL KNEE ARTHROPLASTY Right 6/1/2022    Procedure: TOTAL KNEE ARTHROPLASTY, right;  Surgeon: Black Jimenez MD;  Location: [facility] MAIN OR;  Service: Orthopedics;  Laterality: Right;   • UPPER GASTROINTESTINAL ENDOSCOPY       Family History   Problem Relation Age of Onset   • Heart disease Mother    • Cancer Mother    • Kidney cancer Mother    • Stroke Father    • Kidney cancer Father    • Arthritis Father    • Malig Hyperthermia Neg Hx    • Colon cancer Neg Hx        Home Medications:  Prior to Admission medications    Medication Sig Start Date End Date Taking? Authorizing Provider   Aspirin Adult Low Dose 81 MG EC tablet  7/14/22   Franchesca Le MD   buPROPion SR (WELLBUTRIN SR) 150 MG 12 hr tablet Take 75 mg by mouth Daily.    Franchesca Le MD   carBAMazepine (TEGretol) 200 MG tablet Take 200 mg by mouth Every Night.    Franchesca Le MD   carboxymethylcellulose (REFRESH PLUS) 0.5 % solution Administer 1 drop to both eyes As Needed for Dry Eyes.    Franchesca Le MD   celecoxib (CeleBREX) 100 MG capsule  8/24/22   Franchesca Le MD   clindamycin (Cleocin) 300 MG capsule TAKE 2 CAPSULES 1 HOUR PRIOR TO DENTAL APPOINTMENT 3/22/23   Black Jimenez MD   cyproheptadine (PERIACTIN) 4 MG tablet Take 4 mg by mouth Every Night.    Franchesca Le MD   dicyclomine (BENTYL) 10 MG capsule Take 10 mg by mouth Every 6 (Six) Hours As Needed.    Franchesca Le MD   dilTIAZem CD (CARDIZEM CD) 180 MG 24 hr capsule Take 180 mg  by mouth Every Night.    Franchesca Le MD   escitalopram (LEXAPRO) 20 MG tablet Take 20 mg by mouth Every Night. Currently out of but planning on starting back up    Emergency, Nurse LEAH Coello   HYDROcodone-acetaminophen (Norco) 7.5-325 MG per tablet Take 1-2 tablets by mouth Every 4 (Four) Hours As Needed for Moderate Pain . 22   Blakc Jimenez MD   Hydrocortisone, Perianal, (ANUSOL-HC) 2.5 % rectal cream Insert 1 application into the rectum As Needed. 10/19/21   Franchesca Le MD   lisinopril (PRINIVIL,ZESTRIL) 10 MG tablet  22   Franchesca Le MD   metFORMIN (GLUCOPHAGE) 500 MG tablet Take 250 mg by mouth 2 (Two) Times a Day With Meals.  tab    Franchesca Le MD   pantoprazole (PROTONIX) 40 MG EC tablet Take 40 mg by mouth Daily. 21   Emergency, Nurse LEAH Coello   rosuvastatin (CRESTOR) 40 MG tablet Take 40 mg by mouth Every Night.    Franchesca Le MD   sulfamethoxazole-trimethoprim (Bactrim DS) 800-160 MG per tablet Take 1 tablet by mouth 2 (Two) Times a Day. 6/3/22   Maritza Toledo APRN   vitamin B-12 (CYANOCOBALAMIN) 1000 MCG tablet  22   Franchesca Le MD   Vitamin D 125 MCG (5000 UT) capsule capsule Take 5,000 Units by mouth Daily. 21   Emergency, Nurse LEAH Coello        Social History:   Social History     Tobacco Use   • Smoking status: Former     Types: Cigarettes     Quit date: 1970     Years since quittin.8   • Smokeless tobacco: Never   Vaping Use   • Vaping Use: Never used   Substance Use Topics   • Alcohol use: Never   • Drug use: Never         Review of Systems:  Review of Systems   Constitutional: Negative for chills and fever.   HENT: Negative for sore throat.    Eyes: Negative for photophobia.   Respiratory: Negative for shortness of breath.    Cardiovascular: Negative for chest pain.   Gastrointestinal: Negative for abdominal pain, diarrhea, nausea and vomiting.   Genitourinary: Negative for dysuria.  "  Musculoskeletal: Negative for neck pain.   Skin: Negative for wound.   Neurological: Negative for headaches.   All other systems reviewed and are negative.       Physical Exam:  /63   Pulse 55   Temp 97.9 °F (36.6 °C) (Oral)   Resp 20   Ht 170.2 cm (67\")   Wt 105 kg (230 lb 9.6 oz)   SpO2 91%   BMI 36.12 kg/m²     Physical Exam  Vitals and nursing note reviewed.   Constitutional:       General: He is not in acute distress.  HENT:      Head: Normocephalic and atraumatic.   Eyes:      Extraocular Movements: Extraocular movements intact.   Cardiovascular:      Rate and Rhythm: Normal rate and regular rhythm.   Pulmonary:      Effort: Pulmonary effort is normal. No respiratory distress.      Breath sounds: Normal breath sounds.   Abdominal:      General: Abdomen is flat.      Palpations: Abdomen is soft.      Tenderness: There is no abdominal tenderness.   Musculoskeletal:         General: Tenderness present. No swelling or deformity.      Cervical back: Normal range of motion and neck supple.      Comments: Patient has tenderness over the right shoulder and mild pain with range of motion   Skin:     General: Skin is warm and dry.      Capillary Refill: Capillary refill takes less than 2 seconds.   Neurological:      Mental Status: He is alert and oriented to person, place, and time. Mental status is at baseline.   Psychiatric:         Mood and Affect: Mood normal.                  Procedures:  Procedures      Medical Decision Making:      Comorbidities that affect care:    Hyperlipidemia, Cancer, Diabetes, Hypertension    External Notes reviewed:    None      The following orders were placed and all results were independently analyzed by me:  Orders Placed This Encounter   Procedures   • XR Shoulder 2+ View Right   • XR Knee 3 View Right       Medications Given in the Emergency Department:  Medications - No data to display     ED Course:         Labs:    Lab Results (last 24 hours)     ** No results " found for the last 24 hours. **           Imaging:    XR Shoulder 2+ View Right    Result Date: 4/9/2023  PROCEDURE: XR SHOULDER 2+ VW RIGHT  COMPARISON: None  INDICATIONS: Right shoulder pain with movement, recent fall  FINDINGS:   Advanced degenerative changes are present in the right AC and glenohumeral joints.  No fractures, dislocations or acute osseous abnormalities are identified.  IMPRESSION: Advanced degenerative change.  No acute osseous abnormalities are identified in the right shoulder.   RAGINI LEDESMA MD       Electronically Signed and Approved By: RAGINI LEDESMA MD on 4/09/2023 at 13:53             XR Knee 3 View Right    Result Date: 4/9/2023  PROCEDURE: XR KNEE 3 VW RIGHT  COMPARISON: [facility] ANA LAURA, XR KNEE 3 VW RIGHT, 9/06/2022, 10:12.  [facility] ANA LAURA, XR KNEE 3 VW RIGHT, 12/06/2022, 11:18.  INDICATIONS: Right knee pain, recent fall  FINDINGS:   Prior total right knee arthroplasty.  The hardware is intact.  No fractures, dislocations or acute osseous abnormalities are identified.  IMPRESSION: Total right knee arthroplasty.  No acute osseous abnormalities are identified.   RAGINI LEDESMA MD       Electronically Signed and Approved By: RAGINI LEDESMA MD on 4/09/2023 at 13:55                 Differential Diagnosis and Discussion:    Trauma:  Differential diagnosis considered but not limited to were subarachnoid hemorrhage, intracranial bleeding, pneumothorax, cardiac contusion, lung contusion, intra-abdominal bleeding, and compartment syndrome of any extremity or other significant traumatic pathology    All labs were reviewed and interpreted by me.  All X-rays were independently reviewed by me.    MDM   Patient's diagnostic imaging are negative for any acute injury.  Patient declined pain medication patient stable for discharge.      Patient Care Considerations:    NARCOTICS: I considered prescribing opiate pain medication as an outpatient, however Patient  declined      Consultants/Shared Management Plan:    None    Social Determinants of Health:    Patient is independent, reliable, and has access to care.       Disposition and Care Coordination:    Discharged: The patient is suitable and stable for discharge with no need for consideration of observation or admission.        Final diagnoses:   Fall (on) (from) other stairs and steps, initial encounter   Contusion of right shoulder, initial encounter   Contusion of right knee, initial encounter        ED Disposition     ED Disposition   Discharge    Condition   Stable    Comment   --             This medical record created using voice recognition software.    Documentation assistance provided by Bharat Dalton, acting as scribe for Napoleon Bush DO. Information recorded by the scribe was done at my direction and has been verified and validated by me.     Bharat Dalton  04/09/23 [id number]       Napoleon Bush DO  04/09/23 [id number]

## 2023-05-05 ENCOUNTER — OFFICE VISIT (OUTPATIENT)
Dept: ORTHOPEDIC SURGERY | Facility: CLINIC | Age: 73
End: 2023-05-05
Payer: MEDICARE

## 2023-05-05 VITALS — HEIGHT: 66 IN | OXYGEN SATURATION: 98 % | WEIGHT: 230 LBS | BODY MASS INDEX: 36.96 KG/M2 | HEART RATE: 77 BPM

## 2023-05-05 DIAGNOSIS — M25.562 LEFT KNEE PAIN, UNSPECIFIED CHRONICITY: Primary | ICD-10-CM

## 2023-05-05 RX ADMIN — LIDOCAINE HYDROCHLORIDE 5 ML: 10 INJECTION, SOLUTION EPIDURAL; INFILTRATION; INTRACAUDAL; PERINEURAL at 10:59

## 2023-05-05 NOTE — PROGRESS NOTES
"Chief Complaint  Initial Evaluation of the Left Knee     Subjective      Christopher Finley presents to [facility] for evaluation of the left knee. He has previously had a right knee replacement that he has done well with. He ambulates with a crutch. He denies injury or trauma to the left knee.     Allergies   Allergen Reactions   • Amoxicillin Hives and Rash     Other reaction(s): rash        Social History     Socioeconomic History   • Marital status:    Tobacco Use   • Smoking status: Former     Types: Cigarettes     Quit date: 1970     Years since quittin.8   • Smokeless tobacco: Never   Vaping Use   • Vaping Use: Never used   Substance and Sexual Activity   • Alcohol use: Never   • Drug use: Never   • Sexual activity: Defer        Review of Systems     Objective   Vital Signs:   Pulse 77   Ht 167.6 cm (66\")   Wt 104 kg (230 lb)   SpO2 98%   BMI 37.12 kg/m²       Physical Exam  Constitutional:       Appearance: Normal appearance. The patient is well-developed and normal weight.   HENT:      Head: Normocephalic.      Right Ear: Hearing and external ear normal.      Left Ear: Hearing and external ear normal.      Nose: Nose normal.   Eyes:      Conjunctiva/sclera: Conjunctivae normal.   Cardiovascular:      Rate and Rhythm: Normal rate.   Pulmonary:      Effort: Pulmonary effort is normal.      Breath sounds: No wheezing or rales.   Abdominal:      Palpations: Abdomen is soft.      Tenderness: There is no abdominal tenderness.   Musculoskeletal:      Cervical back: Normal range of motion.   Skin:     Findings: No rash.   Neurological:      Mental Status: The patient is alert and oriented to person, place, and time.   Psychiatric:         Mood and Affect: Mood and affect normal.         Judgment: Judgment normal.       Ortho Exam      Left knee- ROM 0-125 degrees. Stable to varus/valgus stress. Stable to anterior/posterior drawer. Positive EHL, FHL, GS and TA. Sensation " intact to all 5 nerves of the foot. Positive pulses. Neurovascularly intact. Negative straight leg raise knee, Extensor Mechanism  Intact. Negative Lachman's. Negative Valerie's.     Left knee: L knee  Date/Time: 5/5/2023 10:59 AM  Consent given by: patient  Site marked: site marked  Timeout: Immediately prior to procedure a time out was called to verify the correct patient, procedure, equipment, support staff and site/side marked as required   Supporting Documentation  Indications: pain   Procedure Details  Location: knee - L knee  Needle gauge: 21G.  Medications administered: 32 mg Triamcinolone Acetonide 32 MG; 5 mL lidocaine PF 1% 1 %  Patient tolerance: patient tolerated the procedure well with no immediate complications          X-Ray Report:  Left knee X-Ray  Indication: Evaluation of left knee pain  AP/Lateral, Standing and [place] view(s)  Findings: moderate advanced degenerative changes to the left knee. Tricompartmental  Osteophytes.   Prior studies available for comparison: no         Imaging Results (Most Recent)     Procedure Component Value Units Date/Time    XR Knee 3 View Left [[id number]] Resulted: 05/05/23 1021     Updated: 05/05/23 1023           Result Review :       XR Shoulder 2+ View Right    Result Date: 4/9/2023  Narrative: PROCEDURE: XR SHOULDER 2+ VW RIGHT  COMPARISON: None  INDICATIONS: Right shoulder pain with movement, recent fall  FINDINGS:   Advanced degenerative changes are present in the right AC and glenohumeral joints.  No fractures, dislocations or acute osseous abnormalities are identified.  IMPRESSION: Advanced degenerative change.  No acute osseous abnormalities are identified in the right shoulder.   RAGINI LEDESMA MD       Electronically Signed and Approved By: RAGINI LEDESMA MD on 4/09/2023 at 13:53             XR Knee 3 View Right    Result Date: 4/9/2023  Narrative: PROCEDURE: XR KNEE 3 VW RIGHT  COMPARISON: [facility] , , XR KNEE 3 VW RIGHT, 9/06/2022,  10:12.  [facility] , CR, XR KNEE 3 VW RIGHT, 12/06/2022, 11:18.  INDICATIONS: Right knee pain, recent fall  FINDINGS:   Prior total right knee arthroplasty.  The hardware is intact.  No fractures, dislocations or acute osseous abnormalities are identified.  IMPRESSION: Total right knee arthroplasty.  No acute osseous abnormalities are identified.   RAGINI LEDESMA MD       Electronically Signed and Approved By: RAGINI LEDESMA MD on 4/09/2023 at 13:55                      Assessment and Plan     Diagnoses and all orders for this visit:    1. Left knee pain, unspecified chronicity (Primary)  -     XR Knee 3 View Left        Discussed the treatment plan with the patient.  I reviewed the x-rays that were obtained today with the patient. Discussed the risks and benefits of a left knee Zilretta injection. The patient expressed understanding and wished to proceed. He tolerated the injection well.     Call or return if worsening symptoms.    Follow Up     6 weeks      Patient was given instructions and counseling regarding his condition or for health maintenance advice. Please see specific information pulled into the AVS if appropriate.     Scribed for Black Jimenez MD by Rose Hidalgo.  05/05/23   10:33 EDT    I have personally performed the services described in this document as scribed by the above individual and it is both accurate and complete. Black Jimenez MD 05/06/23

## 2023-05-06 RX ORDER — LIDOCAINE HYDROCHLORIDE 10 MG/ML
5 INJECTION, SOLUTION EPIDURAL; INFILTRATION; INTRACAUDAL; PERINEURAL
Status: COMPLETED | OUTPATIENT
Start: 2023-05-05 | End: 2023-05-05

## 2023-05-26 ENCOUNTER — TELEPHONE (OUTPATIENT)
Dept: ORTHOPEDIC SURGERY | Facility: CLINIC | Age: 73
End: 2023-05-26

## 2023-05-26 NOTE — TELEPHONE ENCOUNTER
PATIENT HAS A APT WITH [facility] FOR HIS RT KNEE WAS CALLING TO SEE IF HE COULD GET IN SOMETIME NEXT WEEK WITH DR. GARCIA FOR A GEL INJECTION FOR THE LEFT KNEE. PATIENT STATED DR. GARCIA TOLD HIM AT HIS LAST INJECTION IF THE LEFT KNEE WAS STILL BOTHERING TO MAKE A APT FOR A GEL INJECTION. PATIENT WOULD LIKE A CALL BACK

## 2023-06-06 ENCOUNTER — OFFICE VISIT (OUTPATIENT)
Dept: ORTHOPEDIC SURGERY | Facility: CLINIC | Age: 73
End: 2023-06-06
Payer: MEDICARE

## 2023-06-06 VITALS
BODY MASS INDEX: 36.85 KG/M2 | WEIGHT: 229.28 LBS | OXYGEN SATURATION: 96 % | SYSTOLIC BLOOD PRESSURE: 156 MMHG | DIASTOLIC BLOOD PRESSURE: 70 MMHG | HEIGHT: 66 IN | HEART RATE: 61 BPM

## 2023-06-06 DIAGNOSIS — Z47.89 AFTERCARE FOLLOWING SURGERY OF THE MUSCULOSKELETAL SYSTEM: Primary | ICD-10-CM

## 2023-06-06 DIAGNOSIS — M25.561 RIGHT KNEE PAIN, UNSPECIFIED CHRONICITY: ICD-10-CM

## 2023-06-06 PROCEDURE — 99024 POSTOP FOLLOW-UP VISIT: CPT | Performed by: PHYSICIAN ASSISTANT

## 2023-06-06 PROCEDURE — 1159F MED LIST DOCD IN RCRD: CPT | Performed by: PHYSICIAN ASSISTANT

## 2023-06-06 PROCEDURE — 1160F RVW MEDS BY RX/DR IN RCRD: CPT | Performed by: PHYSICIAN ASSISTANT

## 2023-06-06 ASSESSMENT — KOOS JR
KOOS JR SCORE: 0
KOOS JR SCORE: 100

## 2023-06-06 NOTE — PROGRESS NOTES
"Chief Complaint  Follow-up of the Right Knee and Pain and Follow-up of the Left Knee    Subjective          History of Present Illness      Christopher Finley is a 72 y.o. male  presents to [facility] for     Patient presents with his wife for follow-up evaluation of right total knee arthroplasty, 2022.  He states he is very happy with his right knee replacement, he has full strength range of motion denies need for pain medication or NSAIDs for the right knee, no new complaints.      Allergies   Allergen Reactions    Amoxicillin Hives and Rash     Other reaction(s): rash        Social History     Socioeconomic History    Marital status:    Tobacco Use    Smoking status: Former     Types: Cigarettes     Quit date: 1970     Years since quittin.9    Smokeless tobacco: Never   Vaping Use    Vaping Use: Never used   Substance and Sexual Activity    Alcohol use: Never    Drug use: Never    Sexual activity: Defer        REVIEW OF SYSTEMS    Constitutional: Denies fevers, chills, weight loss  Cardiovascular: Denies chest pain, shortness of breath  Skin: Denies rashes, acute skin changes  Neurologic: Denies headache, loss of consciousness  MSK: Right knee pain      Objective   Vital Signs:   /70   Pulse 61   Ht 167.6 cm (66\")   Wt 104 kg (229 lb 4.5 oz)   SpO2 96%   BMI 37.01 kg/m²     Body mass index is 37.01 kg/m².    Physical Exam    Right knee: Well-healed incision, no erythema, no ecchymosis, no swelling, no effusion, no signs of infection, full extension, flexion 120, stable anterior/posterior drawer, stable to varus/valgus stress.  Nontender to palpation, no pain with range of motion.  5 out of 5 strength, neurovascular intact.    Procedures    Imaging Results (Most Recent)       Procedure Component Value Units Date/Time    XR Knee 3 View Right [[id number]] Resulted: 23 1131     Updated: 23 113    Narrative:      View:AP/Lateral and [place] " view(s)  Site: Right knee  Indication: Right knee pain  Study: X-rays ordered, taken in the office, and reviewed today  X-ray: Intact appearing right total knee arthroplasty, no signs of   hardware failure or loosening, no subsidence or periprosthetic fracture,   good alignment  Comparative data: No comparative data found             Result Review :   The following data was reviewed by: DAI Martinez on 06/06/2023:  Data reviewed : Radiologic studies reviewed by me with the patient and his wife              Assessment and Plan    Diagnoses and all orders for this visit:    1. Aftercare following surgery of right total knee arthroplasty 6/1/2022 (Primary)    2. Right knee pain, unspecified chronicity  -     XR Knee 3 View Right        Discussed diagnosis and treatment options with the patient he was advised to continue activity as tolerated for the right knee follow-up in 1 year for the right knee.  He was recently seen for his left knee and received a Zilretta injection he states this did not help and he would like to follow-up in 2 weeks for a Synvisc injection.  Follow-up in 2 weeks for the left knee    Call or return if worsening symptoms.    Follow Up   Return in about 1 year (around 6/6/2024) for Recheck.  Patient was given instructions and counseling regarding his condition or for health maintenance advice. Please see specific information pulled into the AVS if appropriate.

## 2023-08-11 ENCOUNTER — OFFICE VISIT (OUTPATIENT)
Dept: ORTHOPEDIC SURGERY | Facility: CLINIC | Age: 73
End: 2023-08-11
Payer: MEDICARE

## 2023-08-11 ENCOUNTER — PREP FOR SURGERY (OUTPATIENT)
Dept: OTHER | Facility: HOSPITAL | Age: 73
End: 2023-08-11
Payer: MEDICARE

## 2023-08-11 VITALS — WEIGHT: 227 LBS | HEART RATE: 71 BPM | OXYGEN SATURATION: 95 % | HEIGHT: 67 IN | BODY MASS INDEX: 35.63 KG/M2

## 2023-08-11 DIAGNOSIS — S43.014S ANTERIOR SHOULDER DISLOCATION, RIGHT, SEQUELA: ICD-10-CM

## 2023-08-11 DIAGNOSIS — M17.12 PRIMARY OSTEOARTHRITIS OF LEFT KNEE: Primary | ICD-10-CM

## 2023-08-11 DIAGNOSIS — M77.12 LEFT LATERAL EPICONDYLITIS: ICD-10-CM

## 2023-08-11 DIAGNOSIS — M25.562 LEFT KNEE PAIN, UNSPECIFIED CHRONICITY: ICD-10-CM

## 2023-08-11 DIAGNOSIS — S43.491A BANKART LESION OF RIGHT SHOULDER, INITIAL ENCOUNTER: ICD-10-CM

## 2023-08-11 DIAGNOSIS — Z47.89 AFTERCARE FOLLOWING SURGERY OF THE MUSCULOSKELETAL SYSTEM: Primary | ICD-10-CM

## 2023-08-11 DIAGNOSIS — M17.12 PRIMARY OSTEOARTHRITIS OF LEFT KNEE: ICD-10-CM

## 2023-08-11 PROBLEM — S43.431A BANKART LESION OF RIGHT SHOULDER: Status: ACTIVE | Noted: 2023-08-11

## 2023-08-11 RX ORDER — LIDOCAINE HYDROCHLORIDE 10 MG/ML
1 INJECTION, SOLUTION INFILTRATION; PERINEURAL
Status: COMPLETED | OUTPATIENT
Start: 2023-08-11 | End: 2023-08-11

## 2023-08-11 RX ORDER — CLINDAMYCIN HYDROCHLORIDE 300 MG/1
CAPSULE ORAL
Qty: 2 CAPSULE | Refills: 0 | Status: SHIPPED | OUTPATIENT
Start: 2023-08-11

## 2023-08-11 RX ORDER — TRANEXAMIC ACID 10 MG/ML
1000 INJECTION, SOLUTION INTRAVENOUS ONCE
OUTPATIENT
Start: 2023-08-11 | End: 2023-08-11

## 2023-08-11 RX ORDER — TRIAMCINOLONE ACETONIDE 40 MG/ML
40 INJECTION, SUSPENSION INTRA-ARTICULAR; INTRAMUSCULAR
Status: COMPLETED | OUTPATIENT
Start: 2023-08-11 | End: 2023-08-11

## 2023-08-11 RX ORDER — CEFAZOLIN SODIUM 2 G/100ML
2 INJECTION, SOLUTION INTRAVENOUS ONCE
OUTPATIENT
Start: 2023-08-11 | End: 2023-08-11

## 2023-08-11 RX ADMIN — LIDOCAINE HYDROCHLORIDE 1 ML: 10 INJECTION, SOLUTION INFILTRATION; PERINEURAL at 12:00

## 2023-08-11 RX ADMIN — TRIAMCINOLONE ACETONIDE 40 MG: 40 INJECTION, SUSPENSION INTRA-ARTICULAR; INTRAMUSCULAR at 12:00

## 2023-08-11 NOTE — PROGRESS NOTES
Chief Complaint  Follow-up and Pain of the Left Knee and Follow-up and Pain of the Right Shoulder     Subjective      Christopher Finley presents to [facility] for patient presents with his wife for follow-up evaluation of left knee pain, left knee osteoarthritis, right shoulder pain, right shoulder dislocation also complaining of left elbow pain.  He states his left knee has been bothering him he received a Synvisc injection on 2023 which he states did not help much.  He had a right shoulder injury in April had a right shoulder dislocation, he is here to review the MRI, he had an injection to the right shoulder at last visit which she states did help the shoulder.  He states his pain in the right shoulder is 1 out of 10 he states he has good movement he denies other episodes of instability or dislocation.  He states pain is controlled on the right shoulder and no new complaints of the right shoulder he would like to avoid surgery.  He states left knee is continuing to bother him he has had good results with his right knee replacement and would like to schedule a left knee replacement.  He also states over a year ago he had left elbow pain and his primary care physician gave him a left elbow steroid injection which he states helped.  He points to the lateral elbow as his area of pain and states that he would like to have a left elbow injection..    Allergies   Allergen Reactions    Amoxicillin Hives and Rash     Other reaction(s): rash        Social History     Socioeconomic History    Marital status:    Tobacco Use    Smoking status: Former     Types: Cigarettes     Quit date: 1970     Years since quittin.1    Smokeless tobacco: Never   Vaping Use    Vaping Use: Never used   Substance and Sexual Activity    Alcohol use: Never    Drug use: Never    Sexual activity: Defer        I reviewed the patient's chief complaint, history of present illness, review of systems, past  "medical history, surgical history, family history, social history, medications, and allergy list.     Review of Systems     Constitutional: Denies fevers, chills, weight loss  Cardiovascular: Denies chest pain, shortness of breath  Skin: Denies rashes, acute skin changes  Neurologic: Denies headache, loss of consciousness  MSK: Left elbow pain, right shoulder pain, left knee pain      Vital Signs:   Pulse 71   Ht 170.2 cm (67\")   Wt 103 kg (227 lb)   SpO2 95%   BMI 35.55 kg/mý          Physical Exam  General: Alert. No acute distress    Ortho Exam      Left knee: Skin is intact, no erythema, no ecchymosis, no swelling or signs of infection, extension full, flexion 120, stable to varus/valgus stress, stable anterior/posterior drawer, negative Lachman, negative Valerie, extensor mechanism intact, negative straight leg raise, positive pulses, neurovascular intact    Left elbow: Tender to palpation of left lateral elbow, nontender medial anterior and posterior elbow, mild pain with range of motion, pain with resisted wrist extension, pain with resisted third finger extension.    Right shoulder: Nontender to palpation, no pain with range of motion, active forward elevation 170, active abduction 110, internal rotation to his side, positive impingement testing, crepitus with range of motion 4 out of 5 strength    Left elbow: L elbow  Date/Time: 8/11/2023 12:00 PM  Consent given by: patient  Site marked: site marked  Timeout: Immediately prior to procedure a time out was called to verify the correct patient, procedure, equipment, support staff and site/side marked as required   Supporting Documentation  Indications: pain   Procedure Details  Location: elbow - L elbow  Needle size: 23 G  Medications administered: 1 mL lidocaine 1 %; 40 mg triamcinolone acetonide 40 MG/ML  Patient tolerance: patient tolerated the procedure well with no immediate complications            Imaging Results (Most Recent)       None         "     Result Review :       MRI Shoulder Right Without Contrast    Result Date: 7/22/2023  Narrative: PROCEDURE: MRI SHOULDER RIGHT WO CONTRAST  COMPARISON: [facility], CR, XR SHOULDER 2+ VW RIGHT, 4/09/2023, 13:28.  INDICATIONS: Right shoulder pain      TECHNIQUE: A variety of imaging planes and parameters were utilized for visualization of suspected pathology.  Images were performed without contrast.   FINDINGS:  There appears to be advanced tendinopathy of the distal supraspinatus and infraspinatus tendons.  There is irregular T2 hyperintense signal along the bursal surface of the distal supraspinatus and anterior infraspinatus tendons suspicious for broad partial thickness bursal surface fraying/tear.  There are some small foci of hyperintense signal in the supraspinatus extending toward the articular surface suspicious for more high-grade focal defects with possible small full-thickness perforations.  There is a suspected partial thickness articular surface defect at the mid/posterior infraspinatus insertion.  No large full-thickness defect is seen at this time.  There is a small amount of fluid in the subacromial/subdeltoid bursa which could be related to mild bursitis or small full-thickness perforations.  There is subscapularis tendinopathy with probable partial thickness articular surface tear of the distal tendon fibers.  No full-thickness defect is seen.  The teres minor tendon appears intact.  There is suspected mild diffuse rotator cuff and deltoid muscle atrophy.  No significant focal muscle edema is seen.  Glenohumeral alignment is within normal limits.  There is a small to moderate glenohumeral effusion.  There is suspected diffuse partial thickness cartilage loss of the humeral head and glenoid suggesting moderate osteoarthritis.  There is a fracture of the anterior glenoid involving the anterior articular surface.  On sagittal T1 image 6 of series 5, the anterior glenoid fracture  fragment measures approximately 2.7 x 0.8 cm and appears to involve approximately 25% of the anterior articular surface.  There is mild medial displacement of the fracture fragment.  On the axial T2 images, series 9, there is suggestion of some bridging osseous union between the fracture fragment and remaining glenoid.  There is distortion and diminution of the anterior labrum suggesting degeneration and tear.  There does not appear to be significant surrounding edema at this time.  There is also suspected degeneration and chronic tear of the posterior and and inferior labrum with distortion and diminution.  No significant paralabral cyst is seen.  There is mild depression at the posterior-superior humeral head suggesting mild Hill-Sachs injury without significant focal edema.  No other definite displaced fracture is seen.  There are moderate degenerative changes at the acromioclavicular joint without significant surrounding edema.  There is fluid in the biceps tendon sheath which may be related to joint effusion.  There is suspected biceps tendinopathy without definite findings of high-grade tendon tear.  There is enthesopathy at the greater and lesser tuberosities.  No definite adenopathy is seen.      Impression:   1. Fracture of the anterior glenoid consistent with bony Bankart injury.  There is mild medial displacement of the fracture fragment which appears to be approximately 25% of the articular surface.  There appears to be some bridging osseous union on axial images.  This may be better evaluated with CT. 2. Moderate glenohumeral osteoarthritis with small to moderate joint effusion.  Degeneration and tear of the labrum. 3. Rotator cuff tendinopathy with suspected partial thickness tears of the supraspinatus, infraspinatus, and subscapularis tendons, as above with probable small full-thickness perforations of the supraspinatus.  No large full-thickness defect is seen at this time. 4. Small amount of fluid in  the subacromial/subdeltoid bursa which could be related to rotator cuff defects or mild bursitis. 5. Moderate acromioclavicular joint degeneration.      ROSA PANG MD       Electronically Signed and Approved By: ROSA PANG MD on 7/22/2023 at 8:37                     Assessment and Plan     Diagnoses and all orders for this visit:    1. Aftercare following surgery of the musculoskeletal system (Primary)  -     clindamycin (Cleocin) 300 MG capsule; Take 2 capsules by mouth one hour prior to dental cleaning  Dispense: 2 capsule; Refill: 0    2. Anterior shoulder dislocation, right, sequela    3. Left knee pain, unspecified chronicity    4. Primary osteoarthritis of left knee    5. Left lateral epicondylitis    6. Bankart lesion of right shoulder, initial encounter    Other orders  -     Left elbow: L elbow        Discussed diagnosis and treatment options with the patient, he was advised of treatment options for the elbow including left elbow injection he elected to have this and tolerated well.  He would like to continue activity as tolerated we reviewed his MRI with him he would like to continue conservative treatment for the shoulder and he will follow-up as needed for the shoulder.  Patient would like to schedule left knee replacement, risk benefits procedure and recovery of left total knee arthroplasty were discussed, patient agreed to have surgery scheduled, follow-up 2 weeks postop    Discussed surgery., Risks/benefits discussed with patient including, but not limited to: infection, bleeding, neurovascular damage, re-rupture, aesthetic deformity, need for further surgery, and death., Discussed with patient the implant type being used during surgery and patient understands., Surgery pamphlet given., and Call or return if worsening symptoms.    Follow Up     2 weeks postop      Patient was given instructions and counseling regarding his condition or for health maintenance advice. Please see specific  information pulled into the AVS if appropriate.     Scribed for Black Jimenez MD by Carmina Oh.  08/11/23   12:00 EDT      I have personally performed the services described in this document as scribed by the above individual and it is both accurate and complete. Black Jimenez MD 08/11/23

## 2023-09-05 DIAGNOSIS — Z47.89 AFTERCARE FOLLOWING SURGERY OF THE MUSCULOSKELETAL SYSTEM: ICD-10-CM

## 2023-09-05 RX ORDER — CLINDAMYCIN HYDROCHLORIDE 300 MG/1
CAPSULE ORAL
Qty: 2 CAPSULE | Refills: 2 | Status: SHIPPED | OUTPATIENT
Start: 2023-09-05

## 2023-09-19 ENCOUNTER — TELEPHONE (OUTPATIENT)
Dept: ORTHOPEDIC SURGERY | Facility: CLINIC | Age: 73
End: 2023-09-19
Payer: MEDICARE

## 2023-09-19 NOTE — TELEPHONE ENCOUNTER
SPOKE W/WIFE - SHE WILL HAVE PAT. CALL BACK TO R/S 12/21/23 APPT., AS JOELLE IS OUT OF THE OFFICE    I WENT AHEAD AND CX THIS APPT.      HUB OK TO READ AND R/S W/JOELLE AT NEXT AVAILABILITY AFTER 12/22/23

## 2023-09-25 ENCOUNTER — TELEPHONE (OUTPATIENT)
Dept: ORTHOPEDIC SURGERY | Facility: CLINIC | Age: 73
End: 2023-09-25

## 2023-09-25 NOTE — TELEPHONE ENCOUNTER
PATIENT CONTACTED, SURGERY RESCHEDULED TO 1/17/2024 PER PATIENT'S REQUEST. PATIENT IS AWARE THE HOSPITAL WILL CALL THE DAY PRIOR WITH ARRIVAL TIME FOR SURGERY.     PAT: 1/9/2024 @ 10:45AM (ARRIVE AT 10:15AM TO REGISTER)

## 2023-09-25 NOTE — TELEPHONE ENCOUNTER
Provider: RADHA     Caller: CHYNA YOU     Relationship to Patient: SELF    Phone Number: [phone] (home)       Reason for Call: PATIENT IS WANTING TO TALK TO THE SX  IN REF TO HIS KNEE SURGERY. HE WOULD LIKE TO CHANGE THE DATE.    PATIENT REQUESTING A CALL BACK  PLEASE ADVISE     UNABLE TO WARM TRANSFER

## 2023-12-13 ENCOUNTER — TELEPHONE (OUTPATIENT)
Dept: ORTHOPEDIC SURGERY | Facility: CLINIC | Age: 73
End: 2023-12-13
Payer: MEDICARE

## 2023-12-13 NOTE — TELEPHONE ENCOUNTER
"    Caller: Christopher Finley \"LANIE\"    Relationship to patient: Self    Best call back number: [phone]    Patient is needing: PATIENT WANTED TO INFORM LISS THAT HE MIGHT NEED TO CHANGE KNEE SURGERY SCHEDULED FOR 01/17/2023    "

## 2024-02-23 ENCOUNTER — TELEPHONE (OUTPATIENT)
Dept: ORTHOPEDIC SURGERY | Facility: CLINIC | Age: 74
End: 2024-02-23
Payer: MEDICARE

## 2024-02-23 DIAGNOSIS — Z47.89 AFTERCARE FOLLOWING SURGERY OF THE MUSCULOSKELETAL SYSTEM: Primary | ICD-10-CM

## 2024-02-23 NOTE — TELEPHONE ENCOUNTER
Provider:  DR.JOHN ELIGIO GARCIA    Caller: CHYNA YOU    Relationship to Patient: SELF    Pharmacy: [facility] [phone]    Phone Number:  [phone]    Reason for Call:  PATIENT IS SCHEDULED FOR LEFT TOTAL KNEE REPLACEMENT SURGERY ON 4/3/24. PATIENT IS ASKING FOR AN ANTIBIOTIC FOR A DENTAL CLEANING APPT. NEXT WEEK.    When was the patient last seen: 8/11/23

## 2024-02-26 RX ORDER — CLINDAMYCIN HYDROCHLORIDE 300 MG/1
CAPSULE ORAL
Qty: 2 CAPSULE | Refills: 0 | Status: SHIPPED | OUTPATIENT
Start: 2024-02-26

## 2024-03-14 ENCOUNTER — OFFICE VISIT (OUTPATIENT)
Dept: ORTHOPEDIC SURGERY | Facility: CLINIC | Age: 74
End: 2024-03-14
Payer: MEDICARE

## 2024-03-14 ENCOUNTER — PREP FOR SURGERY (OUTPATIENT)
Dept: OTHER | Facility: HOSPITAL | Age: 74
End: 2024-03-14
Payer: MEDICARE

## 2024-03-14 VITALS
OXYGEN SATURATION: 91 % | BODY MASS INDEX: 36.33 KG/M2 | HEIGHT: 67 IN | SYSTOLIC BLOOD PRESSURE: 145 MMHG | HEART RATE: 54 BPM | DIASTOLIC BLOOD PRESSURE: 80 MMHG | WEIGHT: 231.48 LBS

## 2024-03-14 DIAGNOSIS — Z96.652 AFTERCARE FOLLOWING LEFT KNEE JOINT REPLACEMENT SURGERY: Primary | ICD-10-CM

## 2024-03-14 DIAGNOSIS — Z47.1 AFTERCARE FOLLOWING LEFT KNEE JOINT REPLACEMENT SURGERY: Primary | ICD-10-CM

## 2024-03-14 DIAGNOSIS — M17.12 PRIMARY OSTEOARTHRITIS OF LEFT KNEE: Primary | ICD-10-CM

## 2024-03-14 DIAGNOSIS — M25.562 LEFT KNEE PAIN, UNSPECIFIED CHRONICITY: Primary | ICD-10-CM

## 2024-03-14 DIAGNOSIS — M17.12 PRIMARY OSTEOARTHRITIS OF LEFT KNEE: ICD-10-CM

## 2024-03-14 RX ORDER — TRANEXAMIC ACID 10 MG/ML
1000 INJECTION, SOLUTION INTRAVENOUS ONCE
OUTPATIENT
Start: 2024-03-14 | End: 2024-03-14

## 2024-03-14 RX ORDER — CLINDAMYCIN PHOSPHATE 900 MG/50ML
900 INJECTION, SOLUTION INTRAVENOUS ONCE
OUTPATIENT
Start: 2024-03-14 | End: 2024-03-14

## 2024-03-14 NOTE — H&P (VIEW-ONLY)
Chief Complaint  Pain and Follow-up of the Left Knee    Subjective          History of Present Illness      Christopher Finley is a 73 y.o. male  presents to [facility] for     Patient presents with his wife for follow-up evaluation of left knee pain, left knee osteoarthritis.  Patient has been treating his left knee with conservative treatment he has been holding off on knee replacement but he is candidate for knee replacement he has a history of right knee replacement in  and he is happy with his right knee and he is here today to discuss left total knee arthroplasty.  He has a surgery scheduled for  his insurance required this visit.  Patient points anterior medial knee as area of worst pain he admits to pain with walking standing for long periods he admits to pain with bending and straightening the knee with certain activities.  He was going to have knee replacement for the left knee sooner but had to reschedule it.      Allergies   Allergen Reactions    Amoxicillin Hives, Rash and Other (See Comments)     Other reaction(s): rash        Social History     Socioeconomic History    Marital status:    Tobacco Use    Smoking status: Former     Current packs/day: 0.00     Types: Cigarettes     Quit date: 1970     Years since quittin.7    Smokeless tobacco: Never   Vaping Use    Vaping status: Never Used   Substance and Sexual Activity    Alcohol use: Never    Drug use: Never    Sexual activity: Defer        REVIEW OF SYSTEMS    Constitutional: Awake alert and oriented x3, no acute distress, denies fevers, chills, weight loss  Respiratory: No respiratory distress  Vascular: Brisk cap refill, Intact distal pulses, No cyanosis, compartments soft with no signs or symptoms of compartment syndrome or DVT.   Cardiovascular: Denies chest pain, shortness of breath  Skin: Denies rashes, acute skin changes  Neurologic: Denies headache, loss of consciousness  MSK: Left knee  "pain      Objective   Vital Signs:   /80   Pulse 54   Ht 170.2 cm (67\")   Wt 105 kg (231 lb 7.7 oz)   SpO2 91%   BMI 36.26 kg/m²     Body mass index is 36.26 kg/m².    Physical Exam       Left knee: Skin is intact, no erythema, no ecchymosis, no swelling or signs of infection, extension full, flexion 120, stable to varus/valgus stress, stable anterior/posterior drawer, negative Lachman, negative Valerie, extensor mechanism intact, negative straight leg raise, positive pulses, neurovascular intact, tender to palpation anterior medial knee along the joint line      Procedures    Imaging Results (Most Recent)       Procedure Component Value Units Date/Time    XR Knee 3 View Left [[id number]] Resulted: 03/14/24 1011     Updated: 03/14/24 1018             Result Review :   The following data was reviewed by: DAI Martinez on 03/14/2024:  Data reviewed : Radiologic studies reviewed by me with the patient              Assessment and Plan    Diagnoses and all orders for this visit:    1. Left knee pain, unspecified chronicity (Primary)  -     XR Knee 3 View Left    2. Primary osteoarthritis of left knee        Reviewed x-rays with the patient Dr. Jimenez also notes patient reviewed x-rays with him discussed risk benefits procedure recovery of left knee replacement we discussed robotic assisted versus manual knee replacement patient had a right knee replaced without the robot and patient is requesting a left knee replacement without the robot assistance.  Follow-up 2 weeks postop    Discussed surgery., Risks/benefits discussed with patient including, but not limited to: infection, bleeding, neurovascular damage, re-rupture, aesthetic deformity, need for further surgery, and death., Discussed with patient the implant type being used during surgery and patient understands., Surgery pamphlet given., Call or return if worsening symptoms., DME order for a 3 in 1 given today due to patient will be " confined to one room/level of the home that does not offer a toilet during postop recovery. , I am requesting authorization for the camron® System to reduce the patient's pain and aid in their recovery. I believe the camron® Sport System will have positive impact on my patient's quality of life. I would appreciate prompt review of the information and authorization of the camron® System.  camron® Sport utilizes ultrasonic waves that penetrate 5 cm into the tissue, which increases circulation, oxygen and nutrient delivery, and the removal of waste products, such as lactic acid, from the site of the musculoskeletal injury.  The camron® System is a new FDA approved (FDA 510K 354530) treatment modality that that has been shown in recent clinical trials sponsored by the NIH (National Institutes of Health), the DOD (Department of Defense) and [facility] the research arm of the NASA (National [employer]eron[employer]ics and [employer]) to reduce patient's pain, increase mobility and speed recovery. , I am ordering the use of the Nice1 cold therapy machine for 60 days post-op as part of an opioid-sparing approach to help manage pain and edema.  I feel this is medically necessary for the best care for this patient.   , and IFC can help extend pain relief and hopefully reduce need for pain medication. TENS is used to control break through pain. NMES is used to help and strengthen weak muscles and prevent atrophy.    Follow Up   Return for 2 WEEKS POST OP.  Patient was given instructions and counseling regarding his condition or for health maintenance advice. Please see specific information pulled into the AVS if appropriate.       EMR Dragon/Transcription disclaimer:  Much of this encounter note is an electronic transcription/translation of spoken language to printed text, aka voice recognition.  The electronic translation of spoken language may permit erroneous or at times nonsensical words or phrases to be inadvertently transcribed; although I have  reviewed the note for such errors, some may still exist so please interpret based on surrounding text content.

## 2024-03-14 NOTE — PROGRESS NOTES
Chief Complaint  Pain and Follow-up of the Left Knee    Subjective          History of Present Illness      Christopher Finley is a 73 y.o. male  presents to [facility] for     Patient presents with his wife for follow-up evaluation of left knee pain, left knee osteoarthritis.  Patient has been treating his left knee with conservative treatment he has been holding off on knee replacement but he is candidate for knee replacement he has a history of right knee replacement in  and he is happy with his right knee and he is here today to discuss left total knee arthroplasty.  He has a surgery scheduled for  his insurance required this visit.  Patient points anterior medial knee as area of worst pain he admits to pain with walking standing for long periods he admits to pain with bending and straightening the knee with certain activities.  He was going to have knee replacement for the left knee sooner but had to reschedule it.      Allergies   Allergen Reactions    Amoxicillin Hives, Rash and Other (See Comments)     Other reaction(s): rash        Social History     Socioeconomic History    Marital status:    Tobacco Use    Smoking status: Former     Current packs/day: 0.00     Types: Cigarettes     Quit date: 1970     Years since quittin.7    Smokeless tobacco: Never   Vaping Use    Vaping status: Never Used   Substance and Sexual Activity    Alcohol use: Never    Drug use: Never    Sexual activity: Defer        REVIEW OF SYSTEMS    Constitutional: Awake alert and oriented x3, no acute distress, denies fevers, chills, weight loss  Respiratory: No respiratory distress  Vascular: Brisk cap refill, Intact distal pulses, No cyanosis, compartments soft with no signs or symptoms of compartment syndrome or DVT.   Cardiovascular: Denies chest pain, shortness of breath  Skin: Denies rashes, acute skin changes  Neurologic: Denies headache, loss of consciousness  MSK: Left knee  "pain      Objective   Vital Signs:   /80   Pulse 54   Ht 170.2 cm (67\")   Wt 105 kg (231 lb 7.7 oz)   SpO2 91%   BMI 36.26 kg/m²     Body mass index is 36.26 kg/m².    Physical Exam       Left knee: Skin is intact, no erythema, no ecchymosis, no swelling or signs of infection, extension full, flexion 120, stable to varus/valgus stress, stable anterior/posterior drawer, negative Lachman, negative Valerie, extensor mechanism intact, negative straight leg raise, positive pulses, neurovascular intact, tender to palpation anterior medial knee along the joint line      Procedures    Imaging Results (Most Recent)       Procedure Component Value Units Date/Time    XR Knee 3 View Left [[id number]] Resulted: 03/14/24 1011     Updated: 03/14/24 1018             Result Review :   The following data was reviewed by: DAI Martinez on 03/14/2024:  Data reviewed : Radiologic studies reviewed by me with the patient              Assessment and Plan    Diagnoses and all orders for this visit:    1. Left knee pain, unspecified chronicity (Primary)  -     XR Knee 3 View Left    2. Primary osteoarthritis of left knee        Reviewed x-rays with the patient Dr. Jimenez also notes patient reviewed x-rays with him discussed risk benefits procedure recovery of left knee replacement we discussed robotic assisted versus manual knee replacement patient had a right knee replaced without the robot and patient is requesting a left knee replacement without the robot assistance.  Follow-up 2 weeks postop    Discussed surgery., Risks/benefits discussed with patient including, but not limited to: infection, bleeding, neurovascular damage, re-rupture, aesthetic deformity, need for further surgery, and death., Discussed with patient the implant type being used during surgery and patient understands., Surgery pamphlet given., Call or return if worsening symptoms., DME order for a 3 in 1 given today due to patient will be " confined to one room/level of the home that does not offer a toilet during postop recovery. , I am requesting authorization for the camron® System to reduce the patient's pain and aid in their recovery. I believe the camron® Sport System will have positive impact on my patient's quality of life. I would appreciate prompt review of the information and authorization of the camron® System.  camron® Sport utilizes ultrasonic waves that penetrate 5 cm into the tissue, which increases circulation, oxygen and nutrient delivery, and the removal of waste products, such as lactic acid, from the site of the musculoskeletal injury.  The camron® System is a new FDA approved (FDA 510K 641394) treatment modality that that has been shown in recent clinical trials sponsored by the NIH (National Institutes of Health), the DOD (Department of Defense) and [facility] the research arm of the NASA (National Aeronautics and [employer] Administration) to reduce patient's pain, increase mobility and speed recovery. , I am ordering the use of the Nice1 cold therapy machine for 60 days post-op as part of an opioid-sparing approach to help manage pain and edema.  I feel this is medically necessary for the best care for this patient.   , and IFC can help extend pain relief and hopefully reduce need for pain medication. TENS is used to control break through pain. NMES is used to help and strengthen weak muscles and prevent atrophy.    Follow Up   Return for 2 WEEKS POST OP.  Patient was given instructions and counseling regarding his condition or for health maintenance advice. Please see specific information pulled into the AVS if appropriate.       EMR Dragon/Transcription disclaimer:  Much of this encounter note is an electronic transcription/translation of spoken language to printed text, aka voice recognition.  The electronic translation of spoken language may permit erroneous or at times nonsensical words or phrases to be inadvertently transcribed; although I have  reviewed the note for such errors, some may still exist so please interpret based on surrounding text content.   Implemented All Universal Safety Interventions:  [place] to call system. Call bell, personal items and telephone within reach. Instruct patient to call for assistance. Room bathroom lighting operational. Non-slip footwear when patient is off stretcher. Physically safe environment: no spills, clutter or unnecessary equipment. Stretcher in lowest position, wheels locked, appropriate side rails in place.

## 2024-03-20 DIAGNOSIS — Z47.89 AFTERCARE FOLLOWING SURGERY OF THE MUSCULOSKELETAL SYSTEM: ICD-10-CM

## 2024-03-20 RX ORDER — CLINDAMYCIN HYDROCHLORIDE 300 MG/1
CAPSULE ORAL
Qty: 2 CAPSULE | Refills: 0 | Status: SHIPPED | OUTPATIENT
Start: 2024-03-20

## 2024-03-21 DIAGNOSIS — Z01.818 ENCOUNTER FOR PREADMISSION TESTING: Primary | ICD-10-CM

## 2024-03-25 ENCOUNTER — PRE-ADMISSION TESTING (OUTPATIENT)
Dept: PREADMISSION TESTING | Facility: HOSPITAL | Age: 74
End: 2024-03-25
Payer: MEDICARE

## 2024-03-25 VITALS
HEIGHT: 66 IN | TEMPERATURE: 98.7 F | DIASTOLIC BLOOD PRESSURE: 80 MMHG | BODY MASS INDEX: 37.34 KG/M2 | RESPIRATION RATE: 16 BRPM | WEIGHT: 232.37 LBS | OXYGEN SATURATION: 96 % | HEART RATE: 56 BPM | SYSTOLIC BLOOD PRESSURE: 152 MMHG

## 2024-03-25 DIAGNOSIS — M17.12 PRIMARY OSTEOARTHRITIS OF LEFT KNEE: ICD-10-CM

## 2024-03-25 DIAGNOSIS — Z01.818 ENCOUNTER FOR PREADMISSION TESTING: ICD-10-CM

## 2024-03-25 LAB
ALBUMIN SERPL-MCNC: 4 G/DL (ref 3.5–5.2)
ALBUMIN/GLOB SERPL: 1.3 G/DL
ALP SERPL-CCNC: 119 U/L (ref 39–117)
ALT SERPL W P-5'-P-CCNC: 14 U/L (ref 1–41)
ANION GAP SERPL CALCULATED.3IONS-SCNC: 9.5 MMOL/L (ref 5–15)
AST SERPL-CCNC: 13 U/L (ref 1–40)
BASOPHILS # BLD AUTO: 0.03 10*3/MM3 (ref 0–0.2)
BASOPHILS NFR BLD AUTO: 0.4 % (ref 0–1.5)
BILIRUB SERPL-MCNC: 0.3 MG/DL (ref 0–1.2)
BUN SERPL-MCNC: 16 MG/DL (ref 8–23)
BUN/CREAT SERPL: 19 (ref 7–25)
CALCIUM SPEC-SCNC: 9.7 MG/DL (ref 8.6–10.5)
CHLORIDE SERPL-SCNC: 106 MMOL/L (ref 98–107)
CO2 SERPL-SCNC: 25.5 MMOL/L (ref 22–29)
CREAT SERPL-MCNC: 0.84 MG/DL (ref 0.76–1.27)
DEPRECATED RDW RBC AUTO: 41.9 FL (ref 37–54)
EGFRCR SERPLBLD CKD-EPI 2021: 91.5 ML/MIN/1.73
EOSINOPHIL # BLD AUTO: 0.12 10*3/MM3 (ref 0–0.4)
EOSINOPHIL NFR BLD AUTO: 1.7 % (ref 0.3–6.2)
ERYTHROCYTE [DISTWIDTH] IN BLOOD BY AUTOMATED COUNT: 13.2 % (ref 12.3–15.4)
GLOBULIN UR ELPH-MCNC: 3 GM/DL
GLUCOSE SERPL-MCNC: 106 MG/DL (ref 65–99)
HBA1C MFR BLD: 5.9 % (ref 4.8–5.6)
HCT VFR BLD AUTO: 40 % (ref 37.5–51)
HGB BLD-MCNC: 14 G/DL (ref 13–17.7)
IMM GRANULOCYTES # BLD AUTO: 0.02 10*3/MM3 (ref 0–0.05)
IMM GRANULOCYTES NFR BLD AUTO: 0.3 % (ref 0–0.5)
INR PPP: 1.02 (ref 0.86–1.15)
LYMPHOCYTES # BLD AUTO: 2.31 10*3/MM3 (ref 0.7–3.1)
LYMPHOCYTES NFR BLD AUTO: 32.5 % (ref 19.6–45.3)
MCH RBC QN AUTO: 30.8 PG (ref 26.6–33)
MCHC RBC AUTO-ENTMCNC: 35 G/DL (ref 31.5–35.7)
MCV RBC AUTO: 88.1 FL (ref 79–97)
MONOCYTES # BLD AUTO: 0.65 10*3/MM3 (ref 0.1–0.9)
MONOCYTES NFR BLD AUTO: 9.2 % (ref 5–12)
NEUTROPHILS NFR BLD AUTO: 3.97 10*3/MM3 (ref 1.7–7)
NEUTROPHILS NFR BLD AUTO: 55.9 % (ref 42.7–76)
NRBC BLD AUTO-RTO: 0 /100 WBC (ref 0–0.2)
PLATELET # BLD AUTO: 165 10*3/MM3 (ref 140–450)
PMV BLD AUTO: 9.5 FL (ref 6–12)
POTASSIUM SERPL-SCNC: 4.2 MMOL/L (ref 3.5–5.2)
PROT SERPL-MCNC: 7 G/DL (ref 6–8.5)
PROTHROMBIN TIME: 13.6 SECONDS (ref 11.8–14.9)
QT INTERVAL: 450 MS
QTC INTERVAL: 440 MS
RBC # BLD AUTO: 4.54 10*6/MM3 (ref 4.14–5.8)
SODIUM SERPL-SCNC: 141 MMOL/L (ref 136–145)
WBC NRBC COR # BLD AUTO: 7.1 10*3/MM3 (ref 3.4–10.8)

## 2024-03-25 PROCEDURE — 93010 ELECTROCARDIOGRAM REPORT: CPT | Performed by: INTERNAL MEDICINE

## 2024-03-25 PROCEDURE — 36415 COLL VENOUS BLD VENIPUNCTURE: CPT

## 2024-03-25 PROCEDURE — 93005 ELECTROCARDIOGRAM TRACING: CPT

## 2024-03-25 PROCEDURE — 85610 PROTHROMBIN TIME: CPT

## 2024-03-25 PROCEDURE — 80053 COMPREHEN METABOLIC PANEL: CPT

## 2024-03-25 PROCEDURE — 83036 HEMOGLOBIN GLYCOSYLATED A1C: CPT

## 2024-03-25 PROCEDURE — 85025 COMPLETE CBC W/AUTO DIFF WBC: CPT

## 2024-03-25 RX ORDER — CYPROHEPTADINE HYDROCHLORIDE 4 MG/1
4 TABLET ORAL NIGHTLY
COMMUNITY

## 2024-03-25 RX ORDER — DILTIAZEM HYDROCHLORIDE 180 MG/1
180 CAPSULE, EXTENDED RELEASE ORAL NIGHTLY
COMMUNITY

## 2024-03-25 RX ORDER — ROSUVASTATIN CALCIUM 20 MG/1
20 TABLET, COATED ORAL DAILY
COMMUNITY

## 2024-03-25 NOTE — DISCHARGE INSTRUCTIONS
IMPORTANT INSTRUCTIONS - PRE-ADMISSION TESTING  DO NOT EAT OR CHEW anything after midnight the night before your procedure.    You may have CLEAR liquids up to __3____ hours prior to ARRIVAL time.   Take the following medications the morning of your procedure with JUST A SIP OF WATER:  __  BUPROPION, REFRESH EYE DROP IF NEEDED, PANTOPRAZOLE, ROSUVASTATIN_____________________________________________________________________________________________________________________________________________________________________________________    DO NOT BRING your medications to the hospital with you, UNLESS something has changed since your PRE-Admission Testing appointment.  AFTER 3/27/24 Hold all vitamins, supplements, and NSAIDS INCLUDING ASPIRIN, B12, VITAMIN D(Non- steroidal anti-inflammatory meds) for one week prior to surgery (you MAY take Tylenol or Acetaminophen).  If you are diabetic, check your blood sugar the morning of your procedure. If it is less than 70 or if you are feeling symptomatic, call the following number for further instructions: [phone]_.  Use your inhalers/nebulizers as usual, the morning of your procedure. BRING YOUR INHALERS with you.   Bring your CPAP or BIPAP to hospital, ONLY IF YOU WILL BE SPENDING THE NIGHT.   Make sure you have a ride home and have someone who will stay with you the day of your procedure after you go home.  If you have any questions, please call your Pre-Admission Testing Nurse, TROY_______________ at [phone]__.   Per anesthesia request, do not smoke for 24 hours before your procedure or as instructed by your surgeon.    WILL CALL ON 4/2/24 NORMALLY BETWEEN 1 AND 4 PM TO GIVE OFFICIAL ARRIVAL TIME FOR DAY OF SURGERY  DRINK 20 OZ GATORADE OR POWERADE  NO RED UP TO 3 HOURS PRIOR TO ARRIVAL. REFER TO CLEAR LIQUID DIET SHEET FOR OTHER APPROVED CLEAR LIQUIDS CAN HAVE SIPS OF UP UNTIL 3 HOURS PRIOR TO ARRIVAL NO RED  REFER TO PAGE 9 IN TOTAL JOINT BOOK  FOR BATHING INSTRUCTIONS. NO JEWELRY OF ANY TYPE  COME TO SAME AREA HAD PAT APPT ENTRANCE A, ELEVATOR A, 3RD FLOOR DAY OF PROCEDURE  CASH, CHECK, OR CARD FOR MEDS TO BED IF INDICATED AT DISCHARGE  BRING CPAP WITH YOU  IF DEVELOP OPEN AREA, RASH, OR BECOME SICK PRIOR TO PROCEDURE PLEASE LET THIS NURSE AND [facility] KNOW. IF ANY OF THESE THINGS OCCUR NIGHT PRIOR TO PROCEDURE PLEASE CALL [phone] AT 0530 AM TO MAKE AWARE  NO METFORMIN AFTER 6 PM DAY BEFORE PROCEDURE 4/2/24

## 2024-03-26 ENCOUNTER — ANESTHESIA EVENT (OUTPATIENT)
Dept: PERIOP | Facility: HOSPITAL | Age: 74
End: 2024-03-26
Payer: MEDICARE

## 2024-03-26 NOTE — TELEPHONE ENCOUNTER
CALLED AND DID POST OP PHONE CALL---QUESTIONS ANSWERED.   hysteroscopy D&C  Discussed the risks of surgery including the risks of bleeding, infection, deep vein thrombosis, and surgical injuries to internal organs including but not limited to the bowels, bladder, rectum, and female reproductive organs. The patient understands the risks; any and all questions were answered to the patient's satisfaction.           Sofya Christensen MD  [facility]

## 2024-04-03 ENCOUNTER — ANESTHESIA EVENT CONVERTED (OUTPATIENT)
Dept: ANESTHESIOLOGY | Facility: HOSPITAL | Age: 74
End: 2024-04-03
Payer: MEDICARE

## 2024-04-03 ENCOUNTER — ANESTHESIA (OUTPATIENT)
Dept: PERIOP | Facility: HOSPITAL | Age: 74
End: 2024-04-03
Payer: MEDICARE

## 2024-04-03 ENCOUNTER — APPOINTMENT (OUTPATIENT)
Dept: GENERAL RADIOLOGY | Facility: HOSPITAL | Age: 74
End: 2024-04-03
Payer: MEDICARE

## 2024-04-03 ENCOUNTER — HOSPITAL ENCOUNTER (OUTPATIENT)
Facility: HOSPITAL | Age: 74
Discharge: HOME | End: 2024-04-04
Attending: ORTHOPAEDIC SURGERY | Admitting: ORTHOPAEDIC SURGERY
Payer: MEDICARE

## 2024-04-03 DIAGNOSIS — Z78.9 DECREASED ACTIVITIES OF DAILY LIVING (ADL): ICD-10-CM

## 2024-04-03 DIAGNOSIS — R26.2 DIFFICULTY IN WALKING: Primary | ICD-10-CM

## 2024-04-03 DIAGNOSIS — Z96.652 S/P TOTAL KNEE ARTHROPLASTY, LEFT: ICD-10-CM

## 2024-04-03 LAB
GLUCOSE BLDC GLUCOMTR-MCNC: 107 MG/DL (ref 70–99)
GLUCOSE BLDC GLUCOMTR-MCNC: 146 MG/DL (ref 70–99)
GLUCOSE BLDC GLUCOMTR-MCNC: 160 MG/DL (ref 70–99)
GLUCOSE BLDC GLUCOMTR-MCNC: 193 MG/DL (ref 70–99)

## 2024-04-03 PROCEDURE — 25010000002 CLINDAMYCIN 900 MG/50ML SOLUTION: Performed by: PHYSICIAN ASSISTANT

## 2024-04-03 PROCEDURE — 82948 REAGENT STRIP/BLOOD GLUCOSE: CPT

## 2024-04-03 PROCEDURE — 63710000001 CARBAMAZEPINE 200 MG TABLET: Performed by: INTERNAL MEDICINE

## 2024-04-03 PROCEDURE — 25010000002 MORPHINE PER 10 MG: Performed by: ORTHOPAEDIC SURGERY

## 2024-04-03 PROCEDURE — 63710000001 ACETAMINOPHEN EXTRA STRENGTH 500 MG TABLET: Performed by: ORTHOPAEDIC SURGERY

## 2024-04-03 PROCEDURE — 82948 REAGENT STRIP/BLOOD GLUCOSE: CPT | Performed by: NURSE ANESTHETIST, CERTIFIED REGISTERED

## 2024-04-03 PROCEDURE — 63710000001 PANTOPRAZOLE 40 MG TABLET DELAYED-RELEASE: Performed by: INTERNAL MEDICINE

## 2024-04-03 PROCEDURE — 63710000001 DILTIAZEM CD 180 MG CAPSULE SUSTAINED-RELEASE 24 HR: Performed by: INTERNAL MEDICINE

## 2024-04-03 PROCEDURE — 25010000002 ROPIVACAINE PER 1 MG: Performed by: ORTHOPAEDIC SURGERY

## 2024-04-03 PROCEDURE — A9270 NON-COVERED ITEM OR SERVICE: HCPCS | Performed by: ORTHOPAEDIC SURGERY

## 2024-04-03 PROCEDURE — 25010000002 SUGAMMADEX 200 MG/2ML SOLUTION: Performed by: NURSE ANESTHETIST, CERTIFIED REGISTERED

## 2024-04-03 PROCEDURE — A9270 NON-COVERED ITEM OR SERVICE: HCPCS | Performed by: INTERNAL MEDICINE

## 2024-04-03 PROCEDURE — 25010000002 ONDANSETRON PER 1 MG: Performed by: NURSE ANESTHETIST, CERTIFIED REGISTERED

## 2024-04-03 PROCEDURE — 73560 X-RAY EXAM OF KNEE 1 OR 2: CPT

## 2024-04-03 PROCEDURE — 94799 UNLISTED PULMONARY SVC/PX: CPT

## 2024-04-03 PROCEDURE — 25010000002 EPINEPHRINE 1 MG/ML SOLUTION: Performed by: ORTHOPAEDIC SURGERY

## 2024-04-03 PROCEDURE — 25010000002 KETOROLAC TROMETHAMINE PER 15 MG: Performed by: ORTHOPAEDIC SURGERY

## 2024-04-03 PROCEDURE — 25010000002 ROPIVACAINE PER 1 MG: Performed by: ANESTHESIOLOGY

## 2024-04-03 PROCEDURE — 97161 PT EVAL LOW COMPLEX 20 MIN: CPT

## 2024-04-03 PROCEDURE — 25010000002 CEFAZOLIN SODIUM 2 G RECONSTITUTED SOLUTION: Performed by: ORTHOPAEDIC SURGERY

## 2024-04-03 PROCEDURE — 25010000002 HYDROMORPHONE 1 MG/ML SOLUTION: Performed by: NURSE ANESTHETIST, CERTIFIED REGISTERED

## 2024-04-03 PROCEDURE — 27447 TOTAL KNEE ARTHROPLASTY: CPT | Performed by: ORTHOPAEDIC SURGERY

## 2024-04-03 PROCEDURE — C1776 JOINT DEVICE (IMPLANTABLE): HCPCS | Performed by: ORTHOPAEDIC SURGERY

## 2024-04-03 PROCEDURE — 25010000002 FENTANYL CITRATE (PF) 50 MCG/ML SOLUTION: Performed by: NURSE ANESTHETIST, CERTIFIED REGISTERED

## 2024-04-03 PROCEDURE — 25810000003 LACTATED RINGERS PER 1000 ML: Performed by: ANESTHESIOLOGY

## 2024-04-03 PROCEDURE — 25010000002 MIDAZOLAM PER 1MG: Performed by: ANESTHESIOLOGY

## 2024-04-03 PROCEDURE — 94761 N-INVAS EAR/PLS OXIMETRY MLT: CPT

## 2024-04-03 PROCEDURE — C1713 ANCHOR/SCREW BN/BN,TIS/BN: HCPCS | Performed by: ORTHOPAEDIC SURGERY

## 2024-04-03 PROCEDURE — 25010000002 DEXAMETHASONE PER 1 MG: Performed by: NURSE ANESTHETIST, CERTIFIED REGISTERED

## 2024-04-03 PROCEDURE — 97110 THERAPEUTIC EXERCISES: CPT

## 2024-04-03 PROCEDURE — 25010000002 PROPOFOL 10 MG/ML EMULSION: Performed by: NURSE ANESTHETIST, CERTIFIED REGISTERED

## 2024-04-03 PROCEDURE — 25010000002 GLYCOPYRROLATE 0.2 MG/ML SOLUTION: Performed by: NURSE ANESTHETIST, CERTIFIED REGISTERED

## 2024-04-03 PROCEDURE — 25810000003 SODIUM CHLORIDE 0.9 % SOLUTION: Performed by: ORTHOPAEDIC SURGERY

## 2024-04-03 DEVICE — CAP TOTL KN CMT PRIMARY: Type: IMPLANTABLE DEVICE | Site: KNEE | Status: FUNCTIONAL

## 2024-04-03 DEVICE — CMT BONE PALACOS R HI/VISC 1X40: Type: IMPLANTABLE DEVICE | Site: KNEE | Status: FUNCTIONAL

## 2024-04-03 DEVICE — STEM TIB/KN PERSONA CMT 5D SZE LT: Type: IMPLANTABLE DEVICE | Site: KNEE | Status: FUNCTIONAL

## 2024-04-03 DEVICE — IMPLANTABLE DEVICE: Type: IMPLANTABLE DEVICE | Site: KNEE | Status: FUNCTIONAL

## 2024-04-03 DEVICE — COMP FEM/KN PERSONA CR CMT COCR STD SZ8 LT: Type: IMPLANTABLE DEVICE | Site: KNEE | Status: FUNCTIONAL

## 2024-04-03 DEVICE — ART/SRF KN PERSONA/VE PS EF 8TO11 10MM LT: Type: IMPLANTABLE DEVICE | Site: KNEE | Status: FUNCTIONAL

## 2024-04-03 RX ORDER — NALOXONE HCL 0.4 MG/ML
0.4 VIAL (ML) INJECTION
Status: DISCONTINUED | OUTPATIENT
Start: 2024-04-03 | End: 2024-04-04 | Stop reason: HOSPADM

## 2024-04-03 RX ORDER — EPHEDRINE SULFATE 50 MG/ML
INJECTION INTRAVENOUS AS NEEDED
Status: DISCONTINUED | OUTPATIENT
Start: 2024-04-03 | End: 2024-04-03 | Stop reason: SURG

## 2024-04-03 RX ORDER — MIDAZOLAM HYDROCHLORIDE 2 MG/2ML
2 INJECTION, SOLUTION INTRAMUSCULAR; INTRAVENOUS ONCE
Status: COMPLETED | OUTPATIENT
Start: 2024-04-03 | End: 2024-04-03

## 2024-04-03 RX ORDER — ROPIVACAINE HYDROCHLORIDE 5 MG/ML
INJECTION, SOLUTION EPIDURAL; INFILTRATION; PERINEURAL
Status: COMPLETED | OUTPATIENT
Start: 2024-04-03 | End: 2024-04-03

## 2024-04-03 RX ORDER — ROCURONIUM BROMIDE 10 MG/ML
INJECTION, SOLUTION INTRAVENOUS AS NEEDED
Status: DISCONTINUED | OUTPATIENT
Start: 2024-04-03 | End: 2024-04-03 | Stop reason: SURG

## 2024-04-03 RX ORDER — SODIUM CHLORIDE 0.9 % (FLUSH) 0.9 %
10 SYRINGE (ML) INJECTION AS NEEDED
Status: DISCONTINUED | OUTPATIENT
Start: 2024-04-03 | End: 2024-04-03 | Stop reason: HOSPADM

## 2024-04-03 RX ORDER — TRANEXAMIC ACID 10 MG/ML
1000 INJECTION, SOLUTION INTRAVENOUS ONCE
Status: COMPLETED | OUTPATIENT
Start: 2024-04-03 | End: 2024-04-03

## 2024-04-03 RX ORDER — OXYCODONE AND ACETAMINOPHEN 7.5; 325 MG/1; MG/1
2 TABLET ORAL EVERY 4 HOURS PRN
Status: DISCONTINUED | OUTPATIENT
Start: 2024-04-03 | End: 2024-04-04 | Stop reason: HOSPADM

## 2024-04-03 RX ORDER — ONDANSETRON 2 MG/ML
4 INJECTION INTRAMUSCULAR; INTRAVENOUS EVERY 6 HOURS PRN
Status: DISCONTINUED | OUTPATIENT
Start: 2024-04-03 | End: 2024-04-04 | Stop reason: HOSPADM

## 2024-04-03 RX ORDER — MEPERIDINE HYDROCHLORIDE 25 MG/ML
12.5 INJECTION INTRAMUSCULAR; INTRAVENOUS; SUBCUTANEOUS
Status: DISCONTINUED | OUTPATIENT
Start: 2024-04-03 | End: 2024-04-03 | Stop reason: HOSPADM

## 2024-04-03 RX ORDER — DEXMEDETOMIDINE HYDROCHLORIDE 100 UG/ML
INJECTION, SOLUTION INTRAVENOUS AS NEEDED
Status: DISCONTINUED | OUTPATIENT
Start: 2024-04-03 | End: 2024-04-03 | Stop reason: SURG

## 2024-04-03 RX ORDER — SODIUM CHLORIDE 9 MG/ML
100 INJECTION, SOLUTION INTRAVENOUS CONTINUOUS
Status: DISCONTINUED | OUTPATIENT
Start: 2024-04-03 | End: 2024-04-04 | Stop reason: HOSPADM

## 2024-04-03 RX ORDER — GLYCOPYRROLATE 0.2 MG/ML
INJECTION INTRAMUSCULAR; INTRAVENOUS AS NEEDED
Status: DISCONTINUED | OUTPATIENT
Start: 2024-04-03 | End: 2024-04-03 | Stop reason: SURG

## 2024-04-03 RX ORDER — PROMETHAZINE HYDROCHLORIDE 25 MG/1
25 SUPPOSITORY RECTAL ONCE AS NEEDED
Status: DISCONTINUED | OUTPATIENT
Start: 2024-04-03 | End: 2024-04-03 | Stop reason: HOSPADM

## 2024-04-03 RX ORDER — PROPOFOL 10 MG/ML
VIAL (ML) INTRAVENOUS AS NEEDED
Status: DISCONTINUED | OUTPATIENT
Start: 2024-04-03 | End: 2024-04-03 | Stop reason: SURG

## 2024-04-03 RX ORDER — PANTOPRAZOLE SODIUM 40 MG/1
40 TABLET, DELAYED RELEASE ORAL DAILY
Status: DISCONTINUED | OUTPATIENT
Start: 2024-04-03 | End: 2024-04-04 | Stop reason: HOSPADM

## 2024-04-03 RX ORDER — ACETAMINOPHEN 500 MG
1000 TABLET ORAL EVERY 8 HOURS
Qty: 12 TABLET | Refills: 0 | Status: DISCONTINUED | OUTPATIENT
Start: 2024-04-03 | End: 2024-04-04 | Stop reason: HOSPADM

## 2024-04-03 RX ORDER — SODIUM CHLORIDE, SODIUM LACTATE, POTASSIUM CHLORIDE, CALCIUM CHLORIDE 600; 310; 30; 20 MG/100ML; MG/100ML; MG/100ML; MG/100ML
9 INJECTION, SOLUTION INTRAVENOUS CONTINUOUS PRN
Status: DISCONTINUED | OUTPATIENT
Start: 2024-04-03 | End: 2024-04-04 | Stop reason: HOSPADM

## 2024-04-03 RX ORDER — DEXAMETHASONE SODIUM PHOSPHATE 4 MG/ML
INJECTION, SOLUTION INTRA-ARTICULAR; INTRALESIONAL; INTRAMUSCULAR; INTRAVENOUS; SOFT TISSUE AS NEEDED
Status: DISCONTINUED | OUTPATIENT
Start: 2024-04-03 | End: 2024-04-03 | Stop reason: SURG

## 2024-04-03 RX ORDER — ONDANSETRON 2 MG/ML
INJECTION INTRAMUSCULAR; INTRAVENOUS AS NEEDED
Status: DISCONTINUED | OUTPATIENT
Start: 2024-04-03 | End: 2024-04-03 | Stop reason: SURG

## 2024-04-03 RX ORDER — ACETAMINOPHEN 500 MG
1000 TABLET ORAL ONCE
Status: COMPLETED | OUTPATIENT
Start: 2024-04-03 | End: 2024-04-03

## 2024-04-03 RX ORDER — BUPIVACAINE HCL/0.9 % NACL/PF 0.1 %
2 PLASTIC BAG, INJECTION (ML) EPIDURAL EVERY 8 HOURS
Status: COMPLETED | OUTPATIENT
Start: 2024-04-03 | End: 2024-04-03

## 2024-04-03 RX ORDER — FENTANYL CITRATE 50 UG/ML
INJECTION, SOLUTION INTRAMUSCULAR; INTRAVENOUS AS NEEDED
Status: DISCONTINUED | OUTPATIENT
Start: 2024-04-03 | End: 2024-04-03 | Stop reason: SURG

## 2024-04-03 RX ORDER — LISINOPRIL 20 MG/1
10 TABLET ORAL
Status: DISCONTINUED | OUTPATIENT
Start: 2024-04-04 | End: 2024-04-04 | Stop reason: HOSPADM

## 2024-04-03 RX ORDER — ONDANSETRON 2 MG/ML
4 INJECTION INTRAMUSCULAR; INTRAVENOUS ONCE AS NEEDED
Status: DISCONTINUED | OUTPATIENT
Start: 2024-04-03 | End: 2024-04-03 | Stop reason: HOSPADM

## 2024-04-03 RX ORDER — ONDANSETRON 4 MG/1
4 TABLET, ORALLY DISINTEGRATING ORAL EVERY 6 HOURS PRN
Status: DISCONTINUED | OUTPATIENT
Start: 2024-04-03 | End: 2024-04-04 | Stop reason: HOSPADM

## 2024-04-03 RX ORDER — SODIUM CHLORIDE 9 MG/ML
40 INJECTION, SOLUTION INTRAVENOUS AS NEEDED
Status: DISCONTINUED | OUTPATIENT
Start: 2024-04-03 | End: 2024-04-03 | Stop reason: HOSPADM

## 2024-04-03 RX ORDER — CELECOXIB 100 MG/1
200 CAPSULE ORAL ONCE
Status: COMPLETED | OUTPATIENT
Start: 2024-04-03 | End: 2024-04-03

## 2024-04-03 RX ORDER — ASPIRIN 81 MG/1
81 TABLET ORAL DAILY
COMMUNITY
End: 2024-04-04 | Stop reason: HOSPADM

## 2024-04-03 RX ORDER — DICYCLOMINE HYDROCHLORIDE 10 MG/1
10 CAPSULE ORAL 4 TIMES DAILY PRN
COMMUNITY

## 2024-04-03 RX ORDER — MAGNESIUM HYDROXIDE 1200 MG/15ML
LIQUID ORAL AS NEEDED
Status: DISCONTINUED | OUTPATIENT
Start: 2024-04-03 | End: 2024-04-03 | Stop reason: HOSPADM

## 2024-04-03 RX ORDER — OXYCODONE HYDROCHLORIDE 5 MG/1
5 TABLET ORAL
Status: DISCONTINUED | OUTPATIENT
Start: 2024-04-03 | End: 2024-04-03 | Stop reason: HOSPADM

## 2024-04-03 RX ORDER — LIDOCAINE HYDROCHLORIDE 20 MG/ML
INJECTION, SOLUTION EPIDURAL; INFILTRATION; INTRACAUDAL; PERINEURAL AS NEEDED
Status: DISCONTINUED | OUTPATIENT
Start: 2024-04-03 | End: 2024-04-03 | Stop reason: SURG

## 2024-04-03 RX ORDER — KETOROLAC TROMETHAMINE 15 MG/ML
15 INJECTION, SOLUTION INTRAMUSCULAR; INTRAVENOUS EVERY 6 HOURS
Qty: 4 ML | Refills: 0 | Status: COMPLETED | OUTPATIENT
Start: 2024-04-03 | End: 2024-04-04

## 2024-04-03 RX ORDER — CLINDAMYCIN PHOSPHATE 900 MG/50ML
900 INJECTION, SOLUTION INTRAVENOUS ONCE
Status: COMPLETED | OUTPATIENT
Start: 2024-04-03 | End: 2024-04-03

## 2024-04-03 RX ORDER — DILTIAZEM HYDROCHLORIDE 180 MG/1
180 CAPSULE, COATED, EXTENDED RELEASE ORAL
Status: DISCONTINUED | OUTPATIENT
Start: 2024-04-03 | End: 2024-04-04 | Stop reason: HOSPADM

## 2024-04-03 RX ORDER — CARBAMAZEPINE 200 MG/1
200 TABLET ORAL NIGHTLY
Status: DISCONTINUED | OUTPATIENT
Start: 2024-04-03 | End: 2024-04-04 | Stop reason: HOSPADM

## 2024-04-03 RX ORDER — OXYCODONE AND ACETAMINOPHEN 7.5; 325 MG/1; MG/1
1 TABLET ORAL EVERY 4 HOURS PRN
Status: DISCONTINUED | OUTPATIENT
Start: 2024-04-03 | End: 2024-04-04 | Stop reason: HOSPADM

## 2024-04-03 RX ORDER — PROMETHAZINE HYDROCHLORIDE 12.5 MG/1
25 TABLET ORAL ONCE AS NEEDED
Status: DISCONTINUED | OUTPATIENT
Start: 2024-04-03 | End: 2024-04-03 | Stop reason: HOSPADM

## 2024-04-03 RX ADMIN — HYDROMORPHONE HYDROCHLORIDE 0.5 MG: 1 INJECTION, SOLUTION INTRAMUSCULAR; INTRAVENOUS; SUBCUTANEOUS at 11:39

## 2024-04-03 RX ADMIN — SODIUM CHLORIDE, POTASSIUM CHLORIDE, SODIUM LACTATE AND CALCIUM CHLORIDE 9 ML/HR: 600; 310; 30; 20 INJECTION, SOLUTION INTRAVENOUS at 08:57

## 2024-04-03 RX ADMIN — ROPIVACAINE HYDROCHLORIDE 30 ML: 5 INJECTION, SOLUTION EPIDURAL; INFILTRATION; PERINEURAL at 09:17

## 2024-04-03 RX ADMIN — SODIUM CHLORIDE 100 ML/HR: 9 INJECTION, SOLUTION INTRAVENOUS at 15:42

## 2024-04-03 RX ADMIN — DEXMEDETOMIDINE 30 MCG: 100 INJECTION, SOLUTION INTRAVENOUS at 09:19

## 2024-04-03 RX ADMIN — PROPOFOL 150 MG: 10 INJECTION, EMULSION INTRAVENOUS at 09:51

## 2024-04-03 RX ADMIN — SODIUM CHLORIDE 2 G: 900 INJECTION INTRAVENOUS at 21:42

## 2024-04-03 RX ADMIN — SODIUM CHLORIDE 2 G: 900 INJECTION INTRAVENOUS at 15:42

## 2024-04-03 RX ADMIN — ROCURONIUM BROMIDE 20 MG: 10 INJECTION, SOLUTION INTRAVENOUS at 10:37

## 2024-04-03 RX ADMIN — DILTIAZEM HYDROCHLORIDE 180 MG: 180 CAPSULE, COATED, EXTENDED RELEASE ORAL at 16:29

## 2024-04-03 RX ADMIN — SODIUM CHLORIDE, POTASSIUM CHLORIDE, SODIUM LACTATE AND CALCIUM CHLORIDE: 600; 310; 30; 20 INJECTION, SOLUTION INTRAVENOUS at 11:32

## 2024-04-03 RX ADMIN — PANTOPRAZOLE SODIUM 40 MG: 40 TABLET, DELAYED RELEASE ORAL at 16:30

## 2024-04-03 RX ADMIN — MIDAZOLAM HYDROCHLORIDE 2 MG: 1 INJECTION, SOLUTION INTRAMUSCULAR; INTRAVENOUS at 09:00

## 2024-04-03 RX ADMIN — ACETAMINOPHEN 1000 MG: 500 TABLET ORAL at 08:57

## 2024-04-03 RX ADMIN — HYDROMORPHONE HYDROCHLORIDE 0.5 MG: 1 INJECTION, SOLUTION INTRAMUSCULAR; INTRAVENOUS; SUBCUTANEOUS at 12:15

## 2024-04-03 RX ADMIN — ONDANSETRON HYDROCHLORIDE 4 MG: 2 SOLUTION INTRAMUSCULAR; INTRAVENOUS at 10:05

## 2024-04-03 RX ADMIN — EPHEDRINE SULFATE 10 MG: 50 INJECTION INTRAVENOUS at 10:21

## 2024-04-03 RX ADMIN — CLINDAMYCIN PHOSPHATE 900 MG: 900 INJECTION, SOLUTION INTRAVENOUS at 09:47

## 2024-04-03 RX ADMIN — SUGAMMADEX 200 MG: 100 INJECTION, SOLUTION INTRAVENOUS at 11:33

## 2024-04-03 RX ADMIN — LIDOCAINE HYDROCHLORIDE 80 MG: 20 INJECTION, SOLUTION INTRAVENOUS at 09:51

## 2024-04-03 RX ADMIN — TRANEXAMIC ACID 1000 MG: 10 INJECTION, SOLUTION INTRAVENOUS at 09:00

## 2024-04-03 RX ADMIN — KETOROLAC TROMETHAMINE 15 MG: 15 INJECTION, SOLUTION INTRAMUSCULAR; INTRAVENOUS at 21:42

## 2024-04-03 RX ADMIN — FENTANYL CITRATE 50 MCG: 50 INJECTION, SOLUTION INTRAMUSCULAR; INTRAVENOUS at 10:37

## 2024-04-03 RX ADMIN — ACETAMINOPHEN 1000 MG: 500 TABLET ORAL at 16:29

## 2024-04-03 RX ADMIN — CELECOXIB 200 MG: 100 CAPSULE ORAL at 08:57

## 2024-04-03 RX ADMIN — FENTANYL CITRATE 100 MCG: 50 INJECTION, SOLUTION INTRAMUSCULAR; INTRAVENOUS at 10:43

## 2024-04-03 RX ADMIN — KETOROLAC TROMETHAMINE 15 MG: 15 INJECTION, SOLUTION INTRAMUSCULAR; INTRAVENOUS at 15:42

## 2024-04-03 RX ADMIN — EPHEDRINE SULFATE 10 MG: 50 INJECTION INTRAVENOUS at 10:00

## 2024-04-03 RX ADMIN — TRANEXAMIC ACID 1000 MG: 10 INJECTION, SOLUTION INTRAVENOUS at 11:19

## 2024-04-03 RX ADMIN — GLYCOPYRROLATE 0.2 MG: 0.2 INJECTION INTRAMUSCULAR; INTRAVENOUS at 09:51

## 2024-04-03 RX ADMIN — FENTANYL CITRATE 50 MCG: 50 INJECTION, SOLUTION INTRAMUSCULAR; INTRAVENOUS at 09:51

## 2024-04-03 RX ADMIN — CARBAMAZEPINE 200 MG: 200 TABLET ORAL at 21:42

## 2024-04-03 RX ADMIN — ROCURONIUM BROMIDE 50 MG: 10 INJECTION, SOLUTION INTRAVENOUS at 09:51

## 2024-04-03 RX ADMIN — DEXAMETHASONE SODIUM PHOSPHATE 4 MG: 4 INJECTION, SOLUTION INTRAMUSCULAR; INTRAVENOUS at 10:05

## 2024-04-03 NOTE — PROGRESS NOTES
[facility]     Progress Note    Patient Name: Christopher Finley  : 1950  MRN: [id number]  Primary Care Physician:  Samy Wing MD  Date of admission: 4/3/2024      Subjective   Brief summary.  Left knee pain      HPI:  Mr. Finley is a 73-year-old male admitted electively for knee replacement.  Patient having chronic pain in the left knee and difficulty walking he already had right knee done several years ago.  Patient is postop awake alert and oriented.  Denies any chest pain, denies any nausea, no shortness of breath    Review of Systems     Fatigue postop.  No chest pain or shortness of breath, no nausea vomiting.  Continues to have some knee pain and numbness in the leg      Objective     Vitals:   Temp:  [97.1 °F (36.2 °C)-97.8 °F (36.6 °C)] 97.5 °F (36.4 °C)  Heart Rate:  [41-63] 48  Resp:  [14-20] 19  BP: (113-168)/(46-85) 113/59  Flow (L/min):  [2-3] 2  FiO2 (%):  [55 %-99 %] 99 %    Physical Exam :     Elderly male not in acute distress, looks of his stated age.  Heart regular.  Lungs clear.  Abdomen soft and obese.  Nontender.  Extremities no edema.  Left knee postsurgical dressing noted.  Peripheral pulses intact.      Result Review:  I have personally reviewed the results from the time of this admission to 4/3/2024 16:05 EDT and agree with these findings:  [x]  Laboratory  []  Microbiology  [x]  Radiology  []  EKG/Telemetry   []  Cardiology/Vascular   []  Pathology  []  Old records  []  Other:           Assessment / Plan       Active Hospital Problems:  Active Hospital Problems    Diagnosis     **Osteoarthritis of left knee     S/P total knee arthroplasty, left     Hypertension     Diabetes mellitus        Plan:   Patient is postop for the knee replacement.  No immediate intra or postop complications.  Known history of diabetes and hypertension.  Patient takes multiple medications at home.  Has not taken anything for the surgery this morning.  Will restart essential meds, monitor blood  pressure, DVT and GI prophylaxis.  Discussed with RN at bedside.       DVT prophylaxis:  Medical and mechanical DVT prophylaxis orders are present.        CODE STATUS:      Full code        Electronically signed by Stanford Cook MD, 04/03/24, 4:03 PM EDT.

## 2024-04-03 NOTE — ANESTHESIA PROCEDURE NOTES
Peripheral Block      Patient reassessed immediately prior to procedure    Patient location during procedure: pre-op  Start time: 4/3/2024 9:16 AM  Stop time: 4/3/2024 9:18 AM  Reason for block: at surgeon's request and post-op pain management  Performed by  Anesthesiologist: Wilman Arndt MD  Preanesthetic Checklist  Completed: patient identified, IV checked, site marked, risks and benefits discussed, surgical consent, monitors and equipment checked, pre-op evaluation and timeout performed  Prep:  Pt Position: supine  Sterile barriers:alcohol skin prep, partial drape, cap, washed/disinfected hands, mask and gloves  Prep: ChloraPrep  Patient monitoring: blood pressure monitoring, continuous pulse oximetry and EKG  Procedure    Sedation: yes  Performed under: local infiltration  Guidance:ultrasound guided and nerve stimulator    ULTRASOUND INTERPRETATION.  Using ultrasound guidance a 20 G gauge needle was placed in close proximity to the nerve, at which point, under ultrasound guidance anesthetic was injected in the area of the nerve and spread of the anesthesia was seen on ultrasound in close proximity thereto.  There were no abnormalities seen on ultrasound; a digital image was taken; and the patient tolerated the procedure with no complications. Images:still images obtained, printed/placed on chart    Laterality:left  Block Type:adductor canal block  Injection Technique:single-shot  Needle Type:echogenic  Needle Gauge:20 G (4in)  Resistance on Injection: none    Medications Used: ropivacaine (NAROPIN) 0.5 % injection - Injection   30 mL - 4/3/2024 9:17:00 AM      Post Assessment  Injection Assessment: negative aspiration for heme, no paresthesia on injection and incremental injection  Patient Tolerance:comfortable throughout block  Complications:no  Additional Notes  The block or continuous infusion is requested by the referring physician for management of postoperative pain, or pain related to a procedure.  Ultrasound guidance (deemed medically necessary). Painless injection, pt was awake and conversant during the procedure without complications. Needle and surrounding structures visualized throughout procedure. No adverse reactions or complications seen during this period. Post-procedure image showed no signs of complication, and anatomy was consistent with an uncomplicated nerve blockade.

## 2024-04-03 NOTE — PLAN OF CARE
Goal Outcome Evaluation:  Plan of Care Reviewed With: patient, spouse           Outcome Evaluation: Patient presents with ROM and strength deficits. Skilled therapy is needed at this time to address above deficits.      Anticipated Discharge Disposition (PT): home with outpatient therapy services

## 2024-04-03 NOTE — ANESTHESIA PREPROCEDURE EVALUATION
Anesthesia Evaluation     Patient summary reviewed and Nursing notes reviewed   history of anesthetic complications:  PONV  NPO Solid Status: > 8 hours  NPO Liquid Status: > 2 hours           Airway   Mallampati: II  TM distance: <3 FB  Neck ROM: full  No difficulty expected  Dental      Pulmonary - normal exam    breath sounds clear to auscultation  (+) a smoker Current,sleep apnea on CPAP  Cardiovascular - normal exam  Exercise tolerance: good (4-7 METS)    ECG reviewed  Rhythm: regular  Rate: normal    (+) hypertension, hyperlipidemia      Neuro/Psych- negative ROS  GI/Hepatic/Renal/Endo    (+) obesity, GERD well controlled, diabetes mellitus    Musculoskeletal     Abdominal    Substance History - negative use     OB/GYN negative ob/gyn ROS         Other   arthritis,     ROS/Med Hx Other: >4METS.HX SLEEP APNEA,HTN,DM,GERD,PTSD,CLAUSTROPHOBIA. STRESS 2/24/22 EF 65% LOW RISK STUDY. TJR. KT                Anesthesia Plan    ASA 2     general and regional     (Patient understands anesthesia not responsible for dental damage. Regional anesthesia options discussed with patient. Pt accepts regional block.)  intravenous induction     Anesthetic plan, risks, benefits, and alternatives have been provided, discussed and informed consent has been obtained with: patient.    Use of blood products discussed with patient .    Plan discussed with CRNA.    CODE STATUS:

## 2024-04-03 NOTE — PLAN OF CARE
Goal Outcome Evaluation:  Pt is alert and oriented X 4, on 2L NC, and X 1 assist with walker. All scheduled medications given as ordered. Pt had 1 complaint of pain rating 2/10. Toradol given, ice packs on incision, and LLE slightly elevated with purple heel cushion. Safety checks maintained throughout shift with pt resting in chair, wheels to chair locked, chair alarm on, and personal items and call light within reach. VSS, pt has paresh HR between 40-50's, MD aware.

## 2024-04-03 NOTE — OP NOTE
TOTAL KNEE ARTHROPLASTY  Procedure Report    Patient Name:  Christopher Finley  YOB: 1950    Date of Surgery:  4/3/2024     Indications:  The patient has had persistent knee pain and x-rays reveal advanced osteoarthritis.  They wish to proceed with operative treatment.  Risks and benefits of surgery were discussed.  Risk of bleeding, infection, damage to neurovascular structures, heart attack, stroke, DVT/PE, anesthesia complications including death, stiffness, instability, periprosthetic fracture, deep infection, among others were discussed.  Informed consent was obtained and they wish to proceed.      Pre-op Diagnosis:   Primary osteoarthritis of left knee [M17.12]       Post-Op Diagnosis Codes:     * Primary osteoarthritis of left knee [M17.12]    Procedure/CPT® Codes:      Procedure(s):  LEFT TOTAL KNEE ARTHROPLASTY    Staff:  Surgeon(s):  Black Jimenez MD    Assistant: Ny Sher; Oleg Gordon RN    Surgical Approach: Knee Medial Parapatellar        Anesthesia: General with Block    Estimated Blood Loss: 75 mL    Implants:    Implant Name Type Inv. Item Serial No.  Lot No. LRB No. Used Action   CMT BONE PALACOS R HI/VISC 1X40 - [id number] Implant CMT BONE PALACOS R HI/VISC 1X40  [facility] [id number] Left 2 Implanted   STEM TIB/KN PERSONA CMT 5D MARION LT - [id number] Implant STEM TIB/KN PERSONA CMT 5D MARION LT  [employer] [id number] Left 1 Implanted   COMP FEM/KN PERSONA CR CMT COCR STD SZ8 LT - [id number] Implant COMP FEM/KN PERSONA CR CMT COCR STD SZ8 LT  [employer] [id number] Left 1 Implanted   PAT KN PERSONA ALLPOLY CMT 35MM - [id number] Implant PAT KN PERSONA ALLPOLY CMT 35MM  [employer] [id number] Left 1 Implanted   ART/SRF KN PERSONA/VE PS EF 8TO11 10MM LT - [id number] Implant ART/SRF KN PERSONA/VE PS EF 8TO11 10MM LT  [employer] [id number] Left 1 Implanted       Specimen:          None        Findings: knee osteoarthritis    Complications:  None    Description of Procedure: The patient was brought to the operating room and placed supine on the OR table.  General anesthesia was given.  Preoperatively, the patient received an adductor canal nerve block. The patient was placed supine on the OR table.  All bony prominences were padded. The tourniquet was placed on the thigh. The affected lower extremity was prepped and draped in the usual sterile fashion. Preoperative antibiotic was given. Tranexamic acid intravenously was given at the beginning and the end of the surgery.  At this point, an Esmarch was used to exsanguinate the limb and the tourniquet was inflated. A longitudinal incision was made over the knee and a medial parapatellar arthrotomy was performed.  Appropriate releases were performed to the proximal medial tibia. The patellar fat pad was incised.  The patella was subluxed laterally and the knee was examined. There was severe tricompartmental wear and osteophyte formation.  The medial and lateral menisci were removed.  Osteophytes in the femur were debrided and intramedullary drill hole was made and a guide was placed in the femur.  A distal femoral cut was made at 5 degrees valgus angulation. At this point, we then sized the femur with posterior referencing with 3 degrees external rotation, pinning the cutting block into place.  The anterior, posterior, and chamfer cuts were made.  The bone was removed.  The tibia was then subluxed anteriorly and the extramedullary tibial guide was placed.  A 7 degree posterior slope was used.  This was pinned into place in alignment with the tibial crest and the second ray.  The proximal tibial cut was then made.  The spacer block was able to fit well in flexion and extension. Therefore, the tibial pins were removed. We then placed the knee in flexion where laminar spreaders were used to open the flexion gaps.  The menisci were removed.  Osteophytes were removed from the posterior femur. The posterior  capsule was injected with anesthetic cocktail.  At this point, the appropriately sized tibial tray was chosen.  This was pinned into place in line with the medial one third of the tibial tubercle. We then placed the trial femur. The trial insert was placed and the knee was brought to full extension. It was stable to varus and valgus stress.  We then everted the patella. It was resected and drilled, and a trial patella was placed. The patella tracked well with no evidence of instability.  The knee was then trialed with range of motion again. The femoral drill holes were made. The tibia was finished with the drill and the punch.  The components were removed. The knee was irrigated and dried. The cement was mixed and the tibia and femur were cemented into place.  The medial congruent spacer was then inserted.  The patella was cemented into place. The knee was irrigated with Irrisept and normal saline Pulsavac lavage.  The medial congruent polyethylene was inserted. The knee was stable to varus and valgus stress. There was good balance to the ligaments medially and laterally. There was good flexion with a stable patella. At this point, the tourniquet was released.  There was minimal bleeding controlled with electrocautery. The arthrotomy was closed with #1 Vicryl at 30 degrees of flexion, the subcutaneous tissues with 2-0 Vicryl, and the skin with staples.  An Aquacel dressing was placed and an Ace wrap was placed. Patient awoke from anesthesia in stable condition. There were no complications. All counts were correct.           Assistant: Ny Sher Christopher, RN  was responsible for performing the following activities: Retraction, Suction, Irrigation, and Placing Dressing and their skilled assistance was necessary for the success of this case.    Black Jimenez MD     Date: 4/3/2024  Time: 11:46 EDT

## 2024-04-03 NOTE — ANESTHESIA POSTPROCEDURE EVALUATION
Patient: Christopher Finley    Procedure Summary       Date: 04/03/24 Room / Location: [facility] OR 03 / [facility] MAIN OR    Anesthesia Start: 0947 Anesthesia Stop: 1147    Procedure: LEFT TOTAL KNEE ARTHROPLASTY (Left: Knee) Diagnosis:       Primary osteoarthritis of left knee      (Primary osteoarthritis of left knee [M17.12])    Surgeons: Black Jimenez MD Provider: Wilman Arndt MD    Anesthesia Type: general, regional ASA Status: 2            Anesthesia Type: general, regional    Vitals  Vitals Value Taken Time   /56 04/03/24 1250   Temp 36.4 °C (97.6 °F) 04/03/24 1250   Pulse 54 04/03/24 1250   Resp 16 04/03/24 1250   SpO2 94 % 04/03/24 1250           Post Anesthesia Care and Evaluation    Patient location during evaluation: bedside  Patient participation: complete - patient participated  Level of consciousness: awake  Pain management: adequate    Airway patency: patent  PONV Status: none  Cardiovascular status: acceptable and stable  Respiratory status: acceptable  Hydration status: acceptable    Comments: An Anesthesiologist personally participated in the most demanding procedures (including induction and emergence if applicable) in the anesthesia plan, monitored the course of anesthesia administration at frequent intervals and remained physically present and available for immediate diagnosis and treatment of emergencies.

## 2024-04-03 NOTE — THERAPY EVALUATION
Acute Care - Physical Therapy Initial Evaluation and Treatment Note  GERONIMO Menendez     Patient Name: Christopher Finley  : 1950  MRN: [id number]  Today's Date: 4/3/2024    Admit date: 4/3/2024     Referring Physician: Stanford Cook MD     Surgery Date:4/3/2024   Procedure(s) (LRB):  LEFT TOTAL KNEE ARTHROPLASTY (Left)         Visit Dx:     ICD-10-CM ICD-9-CM   1. Difficulty in walking  R26.2 719.7     Patient Active Problem List   Diagnosis    Osteoarthritis of left elbow    Lateral epicondylitis of left elbow    Right knee pain    Osteoarthritis of right knee    Calculus of gallbladder with acute on chronic cholecystitis without obstruction    Generalized abdominal pain    Weight loss    Dark stools    Malaise and fatigue    Osteoarthritis of right knee, unspecified osteoarthritis type    Aftercare following surgery of right total knee arthroplasty 2022    Osteoarthritis of left knee    Bankart lesion of right shoulder    Left lateral epicondylitis    Left knee pain    Anterior shoulder dislocation, right, sequela    S/P total knee arthroplasty, left     Past Medical History:   Diagnosis Date    Acid reflux     Anxiety     Arthritis     generalized,  right knee    Cancer     RIGHT CHEEK SKIN CANCER REMOVED    Chronic post-traumatic stress disorder (PTSD)     Claustrophobia     Condition not found     HX SEVERE LACERATION LEFT THUMB FROM TABLE SAW NERVE DAMAGE    COVID     10/2023    Depression     Depression     Diabetes mellitus     TYPE 2, BS IN THE A.M.     Explosive personality disorder     Gastroenteritis     [id number]    GERD (gastroesophageal reflux disease)     H/O degenerative disc disease     Hemorrhoids     Hyperlipemia     Hypertension     Left elbow pain 2018    Left shoulder pain 2018    Left wrist pain 2018    Mood disorder     PONV (postoperative nausea and vomiting)     Prostatitis      HOSPITALIZED HAD UTI    PTSD (post-traumatic stress disorder)     Sleep apnea      cpap     Suicide ideation     HX OF SEVERAL YEARS AGO 2001 FOLLOWED BY COUNSELOR AND TAKES MEDICATION FOR PTSD, DEPRESSION, AND ANXIETY AND DENIES ANY CURRENT ISSUES    Tremor     OF RIGHT HAND/LOWER ARM ONLY. REPORTS HAS NOT RECEIVED OFFICIAL DIAGNOSIS FOR THIS    UTI (urinary tract infection)     HOSPITALIZED 2020. DENIES ANY CURRENT ISSUES     Past Surgical History:   Procedure Laterality Date    CHOLECYSTECTOMY N/A 01/14/2022    Procedure: CHOLECYSTECTOMY LAPAROSCOPIC;  Surgeon: Abdulkadir Sol MD;  Location: [facility];  Service: General;  Laterality: N/A;    COLONOSCOPY      COLONOSCOPY N/A 02/10/2022    Procedure: COLONOSCOPY with random biopsies, polypectomy with cold snare;  Surgeon: Lamont Hargrove MD;  Location: [facility] ENDOSCOPY;  Service: Gastroenterology;  Laterality: N/A;  diverticulosis, colon polyps     CYSTOSCOPY      ENDOSCOPY N/A 02/10/2022    Procedure: ESOPHAGOGASTRODUODENOSCOPY with biopsy;  Surgeon: Lamont Hargrove MD;  Location: [facility] ENDOSCOPY;  Service: Gastroenterology;  Laterality: N/A;  hiatal hernia    HAND SURGERY      left thumb repair    HEMORRHOIDECTOMY      BANDED, 2004,2006, 2007, 2020    INGUINAL HERNIA REPAIR  1998    RIGHT    LACERATION REPAIR      HEAD, AGE 18    MOUTH SURGERY Right     CYST REMOVED LOWER RIGHT JAW    PROSTATE BIOPSY      SIGMOIDOSCOPY      SKIN CANCER EXCISION      right cheek    TENDON REPAIR      LEFT HAND    TOTAL KNEE ARTHROPLASTY Right 6/1/2022    Procedure: TOTAL KNEE ARTHROPLASTY, right;  Surgeon: Black Jimenez MD;  Location: [facility] MAIN OR;  Service: Orthopedics;  Laterality: Right;    TOTAL KNEE ARTHROPLASTY Left 4/3/2024    Procedure: LEFT TOTAL KNEE ARTHROPLASTY;  Surgeon: Black Jimenez MD;  Location: [facility] MAIN OR;  Service: Orthopedics;  Laterality: Left;    UPPER GASTROINTESTINAL ENDOSCOPY       PT Assessment (Last 12 Hours)       PT Evaluation and Treatment       Row Name 04/03/24 1538 04/03/24 1528        Physical Therapy Time and Intention    Subjective Information complains of;pain  -VELMA complains of;pain  -VELMA    Document Type therapy note (daily note)  -VELMA evaluation  -VELMA    Mode of Treatment individual therapy;physical therapy  -VELMA individual therapy;physical therapy  -VELMA    Patient Effort -- excellent  -VELMA    Symptoms Noted During/After Treatment -- none  -VELMA      Row Name 04/03/24 1528          General Information    Patient Profile Reviewed yes  -EVLMA     Patient Observations alert;cooperative;agree to therapy  -VELMA     Prior Level of Function independent:  -VELMA     Equipment Currently Used at Home none  -VELMA     Existing Precautions/Restrictions fall;weight bearing  -VELMA     Risks Reviewed patient and family:  -VELMA     Benefits Reviewed patient and family:  -VELMA     Barriers to Rehab none identified  -VELMA       Row Name 04/03/24 1528          Living Environment    Current Living Arrangements home  -VELMA     Home Accessibility stairs to enter home  -VELMA     People in Home spouse  -VELMA     Primary Care Provided by self  -VELMA       Row Name 04/03/24 1528          Home Main Entrance    Number of Stairs, Main Entrance three  -VELMA     Stair Railings, Main Entrance none  -VELMA       Row Name 04/03/24 1528          Home Use of Assistive/Adaptive Equipment    Equipment Currently Used at Home none  -VELMA       Row Name 04/03/24 1528          Pain    Pretreatment Pain Rating 3/10  -VELMA     Posttreatment Pain Rating 4/10  -VELMA     Pain Location - Side/Orientation Left  -VELMA     Pain Location - knee  -VELMA       Row Name 04/03/24 1528          Cognition    Orientation Status (Cognition) oriented x 4  -VELMA       Row Name 04/03/24 1528          Range of Motion (ROM)    Range of Motion bilateral lower extremities  L knee AROM 0-80, R knee WNL  -VELMA       Row Name 04/03/24 1528          Strength (Manual Muscle Testing)    Strength (Manual Muscle Testing) bilateral lower extremities  MMT R 4+/5, L 4-/5  -VELMA       Row Name 04/03/24 1528          Mobility     Extremity Weight-bearing Status left lower extremity  -VELMA     Left Lower Extremity (Weight-bearing Status) weight-bearing as tolerated (WBAT)  -VELMA       Row Name 04/03/24 1528          Bed Mobility    Bed Mobility bed mobility (all) activities  -VELMA     All Activities, Spink (Bed Mobility) standby assist  -VELMA       Row Name 04/03/24 1528          Transfers    Transfers sit-stand transfer;stand-sit transfer  -VELMA     Maintains Weight-bearing Status (Transfers) able to maintain  -VELMA       Row Name 04/03/24 1528          Sit-Stand Transfer    Sit-Stand Spink (Transfers) contact guard  -VELMA     Assistive Device (Sit-Stand Transfers) walker, front-wheeled  -VELMA       Row Name 04/03/24 1528          Stand-Sit Transfer    Stand-Sit Spink (Transfers) contact guard  -VELMA     Assistive Device (Stand-Sit Transfers) walker, front-wheeled  -VELMA       Row Name 04/03/24 1528          Gait/Stairs (Locomotion)    Gait/Stairs Locomotion gait/ambulation assistive device  -VELMA     Spink Level (Gait) contact guard  -VELMA     Assistive Device (Gait) walker, front-wheeled  -VEMLA     Patient was able to Ambulate yes  -VELMA     Distance in Feet (Gait) 100  -VELMA     Pattern (Gait) step-to  -VELMA       Row Name 04/03/24 1528          Safety Issues, Functional Mobility    Impairments Affecting Function (Mobility) balance;range of motion (ROM);strength  -VELMA       Row Name 04/03/24 1528          Balance    Balance Assessment standing dynamic balance  -VELMA     Dynamic Standing Balance contact guard  -VELMA     Position/Device Used, Standing Balance walker, front-wheeled  -VELMA       Row Name 04/03/24 1538          Motor Skills    Therapeutic Exercise knee  AROM hip/knee/ankle x10, quad sets x20, SLR x10, heel slides x10  -VELMA       Row Name             [REMOVED] Wound 06/01/22 0907 Right anterior knee Incision    Wound - Properties Group Placement Date: 06/01/22  -HG Placement Time: 0907  -HG Present on Original Admission: N  -HG Side:  Right  -HG Orientation: anterior  -HG Location: knee  -HG Primary Wound Type: Incision  -HG Additional Comments: Surgical Site  -HG Removal Date: 04/03/24  -AS Removal Time: 0848  -AS Wound Outcome: Healed  -AS    Retired Wound - Properties Group Placement Date: 06/01/22  -HG Placement Time: 0907  -HG Present on Original Admission: N  -HG Side: Right  -HG Orientation: anterior  -HG Location: knee  -HG Primary Wound Type: Incision  -HG Additional Comments: Surgical Site  -HG Removal Date: 04/03/24  -AS Removal Time: 0848  -AS Wound Outcome: Healed  -AS    Retired Wound - Properties Group Date first assessed: 06/01/22  -HG Time first assessed: 0907  -HG Present on Original Admission: N  -HG Side: Right  -HG Location: knee  -HG Primary Wound Type: Incision  -HG Additional Comments: Surgical Site  -HG Resolution Date: 04/03/24  -AS Resolution Time: 0848  -AS Wound Outcome: Healed  -AS      Row Name             Wound 04/03/24 1038 Left anterior knee Incision    Wound - Properties Group Placement Date: 04/03/24  -SC Placement Time: 1038  -SC Present on Original Admission: N  -SC Side: Left  -SC Orientation: anterior  -SC Location: knee  -SC Primary Wound Type: Incision  -SC    Retired Wound - Properties Group Placement Date: 04/03/24  -SC Placement Time: 1038  -SC Present on Original Admission: N  -SC Side: Left  -SC Orientation: anterior  -SC Location: knee  -SC Primary Wound Type: Incision  -SC    Retired Wound - Properties Group Date first assessed: 04/03/24  -SC Time first assessed: 1038  -SC Present on Original Admission: N  -SC Side: Left  -SC Location: knee  -SC Primary Wound Type: Incision  -SC      Row Name 04/03/24 1528          Plan of Care Review    Plan of Care Reviewed With patient;spouse  -VELMA     Outcome Evaluation Patient presents with ROM and strength deficits. Skilled therapy is needed at this time to address above deficits.  -VELMA       Row Name 04/03/24 1528          Positioning and Restraints     Pre-Treatment Position sitting in chair/recliner  -VELMA     Post Treatment Position chair  -VELMA     In Chair reclined;call light within reach;encouraged to call for assist;exit alarm on  -VELMA       Row Name 04/03/24 1528          Therapy Assessment/Plan (PT)    Patient/Family Therapy Goals Statement (PT) Less pain  -VELMA     Rehab Potential (PT) good, to achieve stated therapy goals  -VELMA     Criteria for Skilled Interventions Met (PT) yes  -VELMA     Therapy Frequency (PT) daily  -VELMA     Predicted Duration of Therapy Intervention (PT) 10 days  -VELMA     Problem List (PT) problems related to;balance;range of motion (ROM);strength  -VELMA     Activity Limitations Related to Problem List (PT) unable to ambulate safely;unable to transfer safely  -VELMA       Row Name 04/03/24 1528          PT Evaluation Complexity    History, PT Evaluation Complexity 3 or more personal factors and/or comorbidities  -VELMA     Examination of Body Systems (PT Eval Complexity) total of 4 or more elements  -VELMA     Clinical Presentation (PT Evaluation Complexity) stable  -VELMA     Clinical Decision Making (PT Evaluation Complexity) low complexity  -VELMA     Overall Complexity (PT Evaluation Complexity) low complexity  -VELMA       Row Name 04/03/24 1528          Therapy Plan Review/Discharge Plan (PT)    Therapy Plan Review (PT) evaluation/treatment results reviewed  -VELMA       Row Name 04/03/24 1528          Physical Therapy Goals    Gait Training Goal Selection (PT) gait training, PT goal 1  -VELMA     Strength Goal Selection (PT) strength, PT goal 1  -VELMA     Stairs Goal Selection (PT) stairs, PT goal 1  -VELMA       Row Name 04/03/24 1528          Gait Training Goal 1 (PT)    Activity/Assistive Device (Gait Training Goal 1, PT) gait (walking locomotion);walker, rolling  -VELMA     [place] Level (Gait Training Goal 1, PT) standby assist  -VELMA     Distance (Gait Training Goal 1, PT) 200  -VELMA     Time Frame (Gait Training Goal 1, PT) 10 days  -VELMA     Strategies/Barriers  (Gait Training Goal 1, PT) No barriers identified.  -VELMA       Row Name 04/03/24 1528          Strength Goal 1 (PT)    Strength Goal 1 (PT) Increase L LE strength to MMT 4/5  -VELMA     Time Frame (Strength Goal 1, PT) 10 days  -VELMA     Strategies/Barriers (Strength Goal 1, PT) No barriers identified.  -VELMA       Row Name 04/03/24 1528          Stairs Goal 1 (PT)    Activity/Assistive Device (Stairs Goal 1, PT) ascending stairs;descending stairs  -VELMA     [place] Level/Cues Needed (Stairs Goal 1, PT) standby assist  -VEMLA     Number of Stairs (Stairs Goal 1, PT) 3  -VELMA     Time Frame (Stairs Goal 1, PT) 10 days  -VELMA     Strategies/Barriers (Stairs Goal 1, PT) No barriers identified.  -VELMA               User Key  (r) = Recorded By, (t) = Taken By, (c) = Cosigned By      Initials Name Provider Type    Sona Lynne RN Registered Nurse    AS Del Davey RN Registered Nurse    Rich Cohen RN Registered Nurse    Shai Webber PT Physical Therapist                    Physical Therapy Education       Title: PT OT SLP Therapies (Done)       Topic: Physical Therapy (Done)       Point: Mobility training (Done)       Learning Progress Summary             Patient Acceptance, E,TB, VU by VELMA at 4/3/2024 1540                         Point: Home exercise program (Done)       Learning Progress Summary             Patient Acceptance, E,TB, VU by VELMA at 4/3/2024 1540                         Point: Precautions (Done)       Learning Progress Summary             Patient Acceptance, E,TB, VU by VELMA at 4/3/2024 1540                                         User Key       Initials Effective Dates Name Provider Type Discipline    VELMA 06/03/21 -  Shai Ramirez PT Physical Therapist PT                  PT Recommendation and Plan  Anticipated Discharge Disposition (PT): home with outpatient therapy services  Planned Therapy Interventions (PT): balance training, gait training, ROM (range of motion), strengthening  Therapy  Frequency (PT): daily  Plan of Care Reviewed With: patient, spouse  Outcome Evaluation: Patient presents with ROM and strength deficits. Skilled therapy is needed at this time to address above deficits.   Outcome Measures       Row Name 04/03/24 1500             How much help from another person do you currently need...    Turning from your back to your side while in flat bed without using bedrails? 4  -VELMA      Moving from lying on back to sitting on the side of a flat bed without bedrails? 3  -VELMA      Moving to and from a bed to a chair (including a wheelchair)? 3  -VELMA      Standing up from a chair using your arms (e.g., wheelchair, bedside chair)? 3  -VELMA      Climbing 3-5 steps with a railing? 2  -VELMA      To walk in hospital room? 3  -VELMA      AM-PAC 6 Clicks Score (PT) 18  -VELMA      Highest Level of Mobility Goal 6 --> Walk 10 steps or more  -VELMA         Functional Assessment    Outcome Measure Options AM-PAC 6 Clicks Basic Mobility (PT)  -VELMA                User Key  (r) = Recorded By, (t) = Taken By, (c) = Cosigned By      Initials Name Provider Type    Shai Webber PT Physical Therapist                     Time Calculation:    PT Charges       Row Name 04/03/24 1527             Time Calculation    PT Received On 04/03/24  -VELMA      PT Goal Re-Cert Due Date 04/12/24  -VELMA         Timed Charges    47449 - PT Therapeutic Exercise Minutes 10  -VELMA      95985 - Gait Training Minutes  5  -VELMA         Untimed Charges    PT Eval/Re-eval Minutes 15  -VELMA         Total Minutes    Timed Charges Total Minutes 15  -VELMA      Untimed Charges Total Minutes 15  -VELMA       Total Minutes 30  -VELMA                User Key  (r) = Recorded By, (t) = Taken By, (c) = Cosigned By      Initials Name Provider Type    Shai Webber PT Physical Therapist                  Therapy Charges for Today       Code Description Service Date Service Provider Modifiers Qty    [id number] HC PT THER PROC EA 15 MIN 4/3/2024 Shai Ramirez PT GP 1     [id number] HC PT EVAL LOW COMPLEXITY 2 4/3/2024 Shai Ramirez, PT GP 1            PT G-Codes  Outcome Measure Options: AM-PAC 6 Clicks Basic Mobility (PT)  AM-PAC 6 Clicks Score (PT): 18    Shai Ramirez, PT  4/3/2024

## 2024-04-04 VITALS
DIASTOLIC BLOOD PRESSURE: 68 MMHG | WEIGHT: 227.51 LBS | RESPIRATION RATE: 18 BRPM | HEART RATE: 41 BPM | TEMPERATURE: 97.6 F | HEIGHT: 67 IN | BODY MASS INDEX: 35.71 KG/M2 | OXYGEN SATURATION: 95 % | SYSTOLIC BLOOD PRESSURE: 146 MMHG

## 2024-04-04 LAB
HCT VFR BLD AUTO: 36 % (ref 37.5–51)
HGB BLD-MCNC: 12 G/DL (ref 13–17.7)

## 2024-04-04 PROCEDURE — 85018 HEMOGLOBIN: CPT | Performed by: ORTHOPAEDIC SURGERY

## 2024-04-04 PROCEDURE — 63710000001 LISINOPRIL 20 MG TABLET: Performed by: INTERNAL MEDICINE

## 2024-04-04 PROCEDURE — 63710000001 PANTOPRAZOLE 40 MG TABLET DELAYED-RELEASE: Performed by: INTERNAL MEDICINE

## 2024-04-04 PROCEDURE — 63710000001 APIXABAN 2.5 MG TABLET: Performed by: ORTHOPAEDIC SURGERY

## 2024-04-04 PROCEDURE — 97150 GROUP THERAPEUTIC PROCEDURES: CPT

## 2024-04-04 PROCEDURE — A9270 NON-COVERED ITEM OR SERVICE: HCPCS | Performed by: INTERNAL MEDICINE

## 2024-04-04 PROCEDURE — A9270 NON-COVERED ITEM OR SERVICE: HCPCS | Performed by: ORTHOPAEDIC SURGERY

## 2024-04-04 PROCEDURE — 63710000001 ACETAMINOPHEN EXTRA STRENGTH 500 MG TABLET: Performed by: ORTHOPAEDIC SURGERY

## 2024-04-04 PROCEDURE — 63710000001 OXYCODONE-ACETAMINOPHEN 7.5-325 MG TABLET: Performed by: ORTHOPAEDIC SURGERY

## 2024-04-04 PROCEDURE — 97116 GAIT TRAINING THERAPY: CPT

## 2024-04-04 PROCEDURE — 97165 OT EVAL LOW COMPLEX 30 MIN: CPT

## 2024-04-04 PROCEDURE — 97535 SELF CARE MNGMENT TRAINING: CPT

## 2024-04-04 PROCEDURE — 25810000003 SODIUM CHLORIDE 0.9 % SOLUTION: Performed by: ORTHOPAEDIC SURGERY

## 2024-04-04 PROCEDURE — 25010000002 KETOROLAC TROMETHAMINE PER 15 MG: Performed by: ORTHOPAEDIC SURGERY

## 2024-04-04 PROCEDURE — 94799 UNLISTED PULMONARY SVC/PX: CPT

## 2024-04-04 PROCEDURE — 85014 HEMATOCRIT: CPT | Performed by: ORTHOPAEDIC SURGERY

## 2024-04-04 RX ORDER — NALOXONE HYDROCHLORIDE 4 MG/.1ML
SPRAY NASAL
Qty: 2 EACH | Refills: 0 | Status: SHIPPED | OUTPATIENT
Start: 2024-04-04

## 2024-04-04 RX ORDER — DILTIAZEM HYDROCHLORIDE 180 MG/1
180 CAPSULE, EXTENDED RELEASE ORAL NIGHTLY
Qty: 30 CAPSULE | Refills: 3 | Status: SHIPPED | OUTPATIENT
Start: 2024-04-04 | End: 2024-08-02

## 2024-04-04 RX ORDER — ASPIRIN 325 MG
325 TABLET, DELAYED RELEASE (ENTERIC COATED) ORAL DAILY
Qty: 21 TABLET | Refills: 0 | Status: SHIPPED | OUTPATIENT
Start: 2024-04-12

## 2024-04-04 RX ORDER — HYDROCODONE BITARTRATE AND ACETAMINOPHEN 7.5; 325 MG/1; MG/1
1-2 TABLET ORAL EVERY 4 HOURS PRN
Qty: 40 TABLET | Refills: 0 | Status: SHIPPED | OUTPATIENT
Start: 2024-04-04

## 2024-04-04 RX ADMIN — LISINOPRIL 10 MG: 20 TABLET ORAL at 08:03

## 2024-04-04 RX ADMIN — SODIUM CHLORIDE 100 ML/HR: 9 INJECTION, SOLUTION INTRAVENOUS at 02:03

## 2024-04-04 RX ADMIN — ACETAMINOPHEN 1000 MG: 500 TABLET ORAL at 02:01

## 2024-04-04 RX ADMIN — KETOROLAC TROMETHAMINE 15 MG: 15 INJECTION, SOLUTION INTRAMUSCULAR; INTRAVENOUS at 02:01

## 2024-04-04 RX ADMIN — KETOROLAC TROMETHAMINE 15 MG: 15 INJECTION, SOLUTION INTRAMUSCULAR; INTRAVENOUS at 08:03

## 2024-04-04 RX ADMIN — PANTOPRAZOLE SODIUM 40 MG: 40 TABLET, DELAYED RELEASE ORAL at 08:03

## 2024-04-04 RX ADMIN — ACETAMINOPHEN 1000 MG: 500 TABLET ORAL at 08:03

## 2024-04-04 RX ADMIN — APIXABAN 2.5 MG: 2.5 TABLET, FILM COATED ORAL at 08:03

## 2024-04-04 RX ADMIN — OXYCODONE HYDROCHLORIDE AND ACETAMINOPHEN 1 TABLET: 7.5; 325 TABLET ORAL at 12:01

## 2024-04-04 NOTE — PLAN OF CARE
Goal Outcome Evaluation:   Patient pleasant, alert, and oriented overnight. Vital signs stable; Bradycardia noted. Assist x1 with gait belt and walker; Ambulated 130 feet overnight. Voiding spontaneously without difficulty. Plan to discharge home without home health. Pain well controlled at 0-2/10. Weaned off O2; Tolerating room air. Will continue to monitor. Care ongoing.

## 2024-04-04 NOTE — DISCHARGE SUMMARY
[facility]         DISCHARGE SUMMARY    Patient Name: Christopher Finley  : 1950  MRN: [id number]    Date of Admission: 4/3/2024  Date of Discharge:   Primary Care Physician: Samy Wing MD    Consults       Date and Time Order Name Status Description    4/3/2024 12:55 PM Inpatient Hospitalist Consult              Presenting Problem:   Primary osteoarthritis of left knee [M17.12]  Osteoarthritis of left knee [M17.12]  S/P total knee arthroplasty, left [Z96.652]    Active and Resolved Hospital Problems:  Active Hospital Problems    Diagnosis POA    **Osteoarthritis of left knee [M17.12] Yes    S/P total knee arthroplasty, left [Z96.652] Not Applicable    Hypertension [I10] Yes    Diabetes mellitus [E11.9] Yes      Resolved Hospital Problems   No resolved problems to display.         Hospital Course     Hospital Course:  Christopher Finley is a 73 y.o. male admitted for elective knee replacement on the left.  Patient had no immediate intra or postop complication, patient had surgery yesterday.  He is feeling better.  He had mild bradycardia which is asymptomatic.  Patient is on Cardizem which she received yesterday evening.  He takes it at night.  Patient is cleared for discharge by orthopedic surgeon.  Will discharge him home will adjust his Cardizem to 120 from 180 advised to follow-up with PCP.        DISCHARGE Follow Up Recommendations for labs and diagnostics:   Discharged to home with outpatient follow-up continue PT OT at [facility].      Day of Discharge     Vital Signs:  Temp:  [97.3 °F (36.3 °C)-97.9 °F (36.6 °C)] 97.6 °F (36.4 °C)  Heart Rate:  [40-63] 41  Resp:  [14-24] 18  BP: (113-157)/(46-75) 146/68  Flow (L/min):  [1-3] 2  FiO2 (%):  [55 %-99 %] 99 %    Physical Exam:    Elderly male looks of his stated age, not in acute distress.  Heart regular, rate around 42.  Lungs clear.  Abdomen soft.  Extremities trace of edema.  Surgical site looks clean.  Peripheral pulses  intact.      Pertinent  and/or Most Recent Results     LAB RESULTS:      Lab 04/04/24  0255   HEMOGLOBIN 12.0*   HEMATOCRIT 36.0*                             Brief Urine Lab Results       None          Microbiology Results (last 10 days)       ** No results found for the last 240 hours. **            PROCEDURES:    XR Knee 1 or 2 View Left    Result Date: 4/3/2024  Impression: Impression: Expected postoperative changes from total knee arthroplasty with patellar resurfacing   Electronically Signed By-Jair Pinzon On:4/3/2024 12:45 PM                   Labs Pending at Discharge:        Discharge Details        Discharge Medications        New Medications        Instructions Start Date   apixaban 2.5 MG tablet tablet  Commonly known as: ELIQUIS   2.5 mg, Oral, 2 Times Daily      HYDROcodone-acetaminophen 7.5-325 MG per tablet  Commonly known as: Norco   1-2 tablets, Oral, Every 4 Hours PRN      naloxone 4 MG/0.1ML nasal spray  Commonly known as: NARCAN   Call 911. Don't prime. [place] in 1 nostril for overdose. Repeat in 2-3 minutes in other nostril if no or minimal breathing/responsiveness.             Changes to Medications        Instructions Start Date   aspirin 325 MG EC tablet  Commonly known as: Ecotrin  What changed:   medication strength  how much to take  additional instructions  These instructions start on April 12, 2024. If you are unsure what to do until then, ask your doctor or other care provider.   325 mg, Oral, Daily, Begin after completing Eliquis   Start Date: April 12, 2024            Continue These Medications        Instructions Start Date   buPROPion  MG 24 hr tablet  Commonly known as: WELLBUTRIN XL   150 mg, Oral, Every Morning      carBAMazepine 200 MG tablet  Commonly known as: TEGretol   200 mg, Oral, Nightly      carboxymethylcellulose 0.5 % solution  Commonly known as: REFRESH PLUS   1 drop, Both Eyes, As Needed      clindamycin 300 MG capsule  Commonly known as: Cleocin   Take 2  capsules by mouth one hour prior to dental cleaning.      cyproheptadine 4 MG tablet  Commonly known as: PERIACTIN   4 mg, Oral, Nightly      dicyclomine 10 MG capsule  Commonly known as: BENTYL   10 mg, Oral, 4 Times Daily PRN      dilTIAZem 180 MG 24 hr capsule  Commonly known as: Tiazac   180 mg, Oral, Nightly      docusate sodium 50 MG capsule  Commonly known as: COLACE   50 mg, Oral, 3 Times Daily PRN      escitalopram 20 MG tablet  Commonly known as: LEXAPRO   20 mg, Oral, Nightly      Hydrocortisone (Perianal) 2.5 % rectal cream  Commonly known as: ANUSOL-HC   1 application , Rectal, As Needed      lisinopril 10 MG tablet  Commonly known as: PRINIVIL,ZESTRIL   1 tablet, Oral, Daily      metFORMIN 500 MG tablet  Commonly known as: GLUCOPHAGE   250 mg, Oral, 2 Times Daily With Meals, 1/2 tab      pantoprazole 40 MG EC tablet  Commonly known as: PROTONIX   40 mg, Oral, Daily      Psyllium 28.3 % powder   1 each, Oral, Nightly      rosuvastatin 20 MG tablet  Commonly known as: CRESTOR   20 mg, Oral, Daily      vitamin B-12 1000 MCG tablet  Commonly known as: CYANOCOBALAMIN   1,000 mcg, Oral, 2 Times Daily      Vitamin D-3 25 MCG (1000 UT) capsule   1 capsule, Oral, 2 Times Daily               Allergies   Allergen Reactions    Amoxicillin Hives and Rash         Discharge Disposition:    Home or Self Care    Diet:    Heart healthy    Discharge Activity:     Activity Instructions       Activity as Tolerated      Discharge Activity      May discharge home  Follow-up 2 weeks  Call with any problems  Weightbearing as tolerated with walker  Range of motion as tolerated  Outpatient physical therapy  Dressing change postoperative day #3 to new Aquacel only 5 to 7 days.  May shower with Aquacel in place.  No bathing or soaking.  Tibial pin site dressing change daily.  Norco prescription  May start Ecotrin when Eliquis prescription completed            Future Appointments   Date Time Provider Department Center   4/16/2024  10:00 AM Rio Arce PA [facility]   6/4/2024 10:30 AM Rio Arce PA [facility]       Additional Instructions for the Follow-ups that You Need to Schedule       Discharge Follow-up with PCP   As directed       Currently Documented PCP:    Samy Wing MD    PCP Phone Number:    [phone]     Follow Up Details: Next week        Discharge Follow-up with Specified Provider: Dr. Jimenez as recommended   As directed      To: Dr. Jimenez as recommended        Discharge Follow-up with Specified Provider: Claude RAMIRES; 2 Weeks   As directed      To: Claude RAMIRES   Follow Up: 2 Weeks                Time spent on Discharge including face to face service: 30 minutes.            I have dictated this note utilizing Dragon Dictation.             Please note that portions of this note were completed with a voice recognition program.             Part of this note may be an electronic transcription/translation of spoken language to printed text         using the Dragon Dictation System.       Electronically signed by Stanford Cook MD, 04/04/24, 9:31 AM EDT.

## 2024-04-04 NOTE — THERAPY TREATMENT NOTE
Acute Care - Physical Therapy Treatment Note  GERONIMO Menendez     Patient Name: Christopher Finley  : 1950  MRN: [id number]  Today's Date: 2024      Visit Dx:     ICD-10-CM ICD-9-CM   1. Difficulty in walking  R26.2 719.7   2. S/P total knee arthroplasty, left  Z96.652 V43.65     Patient Active Problem List   Diagnosis    Osteoarthritis of left elbow    Lateral epicondylitis of left elbow    Right knee pain    Osteoarthritis of right knee    Calculus of gallbladder with acute on chronic cholecystitis without obstruction    Generalized abdominal pain    Weight loss    Dark stools    Malaise and fatigue    Osteoarthritis of right knee, unspecified osteoarthritis type    Aftercare following surgery of right total knee arthroplasty 2022    Osteoarthritis of left knee    [place] lesion of right shoulder    Left lateral epicondylitis    Left knee pain    Anterior shoulder dislocation, right, sequela    S/P total knee arthroplasty, left    Hypertension    Diabetes mellitus     Past Medical History:   Diagnosis Date    Acid reflux     Anxiety     Arthritis     generalized,  right knee    Cancer     RIGHT CHEEK SKIN CANCER REMOVED    Chronic post-traumatic stress disorder (PTSD)     Claustrophobia     Condition not found     HX SEVERE LACERATION LEFT THUMB FROM TABLE SAW NERVE DAMAGE    COVID     10/2023    Depression     Depression     Diabetes mellitus     TYPE 2, BS IN THE A.M.     Explosive personality disorder     Gastroenteritis     [id number]    GERD (gastroesophageal reflux disease)     H/O degenerative disc disease     Hemorrhoids     Hyperlipemia     Hypertension     Left elbow pain 2018    Left shoulder pain 2018    Left wrist pain 2018    Mood disorder     PONV (postoperative nausea and vomiting)     Prostatitis      HOSPITALIZED HAD UTI    PTSD (post-traumatic stress disorder)     Sleep apnea     cpap     Suicide ideation     HX OF SEVERAL YEARS AGO  FOLLOWED BY COUNSELOR  AND TAKES MEDICATION FOR PTSD, DEPRESSION, AND ANXIETY AND DENIES ANY CURRENT ISSUES    Tremor     OF RIGHT HAND/LOWER ARM ONLY. REPORTS HAS NOT RECEIVED OFFICIAL DIAGNOSIS FOR THIS    UTI (urinary tract infection)     HOSPITALIZED 2020. DENIES ANY CURRENT ISSUES     Past Surgical History:   Procedure Laterality Date    CHOLECYSTECTOMY N/A 01/14/2022    Procedure: CHOLECYSTECTOMY LAPAROSCOPIC;  Surgeon: Abdulkadir Sol MD;  Location: [facility];  Service: General;  Laterality: N/A;    COLONOSCOPY      COLONOSCOPY N/A 02/10/2022    Procedure: COLONOSCOPY with random biopsies, polypectomy with cold snare;  Surgeon: Lamont Hargrove MD;  Location: [facility] ENDOSCOPY;  Service: Gastroenterology;  Laterality: N/A;  diverticulosis, colon polyps     CYSTOSCOPY      ENDOSCOPY N/A 02/10/2022    Procedure: ESOPHAGOGASTRODUODENOSCOPY with biopsy;  Surgeon: Lamont Hargrove MD;  Location: [facility] ENDOSCOPY;  Service: Gastroenterology;  Laterality: N/A;  hiatal hernia    HAND SURGERY      left thumb repair    HEMORRHOIDECTOMY      BANDED, 2004,2006, 2007, 2020    INGUINAL HERNIA REPAIR  1998    RIGHT    LACERATION REPAIR      HEAD, AGE 18    MOUTH SURGERY Right     CYST REMOVED LOWER RIGHT JAW    PROSTATE BIOPSY      SIGMOIDOSCOPY      SKIN CANCER EXCISION      right cheek    TENDON REPAIR      LEFT HAND    TOTAL KNEE ARTHROPLASTY Right 6/1/2022    Procedure: TOTAL KNEE ARTHROPLASTY, right;  Surgeon: Black Jimenez MD;  Location: [facility] MAIN OR;  Service: Orthopedics;  Laterality: Right;    TOTAL KNEE ARTHROPLASTY Left 4/3/2024    Procedure: LEFT TOTAL KNEE ARTHROPLASTY;  Surgeon: Black Jimenez MD;  Location: [facility] MAIN OR;  Service: Orthopedics;  Laterality: Left;    UPPER GASTROINTESTINAL ENDOSCOPY       PT Assessment (Last 12 Hours)       PT Evaluation and Treatment       Row Name 04/04/24 1250          Physical Therapy Time and Intention    Subjective Information complains of;pain (P)   -SM      Document Type therapy note (daily note) (P)   -     Mode of Treatment individual therapy;group therapy;physical therapy (P)   -     Patient Effort good (P)   -     Comment Gait performed individually; therapuetic exercises performed in a group session with 6 participants (P)   -SM       Row Name 04/04/24 1250          General Information    Patient Observations alert;cooperative;agree to therapy (P)   -SM       Row Name 04/04/24 1250          Pain    Posttreatment Pain Rating 0/10 - no pain (P)   -SM       Row Name 04/04/24 1250          Mobility    Extremity Weight-bearing Status left lower extremity (P)   -     Left Lower Extremity (Weight-bearing Status) weight-bearing as tolerated (WBAT) (P)   -SM       Row Name 04/04/24 1250          Transfers    Transfers sit-stand transfer;stand-sit transfer (P)   -SM       Row Name 04/04/24 1250          Gait/Stairs (Locomotion)    Gait/Stairs Locomotion gait/ambulation assistive device (P)   -     Assistive Device (Gait) walker, front-wheeled (P)   -     Distance in Feet (Gait) 125 (P)   -     Negotiation (Stairs) stairs assistive device (P)   -     [place] Level (Stairs) contact guard;verbal cues (P)   -     Assistive Device (Stairs) walker, front-wheeled (P)   -     Handrail Location (Stairs) both sides (P)   -     Ascending Technique (Stairs) step-to-step (P)   -     Descending Technique (Stairs) step-to-step (P)   -SM       Row Name 04/04/24 1250          Safety Issues, Functional Mobility    Impairments Affecting Function (Mobility) balance;pain;range of motion (ROM);strength (P)   -SM       Row Name 04/04/24 1250          Balance    Balance Assessment standing dynamic balance (P)   -     Dynamic Standing Balance contact guard (P)   -     Position/Device Used, Standing Balance walker, front-wheeled (P)   -SM       Row Name 04/04/24 1250          Motor Skills    Therapeutic Exercise hip;knee;ankle (P)   -SM       Row Name 04/04/24 1250           Hip (Therapeutic Exercise)    Hip (Therapeutic Exercise) isometric exercises (P)   -     Hip Isometrics (Therapeutic Exercise) left;gluteal sets;10 repetitions;2 sets (P)   -       Row Name 04/04/24 1250          Knee (Therapeutic Exercise)    Knee (Therapeutic Exercise) strengthening exercise;isometric exercises (P)   -     Knee Isometrics (Therapeutic Exercise) left;quad sets;10 repetitions;2 sets (P)   -     Knee Strengthening (Therapeutic Exercise) left;heel slides;SLR (straight leg raise);SAQ (short arc quad);LAQ (long arc quad);10 repetitions;2 sets (P)   -       Row Name 04/04/24 1250          Ankle (Therapeutic Exercise)    Ankle (Therapeutic Exercise) AROM (active range of motion) (P)   -     Ankle AROM (Therapeutic Exercise) left;dorsiflexion;plantarflexion;10 repetitions;2 sets (P)   -       Row Name             [REMOVED] Wound 04/03/24 1038 Left anterior knee Incision    Wound - Properties Group Placement Date: 04/03/24  -SC Placement Time: 1038  -SC Present on Original Admission: N  -SC Side: Left  -SC Orientation: anterior  -SC Location: knee  -SC Primary Wound Type: Incision  -SC Removal Date: 04/04/24  -AN Removal Time: 1114  -AN    Retired Wound - Properties Group Placement Date: 04/03/24  -SC Placement Time: 1038  -SC Present on Original Admission: N  -SC Side: Left  -SC Orientation: anterior  -SC Location: knee  -SC Primary Wound Type: Incision  -SC Removal Date: 04/04/24  -AN Removal Time: 1114  -AN    Retired Wound - Properties Group Date first assessed: 04/03/24  -SC Time first assessed: 1038  -SC Present on Original Admission: N  -SC Side: Left  -SC Location: knee  -SC Primary Wound Type: Incision  -SC Resolution Date: 04/04/24  -AN Resolution Time: 1114  -AN      Row Name 04/04/24 1250          Progress Summary (PT)    Progress Toward Functional Goals (PT) progress toward functional goals is good (P)   -     Daily Progress Summary (PT) Pt is progressing well with  their exercise program.  Will continue current plan of care. (P)   -SM               User Key  (r) = Recorded By, (t) = Taken By, (c) = Cosigned By      Initials Name Provider Type    Sona Lynne, RN Registered Nurse    Renetta Maki, RN Registered Nurse    Chelsey Sneed, PTA Student PTA Student                    Physical Therapy Education       Title: PT OT SLP Therapies (Resolved)       Topic: Physical Therapy (Resolved)       Point: Mobility training (Resolved)       Learning Progress Summary             Patient Acceptance, E,TB, VU by VELMA at 4/3/2024 1540                         Point: Home exercise program (Resolved)       Learning Progress Summary             Patient Acceptance, E,TB, VU by VELMA at 4/3/2024 1540                         Point: Precautions (Resolved)       Learning Progress Summary             Patient Acceptance, E,TB, VU by VELMA at 4/3/2024 1540                                         User Key       Initials Effective Dates Name Provider Type Discipline    VELMA 06/03/21 -  Shai Ramirez, PT Physical Therapist PT                  PT Recommendation and Plan     Progress Summary (PT)  Progress Toward Functional Goals (PT): (P) progress toward functional goals is good  Daily Progress Summary (PT): (P) Pt is progressing well with their exercise program.  Will continue current plan of care.   Outcome Measures       Row Name 04/04/24 1200 04/03/24 1500          How much help from another person do you currently need...    Turning from your back to your side while in flat bed without using bedrails? 4 (P)   -SM 4  -VELMA     Moving from lying on back to sitting on the side of a flat bed without bedrails? 4 (P)   -SM 3  -VELMA     Moving to and from a bed to a chair (including a wheelchair)? 4 (P)   -SM 3  -VELMA     Standing up from a chair using your arms (e.g., wheelchair, bedside chair)? 4 (P)   -SM 3  -VELMA     Climbing 3-5 steps with a railing? 3 (P)   -SM 2  -VELMA     To walk in hospital  room? 4 (P)   -SM 3  -VELMA     AM-PAC 6 Clicks Score (PT) 23 (P)   -SM 18  -VELMA     Highest Level of Mobility Goal 7 --> Walk 25 feet or more (P)   -SM 6 --> Walk 10 steps or more  -VELMA        Functional Assessment    Outcome Measure Options -- AM-PAC 6 Clicks Basic Mobility (PT)  -VELMA               User Key  (r) = Recorded By, (t) = Taken By, (c) = Cosigned By      Initials Name Provider Type    Shai Webber, PT Physical Therapist    Chelsey Sneed, PTA Student PTA Student                     Time Calculation:    PT Charges       Row Name 04/04/24 1248 04/04/24 1149          Time Calculation    PT Received On 04/04/24 (P)   -SM 04/04/24 (P)   -SM        Timed Charges    90827 - Gait Training Minutes  8 (P)   -SM --     26179 - PT Therapeutic Activity Minutes 5 (P)   -SM --        Untimed Charges    PT Group Therapy Minutes 30 (P)   -SM --        Total Minutes    Timed Charges Total Minutes 13 (P)   -SM --     Untimed Charges Total Minutes 30 (P)   -SM --      Total Minutes 43 (P)   -SM --               User Key  (r) = Recorded By, (t) = Taken By, (c) = Cosigned By      Initials Name Provider Type    Chelsey Sneed PTA Student PTA Student                  Therapy Charges for Today       Code Description Service Date Service Provider Modifiers Qty    81963680896 HC GAIT TRAINING EA 15 MIN 4/4/2024 Chelsey Sánchez PTA Student GP 1    45157530602 HC PT THER PROC GROUP 4/4/2024 Chelsey Sánchez PTA Student GP 1            PT G-Codes  Outcome Measure Options: AM-PAC 6 Clicks Basic Mobility (PT)  AM-PAC 6 Clicks Score (PT): (P) 23    MAHESH Cotton  4/4/2024

## 2024-04-04 NOTE — PLAN OF CARE
Goal Outcome Evaluation:  Plan of Care Reviewed With: patient           Outcome Evaluation: Patient has experienced decline in function from baseline status, presenting w/ deficits related to balance, strength, tolerance, transfers and mobility that impede patient independence with activities of daily living.  Patient would benefit from skilled Occupational Therapy intervention to maxamize patient safety, and promote return to baseline independence.      Anticipated Discharge Disposition (OT): home with outpatient therapy services

## 2024-04-04 NOTE — PLAN OF CARE
Goal Outcome Evaluation:                        Patient D/C today. POC reviewed with patient and family. No new concerns.

## 2024-04-04 NOTE — PROGRESS NOTES
[facility]     Progress Note    Patient Name: Christopher Finley  : 1950  MRN: [id number]  Primary Care Physician:  Samy Wing MD  Date of admission: 4/3/2024    Subjective   Subjective     HPI:  Patient Reports doing well this morning.  His pain is controlled.  He denies any chest pain or shortness of air.  He has been working with therapy and feels like he is doing well.    Review of Systems   See HPI    Objective   Objective     Vitals:   Temp:  [97.1 °F (36.2 °C)-97.9 °F (36.6 °C)] 97.9 °F (36.6 °C)  Heart Rate:  [40-63] 40  Resp:  [14-24] 24  BP: (113-168)/(46-85) 129/66  Flow (L/min):  [1-3] 2  FiO2 (%):  [55 %-99 %] 99 %  Physical Exam    General: Alert, no acute distress   Chest: Unlabored breathing, cardiovascular: Regular heart rate   Musculoskeletal: Neurovascular intact extremity.  Positive pulses.  Dressing intact.  Negative Qing.  Full extension of knee.    Result Review      Hemoglobin   Date Value Ref Range Status   2024 12.0 (L) 13.0 - 17.7 g/dL Final     Hematocrit   Date Value Ref Range Status   2024 36.0 (L) 37.5 - 51.0 % Final        Result Review:  I have personally reviewed the results from the time of this admission to 2024 07:11 EDT and agree with these findings:  [x]  Laboratory  []  Microbiology  [x]  Radiology  []  EKG/Telemetry   []  Cardiology/Vascular   []  Pathology  []  Old records  []  Other:      Assessment & Plan   Assessment / Plan     Brief Patient Summary:  Christopher Finley is a 73 y.o. male who is status post left total knee arthroplasty    Active Hospital Problems:  Active Hospital Problems    Diagnosis     **Osteoarthritis of left knee     S/P total knee arthroplasty, left     Hypertension     Diabetes mellitus      Plan: Weightbearing as tolerated with walker  Physical and Occupational Therapy  Pain control  DVT prophylaxis  Discharge planning: Likely home today with outpatient therapy.       DVT prophylaxis:  Medical and mechanical DVT  prophylaxis orders are present.        CODE STATUS:      Disposition:  I expect patient to be discharged later today if medically able.    Electronically signed by Black Jimenez MD, 04/04/24, 7:11 AM EDT.

## 2024-04-04 NOTE — THERAPY EVALUATION
Patient Name: Christopher Finley  : 1950    MRN: [id number]                              Today's Date: 2024       Admit Date: 4/3/2024    Visit Dx:     ICD-10-CM ICD-9-CM   1. Difficulty in walking  R26.2 719.7   2. S/P total knee arthroplasty, left  Z96.652 V43.65   3. Decreased activities of daily living (ADL)  Z78.9 V49.89     Patient Active Problem List   Diagnosis    Osteoarthritis of left elbow    Lateral epicondylitis of left elbow    Right knee pain    Osteoarthritis of right knee    Calculus of gallbladder with acute on chronic cholecystitis without obstruction    Generalized abdominal pain    Weight loss    Dark stools    Malaise and fatigue    Osteoarthritis of right knee, unspecified osteoarthritis type    Aftercare following surgery of right total knee arthroplasty 2022    Osteoarthritis of left knee    [place] lesion of right shoulder    Left lateral epicondylitis    Left knee pain    Anterior shoulder dislocation, right, sequela    S/P total knee arthroplasty, left    Hypertension    Diabetes mellitus     Past Medical History:   Diagnosis Date    Acid reflux     Anxiety     Arthritis     generalized,  right knee    Cancer     RIGHT CHEEK SKIN CANCER REMOVED    Chronic post-traumatic stress disorder (PTSD)     Claustrophobia     Condition not found     HX SEVERE LACERATION LEFT THUMB FROM TABLE SAW NERVE DAMAGE    COVID     10/2023    Depression     Depression     Diabetes mellitus     TYPE 2, BS IN THE A.M.     Explosive personality disorder     Gastroenteritis     [id number]    GERD (gastroesophageal reflux disease)     H/O degenerative disc disease     Hemorrhoids     Hyperlipemia     Hypertension     Left elbow pain 2018    Left shoulder pain 2018    Left wrist pain 2018    Mood disorder     PONV (postoperative nausea and vomiting)     Prostatitis      HOSPITALIZED HAD UTI    PTSD (post-traumatic stress disorder)     Sleep apnea     cpap     Suicide  ideation     HX OF SEVERAL YEARS AGO 2001 FOLLOWED BY COUNSELOR AND TAKES MEDICATION FOR PTSD, DEPRESSION, AND ANXIETY AND DENIES ANY CURRENT ISSUES    Tremor     OF RIGHT HAND/LOWER ARM ONLY. REPORTS HAS NOT RECEIVED OFFICIAL DIAGNOSIS FOR THIS    UTI (urinary tract infection)     HOSPITALIZED 2020. DENIES ANY CURRENT ISSUES     Past Surgical History:   Procedure Laterality Date    CHOLECYSTECTOMY N/A 01/14/2022    Procedure: CHOLECYSTECTOMY LAPAROSCOPIC;  Surgeon: Abdulkadir Sol MD;  Location: [facility] OR OSC;  Service: General;  Laterality: N/A;    COLONOSCOPY      COLONOSCOPY N/A 02/10/2022    Procedure: COLONOSCOPY with random biopsies, polypectomy with cold snare;  Surgeon: Lamont Hargrove MD;  Location: [facility] ENDOSCOPY;  Service: Gastroenterology;  Laterality: N/A;  diverticulosis, colon polyps     CYSTOSCOPY      ENDOSCOPY N/A 02/10/2022    Procedure: ESOPHAGOGASTRODUODENOSCOPY with biopsy;  Surgeon: Lamont Hargrove MD;  Location: [facility] ENDOSCOPY;  Service: Gastroenterology;  Laterality: N/A;  hiatal hernia    HAND SURGERY      left thumb repair    HEMORRHOIDECTOMY      BANDED, 2004,2006, 2007, 2020    INGUINAL HERNIA REPAIR  1998    RIGHT    LACERATION REPAIR      HEAD, AGE 18    MOUTH SURGERY Right     CYST REMOVED LOWER RIGHT JAW    PROSTATE BIOPSY      SIGMOIDOSCOPY      SKIN CANCER EXCISION      right cheek    TENDON REPAIR      LEFT HAND    TOTAL KNEE ARTHROPLASTY Right 6/1/2022    Procedure: TOTAL KNEE ARTHROPLASTY, right;  Surgeon: Black Jimenez MD;  Location: [facility] MAIN OR;  Service: Orthopedics;  Laterality: Right;    TOTAL KNEE ARTHROPLASTY Left 4/3/2024    Procedure: LEFT TOTAL KNEE ARTHROPLASTY;  Surgeon: Black Jimenez MD;  Location: [facility] MAIN OR;  Service: Orthopedics;  Laterality: Left;    UPPER GASTROINTESTINAL ENDOSCOPY        General Information       Row Name 04/04/24 1540 04/04/24 1531       OT Time and Intention    Document Type therapy note (daily  note)  -ES evaluation  -ES    Mode of Treatment individual therapy;occupational therapy  -ES individual therapy;occupational therapy  -ES      Row Name 04/04/24 1540 04/04/24 1531       General Information    Patient Profile Reviewed -- yes  -ES    Prior Level of Function -- independent:;ADL's;all household mobility;community mobility  Patient independent with ADLs at baseline. No device for functional mobility. Walk in shower, standing shower completion. Elevated commode. [place] in stance. No home O2 use.  -ES    Existing Precautions/Restrictions fall;weight bearing  -ES fall;weight bearing  -ES    Barriers to Rehab -- none identified  -ES      Row Name 04/04/24 1531          Occupational Profile    Reason for Services/Referral (Occupational Profile) Patient is 73 yr old male admitted to [facility] on 4/3/2024 after failed conservative managment of left knee osteoarthritis. Patient is POD 1 left total knee replacement, WBAT LLE. OT evaluation and treatment ordered d/t recent decline in ADLs/transfer ability and discharge planning recommendations. No previous OT services for current condition.  -ES       Row Name 04/04/24 1531          Living Environment    People in Home spouse  -ES       Row Name 04/04/24 1531          Home Main Entrance    Number of Stairs, Main Entrance three  -ES       Row Name 04/04/24 1540 04/04/24 1531       Cognition    Orientation Status (Cognition) --  Patient receptive to all education and training provided, all questions addressed.  -ES oriented x 4  -ES      Row Name 04/04/24 1531          Safety Issues, Functional Mobility    Impairments Affecting Function (Mobility) balance;range of motion (ROM);strength  -ES               User Key  (r) = Recorded By, (t) = Taken By, (c) = Cosigned By      Initials Name Provider Type    Susana Odell, OTR/L, CSRS Occupational Therapist                     Mobility/ADL's       Row Name 04/04/24 1534          Bed Mobility    Bed Mobility  bed mobility (all) activities  -ES     All Activities, [place] (Bed Mobility) not tested  -ES     Comment, (Bed Mobility) not tested. Patient met seated in recliner at therapy arrival to room.  -ES       Row Name 04/04/24 1541 04/04/24 1534       Transfers    Transfers -- sit-stand transfer;stand-sit transfer  -ES    Comment, (Transfers) Patient provided education and training on hand placement, rolling walker management, and body mechanics in preperation for independent transfers and mobility  -ES --      Row Name 04/04/24 1534          Sit-Stand Transfer    Sit-Stand [place] (Transfers) contact guard;1 person assist  -ES     Assistive Device (Sit-Stand Transfers) walker, front-wheeled  -ES       Row Name 04/04/24 1534          Stand-Sit Transfer    Stand-Sit [place] (Transfers) contact guard;1 person assist  -ES     Assistive Device (Stand-Sit Transfers) walker, front-wheeled  -ES       Row Name 04/04/24 1534          Functional Mobility    Functional Mobility- Ind. Level contact guard assist;1 person  -ES     Functional Mobility- Device walker, front-wheeled  -ES       Row Name 04/04/24 1534          Activities of Daily Living    BADL Assessment/Intervention bathing;upper body dressing;lower body dressing;grooming;feeding;toileting  -ES       Row Name 04/04/24 1541 04/04/24 1534       Mobility    Extremity Weight-bearing Status left lower extremity  -ES left lower extremity  -ES    Left Lower Extremity (Weight-bearing Status) weight-bearing as tolerated (WBAT)  -ES weight-bearing as tolerated (WBAT)  -ES      Row Name 04/04/24 1534          Bathing Assessment/Intervention    [place] Level (Bathing) bathing skills;minimum assist (75% patient effort)  -ES       Row Name 04/04/24 1541 04/04/24 1534       Upper Body Dressing Assessment/Training    [place] Level (Upper Body Dressing) upper body dressing skills;don;pull-over garment  -ES upper body dressing skills;set up  -ES    Position (Upper  Body Dressing) unsupported sitting  -ES --      Row Name 04/04/24 1541 04/04/24 1534       Lower Body Dressing Assessment/Training    [place] Level (Lower Body Dressing) lower body dressing skills;don;pants/bottoms;minimum assist (75% patient effort);verbal cues;nonverbal cues (demo/gesture)  -ES lower body dressing skills;minimum assist (75% patient effort)  -ES    Position (Lower Body Dressing) unsupported sitting;supported standing  -ES --    Comment, (Lower Body Dressing) patient provided education and training on adaptive lower body dressing strategies to increase patient independence with LB ADLs. Patient provided return demonstration for ensured understanding.  -ES --      Row Name 04/04/24 1534          Grooming Assessment/Training    [place] Level (Grooming) grooming skills;standby assist  -ES       Row Name 04/04/24 1534          Toileting Assessment/Training    [place] Level (Toileting) toileting skills;contact guard assist  -ES               User Key  (r) = Recorded By, (t) = Taken By, (c) = Cosigned By      Initials Name Provider Type    ES Susana Wiley, OTR/L, CSRS Occupational Therapist                   Obj/Interventions       Row Name 04/04/24 1535          Range of Motion Comprehensive    General Range of Motion bilateral upper extremity ROM WNL  -ES       Row Name 04/04/24 1535          Strength Comprehensive (MMT)    Comment, General Manual Muscle Testing (MMT) Assessment BUEs 4/5  -ES       Row Name 04/04/24 1535          Motor Skills    Motor Skills functional endurance  -ES     Functional Endurance fair  -ES       Row Name 04/04/24 1542 04/04/24 1535       Balance    Balance Assessment -- sitting dynamic balance;standing dynamic balance  -ES    Dynamic Sitting Balance -- standby assist  -ES    Position, Sitting Balance -- unsupported;sitting in chair  -ES    Dynamic Standing Balance -- contact guard;1-person assist  -ES    Position/Device Used, Standing Balance --  supported;walker, front-wheeled  -ES    Balance Interventions standing;dynamic;occupation based/functional task  -ES --    Comment, Balance Patient provided education and training on dynamic ADL engagement in supported stance or seated position to decrease patient fall risk at time of discharge  -ES --              User Key  (r) = Recorded By, (t) = Taken By, (c) = Cosigned By      Initials Name Provider Type    ES Susana Wiley, OTR/L, CSRS Occupational Therapist                   Goals/Plan       Row Name 04/04/24 1539          Bed Mobility Goal 1 (OT)    Activity/Assistive Device (Bed Mobility Goal 1, OT) bed mobility activities, all  -ES     [place] Level/Cues Needed (Bed Mobility Goal 1, OT) modified independence  -ES     Time Frame (Bed Mobility Goal 1, OT) long term goal (LTG);10 days  -ES       Row Name 04/04/24 1539          Transfer Goal 1 (OT)    Activity/Assistive Device (Transfer Goal 1, OT) transfers, all  -ES     [place] Level/Cues Needed (Transfer Goal 1, OT) modified independence  -ES     Time Frame (Transfer Goal 1, OT) long term goal (LTG);10 days  -ES       Row Name 04/04/24 1539          Bathing Goal 1 (OT)    Activity/Device (Bathing Goal 1, OT) bathing skills, all  -ES     [place] Level/Cues Needed (Bathing Goal 1, OT) modified independence  -ES     Time Frame (Bathing Goal 1, OT) long term goal (LTG);10 days  -ES       Row Name 04/04/24 1539          Dressing Goal 1 (OT)    Activity/Device (Dressing Goal 1, OT) dressing skills, all  -ES     [place]/Cues Needed (Dressing Goal 1, OT) modified independence  -ES     Time Frame (Dressing Goal 1, OT) long term goal (LTG);10 days  -ES       Row Name 04/04/24 1539          Toileting Goal 1 (OT)    Activity/Device (Toileting Goal 1, OT) toileting skills, all  -ES     [place] Level/Cues Needed (Toileting Goal 1, OT) modified independence  -ES     Time Frame (Toileting Goal 1, OT) long term goal (LTG);10 days  -ES       Row Name  04/04/24 1539          Grooming Goal 1 (OT)    Activity/Device (Grooming Goal 1, OT) grooming skills, all  -ES     [place] (Grooming Goal 1, OT) modified independence  -ES     Time Frame (Grooming Goal 1, OT) long term goal (LTG);10 days  -ES       Row Name 04/04/24 1539          Therapy Assessment/Plan (OT)    Planned Therapy Interventions (OT) activity tolerance training;BADL retraining;functional balance retraining;occupation/activity based interventions;patient/caregiver education/training;strengthening exercise;transfer/mobility retraining  -ES               User Key  (r) = Recorded By, (t) = Taken By, (c) = Cosigned By      Initials Name Provider Type    ES Susana Wiley, OTR/L, CSRS Occupational Therapist                   Clinical Impression       Row Name 04/04/24 1543 04/04/24 1538       Plan of Care Review    Plan of Care Reviewed With -- patient  -ES    Outcome Evaluation Patient provided education and training on use of adaptive strategies to increase patient safety and independence with B and IADL task engagement, transfer training to maxamize patient safety with all functional transfers, and home modification for patient success and independence at time of discharge. Patient receptive to all education and training provided.  -ES Patient has experienced decline in function from baseline status, presenting w/ deficits related to balance, strength, tolerance, transfers and mobility that impede patient independence with activities of daily living.  Patient would benefit from skilled Occupational Therapy intervention to maxamize patient safety, and promote return to baseline independence.  -ES      Row Name 04/04/24 1538          Therapy Assessment/Plan (OT)    Rehab Potential (OT) good, to achieve stated therapy goals  -ES     Criteria for Skilled Therapeutic Interventions Met (OT) yes;meets criteria;skilled treatment is necessary  -ES     Therapy Frequency (OT) 5 times/wk  -ES       Row Name  04/04/24 1538          Therapy Plan Review/Discharge Plan (OT)    Anticipated Discharge Disposition (OT) home with outpatient therapy services  -ES       Row Name 04/04/24 1538          Positioning and Restraints    Pre-Treatment Position sitting in chair/recliner  -ES     Post Treatment Position chair  -ES     In Chair reclined;call light within reach;encouraged to call for assist;exit alarm on  -ES               User Key  (r) = Recorded By, (t) = Taken By, (c) = Cosigned By      Initials Name Provider Type    Susana Odell, OTR/L, CSRS Occupational Therapist                   Outcome Measures       Row Name 04/04/24 1539          How much help from another is currently needed...    Putting on and taking off regular lower body clothing? 3  -ES     Bathing (including washing, rinsing, and drying) 3  -ES     Toileting (which includes using toilet bed pan or urinal) 3  -ES     Putting on and taking off regular upper body clothing 4  -ES     Taking care of personal grooming (such as brushing teeth) 4  -ES     Eating meals 4  -ES     AM-PAC 6 Clicks Score (OT) 21  -ES       Row Name 04/04/24 1200 04/04/24 0800       How much help from another person do you currently need...    Turning from your back to your side while in flat bed without using bedrails? 4 (P)   -SM 4  -AN    Moving from lying on back to sitting on the side of a flat bed without bedrails? 4 (P)   -SM 3  -AN    Moving to and from a bed to a chair (including a wheelchair)? 4 (P)   -SM 3  -AN    Standing up from a chair using your arms (e.g., wheelchair, bedside chair)? 4 (P)   -SM 3  -AN    Climbing 3-5 steps with a railing? 3 (P)   -SM 3  -AN    To walk in hospital room? 4 (P)   -SM 3  -AN    AM-PAC 6 Clicks Score (PT) 23 (P)   -SM 19  -AN    Highest Level of Mobility Goal 7 --> Walk 25 feet or more (P)   -SM 6 --> Walk 10 steps or more  -AN      Row Name 04/04/24 1539          Functional Assessment    Outcome Measure Options AM-PAC 6 Clicks Daily  Activity (OT)  -ES               User Key  (r) = Recorded By, (t) = Taken By, (c) = Cosigned By      Initials Name Provider Type    Susana Odell, OTR/L, CSRS Occupational Therapist    Renetta Maki, RN Registered Nurse    Chelsey Sneed, PTA Student PTA Student                    Occupational Therapy Education       Title: PT OT SLP Therapies (Resolved)       Topic: Occupational Therapy (Resolved)       Point: ADL training (Resolved)       Description:   Instruct learner(s) on proper safety adaptation and remediation techniques during self care or transfers.   Instruct in proper use of assistive devices.                  Learner Progress:  Not documented in this visit.              Point: Home exercise program (Resolved)       Description:   Instruct learner(s) on appropriate technique for monitoring, assisting and/or progressing therapeutic exercises/activities.                  Learner Progress:  Not documented in this visit.              Point: Precautions (Resolved)       Description:   Instruct learner(s) on prescribed precautions during self-care and functional transfers.                  Learner Progress:  Not documented in this visit.              Point: Body mechanics (Resolved)       Description:   Instruct learner(s) on proper positioning and spine alignment during self-care, functional mobility activities and/or exercises.                  Learner Progress:  Not documented in this visit.                                  OT Recommendation and Plan  Planned Therapy Interventions (OT): activity tolerance training, BADL retraining, functional balance retraining, occupation/activity based interventions, patient/caregiver education/training, strengthening exercise, transfer/mobility retraining  Therapy Frequency (OT): 5 times/wk  Plan of Care Review  Plan of Care Reviewed With: patient  Outcome Evaluation: Patient provided education and training on use of adaptive strategies to increase patient  safety and independence with B and IADL task engagement, transfer training to maxamize patient safety with all functional transfers, and home modification for patient success and independence at time of discharge. Patient receptive to all education and training provided.     Time Calculation:   Evaluation Complexity (OT)  Review Occupational Profile/Medical/Therapy History Complexity: brief/low complexity  Assessment, Occupational Performance/Identification of Deficit Complexity: 3-5 performance deficits  Clinical Decision Making Complexity (OT): problem focused assessment/low complexity  Overall Complexity of Evaluation (OT): low complexity     Time Calculation- OT       Row Name 04/04/24 1540 04/04/24 1248          Time Calculation- OT    OT Received On 04/04/24  -ES --     OT Goal Re-Cert Due Date 04/13/24  -ES --        Timed Charges    37557 - Gait Training Minutes  -- 8 (P)   -     20508 - OT Self Care/Mgmt Minutes 15  -ES --        Untimed Charges    OT Eval/Re-eval Minutes 20  -ES --        Total Minutes    Timed Charges Total Minutes 15  -ES 8 (P)   -SM     Untimed Charges Total Minutes 20  -ES --      Total Minutes 35  -ES 8 (P)   -               User Key  (r) = Recorded By, (t) = Taken By, (c) = Cosigned By      Initials Name Provider Type    ES Susana Wiley, OTR/L, CSRS Occupational Therapist     Chelsey Sánchez, PTA Student PTA Student                  Therapy Charges for Today       Code Description Service Date Service Provider Modifiers Qty    [id number]  OT SELF CARE/MGMT/TRAIN EA 15 MIN 4/4/2024 Susana Wiley, OTR/L, CSRS GO 1    [id number] HC OT EVAL LOW COMPLEXITY 2 4/4/2024 Susana Wiley, OTR/L, CSRS GO 1                 Susana Wiley, OTR/L, CSRS  4/4/2024

## 2024-04-04 NOTE — SIGNIFICANT NOTE
04/04/24 0740   Plan   Final Discharge Disposition Code 01 - home or self-care   Final Note PTA Kathi. Appt: Appt: 4/5/24 at 10AM.

## 2024-04-05 ENCOUNTER — TELEPHONE (OUTPATIENT)
Dept: ORTHOPEDIC SURGERY | Facility: CLINIC | Age: 74
End: 2024-04-05
Payer: COMMERCIAL

## 2024-04-05 DIAGNOSIS — Z47.89 AFTERCARE FOLLOWING SURGERY OF THE MUSCULOSKELETAL SYSTEM: Primary | ICD-10-CM

## 2024-04-05 RX ORDER — OXYCODONE AND ACETAMINOPHEN 7.5; 325 MG/1; MG/1
1-2 TABLET ORAL EVERY 4 HOURS PRN
Qty: 40 TABLET | Refills: 0 | Status: SHIPPED | OUTPATIENT
Start: 2024-04-05

## 2024-04-16 ENCOUNTER — OFFICE VISIT (OUTPATIENT)
Dept: ORTHOPEDIC SURGERY | Facility: CLINIC | Age: 74
End: 2024-04-16
Payer: MEDICARE

## 2024-04-16 VITALS
HEART RATE: 51 BPM | SYSTOLIC BLOOD PRESSURE: 150 MMHG | BODY MASS INDEX: 35.64 KG/M2 | HEIGHT: 67 IN | WEIGHT: 227.07 LBS | OXYGEN SATURATION: 95 % | DIASTOLIC BLOOD PRESSURE: 82 MMHG

## 2024-04-16 DIAGNOSIS — M25.562 LEFT KNEE PAIN, UNSPECIFIED CHRONICITY: ICD-10-CM

## 2024-04-16 DIAGNOSIS — Z47.89 AFTERCARE FOLLOWING SURGERY OF THE MUSCULOSKELETAL SYSTEM: Primary | ICD-10-CM

## 2024-04-16 PROCEDURE — 1160F RVW MEDS BY RX/DR IN RCRD: CPT | Performed by: PHYSICIAN ASSISTANT

## 2024-04-16 PROCEDURE — 3079F DIAST BP 80-89 MM HG: CPT | Performed by: PHYSICIAN ASSISTANT

## 2024-04-16 PROCEDURE — 3077F SYST BP >= 140 MM HG: CPT | Performed by: PHYSICIAN ASSISTANT

## 2024-04-16 PROCEDURE — 1159F MED LIST DOCD IN RCRD: CPT | Performed by: PHYSICIAN ASSISTANT

## 2024-04-16 PROCEDURE — 99024 POSTOP FOLLOW-UP VISIT: CPT | Performed by: PHYSICIAN ASSISTANT

## 2024-04-16 RX ORDER — BLOOD-GLUCOSE METER
KIT MISCELLANEOUS
COMMUNITY
Start: 2024-02-26

## 2024-04-16 RX ORDER — LANCETS 28 GAUGE
EACH MISCELLANEOUS
COMMUNITY
Start: 2024-02-26

## 2024-04-16 NOTE — PROGRESS NOTES
"Chief Complaint  Pain and Follow-up of the Left Knee and Suture / Staple Removal    Subjective          History of Present Illness      Christopher Finley is a 73 y.o. male  presents to [facility] for     Patient presents for 2-week postop evaluation of left total knee arthroplasty, 4/3/2024.  Patient and his wife state he has her been recovering well he presents with a walker.  He states he is mainly taking Tylenol now for pain he stopped the pain medicine on  he states that he is attending therapy 3 times a week at [facility].  He is also taking DVT prophylaxis.  He is also doing home exercises.  They state the incision has been healing well and deny redness drainage or dehiscence he also denies calf pain.      Allergies   Allergen Reactions    Amoxicillin Hives and Rash        Social History     Socioeconomic History    Marital status:    Tobacco Use    Smoking status: Former     Current packs/day: 0.00     Types: Cigarettes     Quit date: 1970     Years since quittin.8    Smokeless tobacco: Never   Vaping Use    Vaping status: Never Used   Substance and Sexual Activity    Alcohol use: Never    Drug use: Never    Sexual activity: Defer        REVIEW OF SYSTEMS    Constitutional: Awake alert and oriented x3, no acute distress, denies fevers, chills, weight loss  Respiratory: No respiratory distress  Vascular: Brisk cap refill, Intact distal pulses, No cyanosis, compartments soft with no signs or symptoms of compartment syndrome or DVT.   Cardiovascular: Denies chest pain, shortness of breath  Skin: Denies rashes, acute skin changes  Neurologic: Denies headache, loss of consciousness  MSK: Left knee pain      Objective   Vital Signs:   /82   Pulse 51   Ht 170.2 cm (67\")   Wt 103 kg (227 lb 1.2 oz)   SpO2 95%   BMI 35.56 kg/m²     Body mass index is 35.56 kg/m².    Physical Exam       Left knee: Incision is healing well, no erythema, no drainage or " dehiscence, no signs of infection, full extension flexion 90, stable to varus/valgus stress stable anterior/posterior drawer, patient able to straight leg raise, nontender calf, negative Qing testing patient ambulates with mild antalgic gait with his walker.      Procedures    Imaging Results (Most Recent)       Procedure Component Value Units Date/Time    XR Knee 3 View Left [[id number]] Resulted: 04/16/24 1043     Updated: 04/16/24 1044    Narrative:      View:AP/Lateral and Sunrise view(s)  Site: Left knee  Indication: Left knee pain  Study: X-rays ordered, taken in the office, and reviewed today  X-ray: Intact appearing left total knee arthroplasty, no signs of hardware   failure or loosening, no subsidence or periprosthetic fracture, good   alignment  Comparative data: Previous studies             Result Review :   The following data was reviewed by: DAI Martinez on 04/16/2024:  Data reviewed : Radiologic studies reviewed by me with the patient and his wife              Assessment and Plan    Diagnoses and all orders for this visit:    1. Aftercare following surgery of left total knee arthroplasty, 4/3/2024 (Primary)    2. Left knee pain, unspecified chronicity  -     XR Knee 3 View Left        Reviewed x-rays with the patient and his wife discussed diagnosis and treatment options with them patient was advised to continue therapy, continue weightbearing as tolerated with walker use.  He was advised to take pain meds as needed.  Continue/finish DVT prophylaxis.  Sutures/staples removed in office today. Steri-strips applied. Discussed incision care/hygiene. May shower, but do not submerge in water until fully healed.  Advised patient that if any concerning symptoms regarding incision appearance occur that they should call us right away, patient expressed understanding.  Follow-up in 4 weeks for recheck    Call or return if worsening symptoms.    Follow Up   Return in about 4 weeks (around  5/14/2024).  Patient was given instructions and counseling regarding his condition or for health maintenance advice. Please see specific information pulled into the AVS if appropriate.       EMR Dragon/Transcription disclaimer:  Part of this note may be an electronic transcription/translation of spoken language to printed text using the Dragon Dictation System

## 2024-04-23 ENCOUNTER — TELEPHONE (OUTPATIENT)
Dept: ORTHOPEDIC SURGERY | Facility: CLINIC | Age: 74
End: 2024-04-23
Payer: COMMERCIAL

## 2024-04-23 NOTE — TELEPHONE ENCOUNTER
PATIENT REQUESTING LEFT ELBOW STEROID INJECTION. LAST INJECTION 08/11/2023.   PATIENT WANTED TO CHECK AND MAKE SURE IT IS OKAY TO HAVE INJECTION SINCE HE JUST HAD THE LEFT KNEE REPLACED ON 04/03/24.  PER DR. GARCIA PATIENT CAN SCHEDULE AT ANYTIME FOR ELBOW INJECTION.  CAN SOMEONE PLEASE CALL PATIENT TO SCHEDULE?

## 2024-04-25 ENCOUNTER — OFFICE VISIT (OUTPATIENT)
Dept: ORTHOPEDIC SURGERY | Facility: CLINIC | Age: 74
End: 2024-04-25
Payer: MEDICARE

## 2024-04-25 VITALS
SYSTOLIC BLOOD PRESSURE: 181 MMHG | DIASTOLIC BLOOD PRESSURE: 76 MMHG | HEART RATE: 53 BPM | BODY MASS INDEX: 35.64 KG/M2 | WEIGHT: 227.07 LBS | HEIGHT: 67 IN | OXYGEN SATURATION: 95 %

## 2024-04-25 DIAGNOSIS — M77.12 LEFT LATERAL EPICONDYLITIS: Primary | ICD-10-CM

## 2024-04-25 RX ORDER — LIDOCAINE HYDROCHLORIDE 10 MG/ML
1 INJECTION, SOLUTION INFILTRATION; PERINEURAL
Status: COMPLETED | OUTPATIENT
Start: 2024-04-25 | End: 2024-04-25

## 2024-04-25 RX ORDER — TRIAMCINOLONE ACETONIDE 40 MG/ML
40 INJECTION, SUSPENSION INTRA-ARTICULAR; INTRAMUSCULAR
Status: COMPLETED | OUTPATIENT
Start: 2024-04-25 | End: 2024-04-25

## 2024-04-25 RX ORDER — DOCUSATE SODIUM 100 MG/1
CAPSULE, LIQUID FILLED ORAL
COMMUNITY
Start: 2024-04-17

## 2024-04-25 RX ADMIN — TRIAMCINOLONE ACETONIDE 40 MG: 40 INJECTION, SUSPENSION INTRA-ARTICULAR; INTRAMUSCULAR at 11:15

## 2024-04-25 RX ADMIN — LIDOCAINE HYDROCHLORIDE 1 ML: 10 INJECTION, SOLUTION INFILTRATION; PERINEURAL at 11:15

## 2024-04-25 NOTE — PROGRESS NOTES
"Chief Complaint  Pain and Follow-up of the Left Elbow    Subjective          Pain        Christopher Finley is a 73 y.o. male  presents to [facility] for     Patient presents with his wife for evaluation of left elbow pain he states his elbow has not caused him pain for a while he was last seen in 2021 for the elbow he denies new injury or symptoms of pain but states the pain has worsened as he has been doing therapy for his knee replacement.  He points to the lateral elbow as area of pain he denies difficulty with range of motion but admits to pain with certain movements especially  and twist type movements.      Allergies   Allergen Reactions    Amoxicillin Hives and Rash        Social History     Socioeconomic History    Marital status:    Tobacco Use    Smoking status: Former     Current packs/day: 0.00     Types: Cigarettes     Quit date: 1970     Years since quittin.8    Smokeless tobacco: Never   Vaping Use    Vaping status: Never Used   Substance and Sexual Activity    Alcohol use: Never    Drug use: Never    Sexual activity: Defer        REVIEW OF SYSTEMS    Constitutional: Awake alert and oriented x3, no acute distress, denies fevers, chills, weight loss  Respiratory: No respiratory distress  Vascular: Brisk cap refill, Intact distal pulses, No cyanosis, compartments soft with no signs or symptoms of compartment syndrome or DVT.   Cardiovascular: Denies chest pain, shortness of breath  Skin: Denies rashes, acute skin changes  Neurologic: Denies headache, loss of consciousness  MSK: Left elbow pain      Objective   Vital Signs:   BP (!) 181/76   Pulse 53   Ht 170.2 cm (67\")   Wt 103 kg (227 lb 1.2 oz)   SpO2 95%   BMI 35.56 kg/m²     Body mass index is 35.56 kg/m².    Physical Exam          Left elbow: Skin is intact, no erythema, no ecchymosis or swelling, no signs of infection no pain with resisted wrist extension, no pain with wrist resisted " third finger extension, tenderness to palpation of the lateral elbow, nontender anterior posterior or medial elbow.  Full extension, flexion full, full supination and pronation, neurovascularly intact. ROM 0-150 degrees.       Medium Joint Arthrocentesis: L elbow  Date/Time: 4/25/2024 11:15 AM  Consent given by: patient  Site marked: site marked  Timeout: Immediately prior to procedure a time out was called to verify the correct patient, procedure, equipment, support staff and site/side marked as required   Supporting Documentation  Indications: pain   Procedure Details  Location: elbow - L elbow  Preparation: Patient was prepped and draped in the usual sterile fashion  Needle size: 23 G  Medications administered: 1 mL lidocaine 1 %; 40 mg triamcinolone acetonide 40 MG/ML  Patient tolerance: patient tolerated the procedure well with no immediate complications (INJECTION SCRIBED FOR JORGE LALA)        Imaging Results (Most Recent)       None             Result Review :   The following data was reviewed by: DAI Martinez on 04/25/2024:               Assessment and Plan    Diagnoses and all orders for this visit:    1. Left lateral epicondylitis (Primary)    Other orders  -     Medium Joint Arthrocentesis: L elbow        Discussed diagnosis and treatment options with the patient his wife he elected to have left lateral elbow/tennis elbow injection which she tolerated well follow-up as needed    Call or return if worsening symptoms.    Follow Up   Return if symptoms worsen or fail to improve.  Patient was given instructions and counseling regarding his condition or for health maintenance advice. Please see specific information pulled into the AVS if appropriate.       EMR Dragon/Transcription disclaimer:  Part of this note may be an electronic transcription/translation of spoken language to printed text using the Dragon Dictation System

## 2024-05-14 ENCOUNTER — OFFICE VISIT (OUTPATIENT)
Dept: ORTHOPEDIC SURGERY | Facility: CLINIC | Age: 74
End: 2024-05-14
Payer: MEDICARE

## 2024-05-14 VITALS
OXYGEN SATURATION: 94 % | HEART RATE: 56 BPM | WEIGHT: 227.07 LBS | HEIGHT: 67 IN | SYSTOLIC BLOOD PRESSURE: 143 MMHG | DIASTOLIC BLOOD PRESSURE: 79 MMHG | BODY MASS INDEX: 35.64 KG/M2

## 2024-05-14 DIAGNOSIS — Z47.89 AFTERCARE FOLLOWING SURGERY OF THE MUSCULOSKELETAL SYSTEM: Primary | ICD-10-CM

## 2024-05-14 DIAGNOSIS — M76.32 IT BAND SYNDROME, LEFT: ICD-10-CM

## 2024-05-14 PROCEDURE — 1159F MED LIST DOCD IN RCRD: CPT | Performed by: PHYSICIAN ASSISTANT

## 2024-05-14 PROCEDURE — 3078F DIAST BP <80 MM HG: CPT | Performed by: PHYSICIAN ASSISTANT

## 2024-05-14 PROCEDURE — 99024 POSTOP FOLLOW-UP VISIT: CPT | Performed by: PHYSICIAN ASSISTANT

## 2024-05-14 PROCEDURE — 1160F RVW MEDS BY RX/DR IN RCRD: CPT | Performed by: PHYSICIAN ASSISTANT

## 2024-05-14 PROCEDURE — 3077F SYST BP >= 140 MM HG: CPT | Performed by: PHYSICIAN ASSISTANT

## 2024-05-14 RX ORDER — MELATONIN
COMMUNITY
Start: 2024-05-13

## 2024-05-14 RX ORDER — ASPIRIN 81 MG/1
TABLET, COATED ORAL
COMMUNITY
Start: 2024-05-13

## 2024-05-14 RX ORDER — DILTIAZEM HYDROCHLORIDE 180 MG/1
CAPSULE, COATED, EXTENDED RELEASE ORAL
COMMUNITY
Start: 2024-05-13

## 2024-05-14 RX ORDER — ROSUVASTATIN CALCIUM 40 MG/1
TABLET, COATED ORAL
COMMUNITY
Start: 2024-05-13

## 2024-05-14 NOTE — PROGRESS NOTES
"Chief Complaint  Pain and Follow-up of the Left Knee    Subjective          History of Present Illness      Christopher Finley is a 73 y.o. male  presents to [facility] for     Patient presents with his wife for follow-up evaluation of left total knee arthroplasty 4/3/2024 patient and wife state that he has been doing well.  He presents with therapy note that states he has full extension and flexion to 123 degrees.  Patient has been mainly taking Tylenol for pain he states that he is also very compliant with home exercises he finished DVT prophylaxis and denies calf pain patient is ambulating with a cane.  He states he has pain to the lateral knee in the area of the IT band insertion site    Allergies   Allergen Reactions    Amoxicillin Hives and Rash        Social History     Socioeconomic History    Marital status:    Tobacco Use    Smoking status: Former     Current packs/day: 0.00     Types: Cigarettes     Quit date: 1970     Years since quittin.9    Smokeless tobacco: Never   Vaping Use    Vaping status: Never Used   Substance and Sexual Activity    Alcohol use: Never    Drug use: Never    Sexual activity: Defer        REVIEW OF SYSTEMS    Constitutional: Awake alert and oriented x3, no acute distress, denies fevers, chills, weight loss  Respiratory: No respiratory distress  Vascular: Brisk cap refill, Intact distal pulses, No cyanosis, compartments soft with no signs or symptoms of compartment syndrome or DVT.   Cardiovascular: Denies chest pain, shortness of breath  Skin: Denies rashes, acute skin changes  Neurologic: Denies headache, loss of consciousness  MSK: Left knee pain      Objective   Vital Signs:   /79   Pulse 56   Ht 170.2 cm (67\")   Wt 103 kg (227 lb 1.2 oz)   SpO2 94%   BMI 35.56 kg/m²     Body mass index is 35.56 kg/m².    Physical Exam       Left knee: Tender to palpation of lateral knee at the IT band insertion site, incision is well-healed, " no erythema or drainage, no dehiscence, mild generalized swelling to the knee, no pain with range of motion, full extension flexion 120, stable to varus/valgus stress stable to anterior/posterior drawer, nontender calf, negative Qing testing      Procedures    Imaging Results (Most Recent)       None             Result Review :   The following data was reviewed by: DAI Martinez on 05/14/2024:               Assessment and Plan    Diagnoses and all orders for this visit:    1. Aftercare following surgery of left total knee arthroplasty 4/3/24 (Primary)  -     Ambulatory Referral to Physical Therapy    2. It band syndrome, left  -     Ambulatory Referral to Physical Therapy        Discussed diagnosis and treatment options with the patient he was advised recommend continuing therapy for strength and range of motion he was given a new order for this, continue weightbearing and home exercises as tolerated follow-up in 6 weeks with x-rays    Call or return if worsening symptoms.    Follow Up   Return in about 6 weeks (around 6/25/2024).  Patient was given instructions and counseling regarding his condition or for health maintenance advice. Please see specific information pulled into the AVS if appropriate.       EMR Dragon/Transcription disclaimer:  Part of this note may be an electronic transcription/translation of spoken language to printed text using the Dragon Dictation System

## 2024-06-04 ENCOUNTER — OFFICE VISIT (OUTPATIENT)
Dept: ORTHOPEDIC SURGERY | Facility: CLINIC | Age: 74
End: 2024-06-04
Payer: MEDICARE

## 2024-06-04 VITALS
HEART RATE: 59 BPM | HEIGHT: 67 IN | SYSTOLIC BLOOD PRESSURE: 120 MMHG | DIASTOLIC BLOOD PRESSURE: 73 MMHG | BODY MASS INDEX: 35.63 KG/M2 | WEIGHT: 227 LBS | OXYGEN SATURATION: 95 %

## 2024-06-04 DIAGNOSIS — Z47.89 AFTERCARE FOLLOWING SURGERY OF THE MUSCULOSKELETAL SYSTEM: Primary | ICD-10-CM

## 2024-06-04 PROCEDURE — 1159F MED LIST DOCD IN RCRD: CPT | Performed by: PHYSICIAN ASSISTANT

## 2024-06-04 PROCEDURE — 99213 OFFICE O/P EST LOW 20 MIN: CPT | Performed by: PHYSICIAN ASSISTANT

## 2024-06-04 PROCEDURE — 3074F SYST BP LT 130 MM HG: CPT | Performed by: PHYSICIAN ASSISTANT

## 2024-06-04 PROCEDURE — 3078F DIAST BP <80 MM HG: CPT | Performed by: PHYSICIAN ASSISTANT

## 2024-06-04 PROCEDURE — 1160F RVW MEDS BY RX/DR IN RCRD: CPT | Performed by: PHYSICIAN ASSISTANT

## 2024-06-04 NOTE — PROGRESS NOTES
"Chief Complaint  Follow-up of the Right Knee    Subjective          History of Present Illness      Christopher Finley is a 73 y.o. male  presents to [facility] for     Patient presents for follow-up evaluation of right total knee arthroplasty, 2022.  Patient states that he continues to be happy with his right knee he has no complaints with activity range of motion or weightbearing activity.  No new complaints.      Allergies   Allergen Reactions    Amoxicillin Hives and Rash        Social History     Socioeconomic History    Marital status:    Tobacco Use    Smoking status: Former     Current packs/day: 0.00     Types: Cigarettes     Quit date: 1970     Years since quittin.9    Smokeless tobacco: Never   Vaping Use    Vaping status: Never Used   Substance and Sexual Activity    Alcohol use: Never    Drug use: Never    Sexual activity: Defer        REVIEW OF SYSTEMS    Constitutional: Awake alert and oriented x3, no acute distress, denies fevers, chills, weight loss  Respiratory: No respiratory distress  Vascular: Brisk cap refill, Intact distal pulses, No cyanosis, compartments soft with no signs or symptoms of compartment syndrome or DVT.   Cardiovascular: Denies chest pain, shortness of breath  Skin: Denies rashes, acute skin changes  Neurologic: Denies headache, loss of consciousness  MSK: Right knee pain      Objective   Vital Signs:   /73   Pulse 59   Ht 170.2 cm (67\")   Wt 103 kg (227 lb)   SpO2 95%   BMI 35.55 kg/m²     Body mass index is 35.55 kg/m².    Physical Exam       Right knee: Well-healed incision, no erythema, no ecchymosis or swelling, no signs of infection flexion 120, full extension, stable to varus/valgus stress stable anterior/posterior drawer, neurovascularly intact.      Procedures    Imaging Results (Most Recent)       Procedure Component Value Units Date/Time    XR Knee 3 View Right [[id number]] Resulted: 24 1132     Updated: " 06/04/24 1133    Narrative:      View:AP/Lateral and [place] view(s)  Site: Right knee  Indication: Right knee pain  Study: X-rays ordered, taken in the office, and reviewed today  X-ray: Intact appearing right total knee arthroplasty, no signs of   hardware failure or loosening, no subsidence or periprosthetic fracture,   good alignment  Comparative data: Previous studies             Result Review :   The following data was reviewed by: DAI Martinez on 06/04/2024:  Data reviewed : Radiologic studies reviewed by me with the patient              Assessment and Plan    Diagnoses and all orders for this visit:    1. Aftercare following surgery of right total knee arthroplasty 6/1/2022 (Primary)  -     XR Knee 3 View Right        Reviewed x-rays with the patient his wife discussed diagnosis and treatment options with them they were advised to continue activity and weightbearing as tolerated for the right knee follow-up as needed for the right knee.    Call or return if worsening symptoms.    Follow Up   Return if symptoms worsen or fail to improve.  Patient was given instructions and counseling regarding his condition or for health maintenance advice. Please see specific information pulled into the AVS if appropriate.       EMR Dragon/Transcription disclaimer:  Part of this note may be an electronic transcription/translation of spoken language to printed text using the Dragon Dictation System

## 2024-06-24 PROBLEM — Z47.1 AFTERCARE FOLLOWING LEFT KNEE JOINT REPLACEMENT SURGERY: Status: ACTIVE | Noted: 2024-06-24

## 2024-06-24 PROBLEM — Z96.652 AFTERCARE FOLLOWING LEFT KNEE JOINT REPLACEMENT SURGERY: Status: ACTIVE | Noted: 2024-06-24

## 2024-06-25 ENCOUNTER — OFFICE VISIT (OUTPATIENT)
Dept: ORTHOPEDIC SURGERY | Facility: CLINIC | Age: 74
End: 2024-06-25
Payer: MEDICARE

## 2024-06-25 VITALS
OXYGEN SATURATION: 94 % | HEART RATE: 64 BPM | SYSTOLIC BLOOD PRESSURE: 137 MMHG | HEIGHT: 67 IN | WEIGHT: 227 LBS | BODY MASS INDEX: 35.63 KG/M2 | DIASTOLIC BLOOD PRESSURE: 83 MMHG

## 2024-06-25 DIAGNOSIS — Z96.652 AFTERCARE FOLLOWING LEFT KNEE JOINT REPLACEMENT SURGERY: Primary | ICD-10-CM

## 2024-06-25 DIAGNOSIS — Z47.1 AFTERCARE FOLLOWING LEFT KNEE JOINT REPLACEMENT SURGERY: Primary | ICD-10-CM

## 2024-06-25 PROCEDURE — 1159F MED LIST DOCD IN RCRD: CPT | Performed by: PHYSICIAN ASSISTANT

## 2024-06-25 PROCEDURE — 99024 POSTOP FOLLOW-UP VISIT: CPT | Performed by: PHYSICIAN ASSISTANT

## 2024-06-25 PROCEDURE — 3075F SYST BP GE 130 - 139MM HG: CPT | Performed by: PHYSICIAN ASSISTANT

## 2024-06-25 PROCEDURE — 3079F DIAST BP 80-89 MM HG: CPT | Performed by: PHYSICIAN ASSISTANT

## 2024-06-25 PROCEDURE — 1160F RVW MEDS BY RX/DR IN RCRD: CPT | Performed by: PHYSICIAN ASSISTANT

## 2024-06-25 NOTE — PROGRESS NOTES
"Chief Complaint  Follow-up of the Left Knee    Subjective          History of Present Illness      Christopher Finley is a 73 y.o. male  presents to [facility] for     Patient presents for follow-up evaluation of left total knee arthroplasty 4/3/2024.  Patient is here with his wife.  He states he has maxed out his exercises with therapy he is ready to graduate/be released.  He denies pain to the knee denies swelling, denies difficulty with range of motion he is very happy with his progress, no new complaints.      Allergies   Allergen Reactions    Amoxicillin Hives and Rash        Social History     Socioeconomic History    Marital status:    Tobacco Use    Smoking status: Former     Current packs/day: 0.00     Types: Cigarettes     Quit date: 1970     Years since quittin.0    Smokeless tobacco: Never   Vaping Use    Vaping status: Never Used   Substance and Sexual Activity    Alcohol use: Never    Drug use: Never    Sexual activity: Defer        REVIEW OF SYSTEMS    Constitutional: Awake alert and oriented x3, no acute distress, denies fevers, chills, weight loss  Respiratory: No respiratory distress  Vascular: Brisk cap refill, Intact distal pulses, No cyanosis, compartments soft with no signs or symptoms of compartment syndrome or DVT.   Cardiovascular: Denies chest pain, shortness of breath  Skin: Denies rashes, acute skin changes  Neurologic: Denies headache, loss of consciousness  MSK: Left knee pain      Objective   Vital Signs:   /83   Pulse 64   Ht 170.2 cm (67\")   Wt 103 kg (227 lb)   SpO2 94%   BMI 35.55 kg/m²     Body mass index is 35.55 kg/m².    Physical Exam       Left knee: Incision well-healed, no erythema, no ecchymosis, no swelling, no effusion, no signs of infection, full extension, flexion 120, stable anterior/posterior drawer, stable to varus/valgus stress.  Nontender to palpation, no pain with range of motion.  5 out of 5 strength, no pain " with resisted range of motion, nontender calf, negative Qing testing      Procedures    Imaging Results (Most Recent)       Procedure Component Value Units Date/Time    XR Knee 3 View Left [[id number]] Resulted: 06/25/24 1147     Updated: 06/25/24 1147    Narrative:      View:AP/Lateral and [place] view(s)  Site: Left knee  Indication: Left knee pain  Study: X-rays ordered, taken in the office, and reviewed today  X-ray: Intact appearing left total knee arthroplasty, no signs of hardware   failure or loosening, no subsidence or periprosthetic fracture, good   alignment  Comparative data: Previous studies             Result Review :   The following data was reviewed by: DAI Martinez on 06/25/2024:  Data reviewed : Radiologic studies reviewed by me with the patient and his wife              Assessment and Plan    Diagnoses and all orders for this visit:    1. Aftercare following left knee joint replacement surgery 4/3/2024 (Primary)  -     XR Knee 3 View Left        Reviewed x-rays with the patient his wife discussed diagnosis and treatment options with them patient will continue weightbearing and activity as tolerated he may finish therapy follow-up in 3 months with x-rays    Call or return if worsening symptoms.    Follow Up   Return in about 3 months (around 9/25/2024) for Recheck.  Patient was given instructions and counseling regarding his condition or for health maintenance advice. Please see specific information pulled into the AVS if appropriate.       EMR Dragon/Transcription disclaimer:  Part of this note may be an electronic transcription/translation of spoken language to printed text using the Dragon Dictation System

## 2024-07-23 ENCOUNTER — TELEPHONE (OUTPATIENT)
Dept: ORTHOPEDIC SURGERY | Facility: CLINIC | Age: 74
End: 2024-07-23
Payer: COMMERCIAL

## 2024-07-23 DIAGNOSIS — Z47.89 AFTERCARE FOLLOWING SURGERY OF THE MUSCULOSKELETAL SYSTEM: ICD-10-CM

## 2024-07-23 RX ORDER — CLINDAMYCIN HYDROCHLORIDE 300 MG/1
CAPSULE ORAL
Qty: 2 CAPSULE | Refills: 2 | Status: SHIPPED | OUTPATIENT
Start: 2024-07-23

## 2024-07-23 NOTE — TELEPHONE ENCOUNTER
PATIENT REQUESTING PRE MED ANTIBIOTIC FOR UPCOMING DENTAL APPT.   CLINDAMYCIN 300MG SENT TO [facility] PER PROTOCOL.

## 2024-08-27 DIAGNOSIS — Z47.89 AFTERCARE FOLLOWING SURGERY OF THE MUSCULOSKELETAL SYSTEM: ICD-10-CM

## 2024-08-27 RX ORDER — CLINDAMYCIN HCL 300 MG
CAPSULE ORAL
Qty: 2 CAPSULE | Refills: 2 | Status: SHIPPED | OUTPATIENT
Start: 2024-08-27

## 2024-09-30 ENCOUNTER — OFFICE VISIT (OUTPATIENT)
Dept: ORTHOPEDIC SURGERY | Facility: CLINIC | Age: 74
End: 2024-09-30
Payer: MEDICARE

## 2024-09-30 VITALS
HEART RATE: 50 BPM | DIASTOLIC BLOOD PRESSURE: 74 MMHG | OXYGEN SATURATION: 92 % | BODY MASS INDEX: 34.21 KG/M2 | WEIGHT: 218 LBS | SYSTOLIC BLOOD PRESSURE: 120 MMHG | HEIGHT: 67 IN

## 2024-09-30 DIAGNOSIS — Z96.652 AFTERCARE FOLLOWING LEFT KNEE JOINT REPLACEMENT SURGERY: Primary | ICD-10-CM

## 2024-09-30 DIAGNOSIS — Z47.1 AFTERCARE FOLLOWING LEFT KNEE JOINT REPLACEMENT SURGERY: Primary | ICD-10-CM

## 2024-09-30 PROCEDURE — 99213 OFFICE O/P EST LOW 20 MIN: CPT | Performed by: PHYSICIAN ASSISTANT

## 2024-09-30 PROCEDURE — 1159F MED LIST DOCD IN RCRD: CPT | Performed by: PHYSICIAN ASSISTANT

## 2024-09-30 PROCEDURE — 1160F RVW MEDS BY RX/DR IN RCRD: CPT | Performed by: PHYSICIAN ASSISTANT

## 2024-09-30 PROCEDURE — 3078F DIAST BP <80 MM HG: CPT | Performed by: PHYSICIAN ASSISTANT

## 2024-09-30 PROCEDURE — 3074F SYST BP LT 130 MM HG: CPT | Performed by: PHYSICIAN ASSISTANT

## 2024-09-30 NOTE — PROGRESS NOTES
"Chief Complaint  Follow-up of the Left Knee    Subjective          History of Present Illness      Christopher Finley is a 74 y.o. male  presents to [facility] for     Patient presents with his wife for follow-up evaluation of left total knee arthroplasty 4/3/2024.  Patient states his left knee is \"doing good \".  He states \"I can walk \".  He states he walks about a mile 3-4 times a week sometimes 7 days a week.  He denies locking catching buckling swelling of the knee denies difficulty with range of motion he does admit to a clicking sound overall he is happy with his left knee replacement, denies need for pain medication or NSAIDs, no new complaints      Allergies   Allergen Reactions    Amoxicillin Hives and Rash        Social History     Socioeconomic History    Marital status:    Tobacco Use    Smoking status: Former     Current packs/day: 0.00     Types: Cigarettes     Quit date: 1970     Years since quittin.2    Smokeless tobacco: Never   Vaping Use    Vaping status: Never Used   Substance and Sexual Activity    Alcohol use: Never    Drug use: Never    Sexual activity: Defer        REVIEW OF SYSTEMS    Constitutional: Awake alert and oriented x3, no acute distress, denies fevers, chills, weight loss  Respiratory: No respiratory distress  Vascular: Brisk cap refill, Intact distal pulses, No cyanosis, compartments soft with no signs or symptoms of compartment syndrome or DVT.   Cardiovascular: Denies chest pain, shortness of breath  Skin: Denies rashes, acute skin changes  Neurologic: Denies headache, loss of consciousness  MSK: Left knee pain      Objective   Vital Signs:   /74   Pulse 50   Ht 170.2 cm (67.01\")   Wt 98.9 kg (218 lb)   SpO2 92%   BMI 34.14 kg/m²     Body mass index is 34.14 kg/m².    Physical Exam       Left knee: Well-healed incision, no erythema, no ecchymosis, no swelling, no effusion, no signs of infection, full extension, flexion 120, " stable anterior/posterior drawer, stable to varus/valgus stress.  Nontender to palpation, no pain with range of motion.  5 out of 5 strength, no pain with resisted range of motion    Procedures    Imaging Results (Most Recent)       Procedure Component Value Units Date/Time    XR Knee 3 View Left [[id number]] Resulted: 09/30/24 1054     Updated: 09/30/24 1055    Narrative:      View:AP/Lateral and [place] view(s)  Site: Left knee  Indication: Left knee pain  Study: X-rays ordered, taken in the office, and reviewed today  X-ray: Intact appearing left total knee arthroplasty, no signs of hardware   failure or loosening, no subsidence or periprosthetic fracture, good   alignment  Comparative data: Previous studies             Result Review :   The following data was reviewed by: DAI Martinez on 09/30/2024:  Data reviewed : Radiologic studies reviewed by me with the patient              Assessment and Plan    Diagnoses and all orders for this visit:    1. Aftercare following left knee joint replacement surgery 4/3/2024 (Primary)  -     XR Knee 3 View Left        Reviewed x-rays with the patient discussed diagnosis and treatment options with him he was advised to continue weightbearing and activity as tolerated follow-up in 6 months for recheck with x-rays    Call or return if worsening symptoms.    Follow Up   Return in about 6 months (around 3/30/2025) for Recheck.  Patient was given instructions and counseling regarding his condition or for health maintenance advice. Please see specific information pulled into the AVS if appropriate.       EMR Dragon/Transcription disclaimer:  Part of this note may be an electronic transcription/translation of spoken language to printed text using the Dragon Dictation System

## 2024-10-24 ENCOUNTER — TELEPHONE (OUTPATIENT)
Dept: ORTHOPEDIC SURGERY | Facility: CLINIC | Age: 74
End: 2024-10-24
Payer: COMMERCIAL

## 2024-10-24 DIAGNOSIS — Z47.89 AFTERCARE FOLLOWING SURGERY OF THE MUSCULOSKELETAL SYSTEM: ICD-10-CM

## 2024-10-24 RX ORDER — CLINDAMYCIN HCL 300 MG
CAPSULE ORAL
Qty: 2 CAPSULE | Refills: 2 | Status: SHIPPED | OUTPATIENT
Start: 2024-10-24

## 2024-10-24 NOTE — TELEPHONE ENCOUNTER
PATIENT REQUESTING DENTAL PRE-MED ANTIBIOTIC FOR UPCOMING DENTAL APPT.   CLINDAMYCIN 300MG SENT TO [facility] PER PROTOCOL.

## 2025-02-27 ENCOUNTER — TELEPHONE (OUTPATIENT)
Dept: ORTHOPEDIC SURGERY | Facility: CLINIC | Age: 75
End: 2025-02-27
Payer: COMMERCIAL

## 2025-02-27 DIAGNOSIS — Z47.89 AFTERCARE FOLLOWING SURGERY OF THE MUSCULOSKELETAL SYSTEM: ICD-10-CM

## 2025-02-27 RX ORDER — CLINDAMYCIN HYDROCHLORIDE 300 MG/1
CAPSULE ORAL
Qty: 2 CAPSULE | Refills: 2 | Status: SHIPPED | OUTPATIENT
Start: 2025-02-27

## 2025-02-27 NOTE — TELEPHONE ENCOUNTER
PATIENT REQUESTING PRE MED DENTAL ABX.   CLINDAMYCIN MG 300MG #2 CAPSULES SENT TO [facility] PER PROTOCOL.

## 2025-03-07 ENCOUNTER — CLINICAL SUPPORT (OUTPATIENT)
Dept: GASTROENTEROLOGY | Facility: CLINIC | Age: 75
End: 2025-03-07
Payer: MEDICARE

## 2025-03-07 ENCOUNTER — PREP FOR SURGERY (OUTPATIENT)
Dept: OTHER | Facility: HOSPITAL | Age: 75
End: 2025-03-07
Payer: COMMERCIAL

## 2025-03-07 DIAGNOSIS — Z86.0100 HISTORY OF COLON POLYPS: Primary | ICD-10-CM

## 2025-03-07 DIAGNOSIS — Z12.11 ENCOUNTER FOR SCREENING FOR MALIGNANT NEOPLASM OF COLON: ICD-10-CM

## 2025-03-07 RX ORDER — ONDANSETRON 4 MG/1
4 TABLET, ORALLY DISINTEGRATING ORAL EVERY 8 HOURS PRN
COMMUNITY
Start: 2024-12-09

## 2025-03-07 RX ORDER — PEG-3350, SODIUM SULFATE, SODIUM CHLORIDE, POTASSIUM CHLORIDE, SODIUM ASCORBATE AND ASCORBIC ACID 7.5-2.691G
1000 KIT ORAL TAKE AS DIRECTED
Qty: 1 EACH | Refills: 1 | Status: SHIPPED | OUTPATIENT
Start: 2025-03-07

## 2025-03-07 NOTE — PROGRESS NOTES
Christopher Finley  1950  74 y.o.    Reason for call: Screening Colonoscopy  If recall, please list diagnosis: Colon polyps   Prep prescribed: Moviprep  Prep instructions reviewed with patient and sent to patient via regular mail to the home address on file  Is the patient currently on any injectable or oral medications for weight loss or diabetes? No  Clearance needed? No  If yes, what clearance is needed? N/A  Clearance has been requested from na  The patient has been scheduled for: Colonoscopy  Christopher Finley is aware they have been scheduled for a screening colonoscopy. Patient has expressed they are not having any symptoms at all.     After your procedure, you will be contacted with results. Please confirm the best phone # to reach the patient: [phone]  Family history of colon cancer? No  If yes, indicate relative: na  Tentative Procedure Date: 5.27.2025    Date/Place of last Scope: [facility] 2/10/2022  Able to obtain report? Yes in chart procedures       If colon screening and over the age of 75: The decision for colonoscopy is individualized. The risks of colonoscopy are: bleeding, perforation, splenic rupture, and missing a polyp. Patient is aware of this information and verbalizes that they want to proceed with the procedure: Yes      Family History   Problem Relation Age of Onset    Heart disease Mother     Cancer Mother     Kidney cancer Mother     Stroke Father     Kidney cancer Father     Arthritis Father     Malig Hyperthermia Neg Hx     Colon cancer Neg Hx      Past Medical History:   Diagnosis Date    Acid reflux     Anxiety     Arthritis     generalized,  right knee    Cancer     RIGHT CHEEK SKIN CANCER REMOVED    Chronic post-traumatic stress disorder (PTSD)     Claustrophobia     Condition not found     HX SEVERE LACERATION LEFT THUMB FROM TABLE SAW NERVE DAMAGE    COVID     10/2023    Depression     Depression     Diabetes mellitus     TYPE 2, BS IN THE A.M.     Explosive personality disorder      Gastroenteritis     [phone]    GERD (gastroesophageal reflux disease)     H/O degenerative disc disease     Hemorrhoids     Hyperlipemia     Hypertension     Left elbow pain 06/18/2018    Left shoulder pain 05/17/2018    Left wrist pain 06/22/2018    Mood disorder     PONV (postoperative nausea and vomiting)     Prostatitis     2020 HOSPITALIZED HAD UTI    PTSD (post-traumatic stress disorder)     Sleep apnea     cpap     Suicide ideation     HX OF SEVERAL YEARS AGO 2001 FOLLOWED BY COUNSELOR AND TAKES MEDICATION FOR PTSD, DEPRESSION, AND ANXIETY AND DENIES ANY CURRENT ISSUES    Tremor     OF RIGHT HAND/LOWER ARM ONLY. REPORTS HAS NOT RECEIVED OFFICIAL DIAGNOSIS FOR THIS    UTI (urinary tract infection)     HOSPITALIZED 2020. DENIES ANY CURRENT ISSUES     Allergies   Allergen Reactions    Amoxicillin Hives and Rash     Past Surgical History:   Procedure Laterality Date    CHOLECYSTECTOMY N/A 01/14/2022    Procedure: CHOLECYSTECTOMY LAPAROSCOPIC;  Surgeon: Abdulkadir Sol MD;  Location: [facility] OR [facility] [place];  Service: General;  Laterality: N/A;    COLONOSCOPY      COLONOSCOPY N/A 02/10/2022    Procedure: COLONOSCOPY with random biopsies, polypectomy with cold snare;  Surgeon: Lamont Hargrove MD;  Location: [facility] ENDOSCOPY;  Service: Gastroenterology;  Laterality: N/A;  diverticulosis, colon polyps     CYSTOSCOPY      ENDOSCOPY N/A 02/10/2022    Procedure: ESOPHAGOGASTRODUODENOSCOPY with biopsy;  Surgeon: Lamont Hargrove MD;  Location: [facility] ENDOSCOPY;  Service: Gastroenterology;  Laterality: N/A;  hiatal hernia    HAND SURGERY      left thumb repair    HEMORRHOIDECTOMY      BANDED, 2004,2006, 2007, 2020    INGUINAL HERNIA REPAIR  1998    RIGHT    LACERATION REPAIR      HEAD, AGE 18    MOUTH SURGERY Right     CYST REMOVED LOWER RIGHT JAW    PROSTATE BIOPSY      SIGMOIDOSCOPY      SKIN CANCER EXCISION      right cheek    TENDON REPAIR      LEFT HAND    TOTAL KNEE ARTHROPLASTY Right 6/1/2022     Procedure: TOTAL KNEE ARTHROPLASTY, right;  Surgeon: Black Jimenez MD;  Location: [facility] MAIN OR;  Service: Orthopedics;  Laterality: Right;    TOTAL KNEE ARTHROPLASTY Left 4/3/2024    Procedure: LEFT TOTAL KNEE ARTHROPLASTY;  Surgeon: Black Jimenez MD;  Location: [facility] MAIN OR;  Service: Orthopedics;  Laterality: Left;    UPPER GASTROINTESTINAL ENDOSCOPY       Social History     Socioeconomic History    Marital status:    Tobacco Use    Smoking status: Former     Current packs/day: 0.00     Types: Cigarettes     Quit date: 1970     Years since quittin.7    Smokeless tobacco: Never   Vaping Use    Vaping status: Never Used   Substance and Sexual Activity    Alcohol use: Never    Drug use: Never    Sexual activity: Defer       Current Outpatient Medications:     Aspirin Low Dose 81 MG EC tablet, , Disp: , Rfl:     buPROPion XL (WELLBUTRIN XL) 150 MG 24 hr tablet, Take 1 tablet by mouth Every Morning., Disp: , Rfl:     carBAMazepine (TEGretol) 200 MG tablet, Take 1 tablet by mouth Every Night., Disp: , Rfl:     carboxymethylcellulose (REFRESH PLUS) 0.5 % solution, Administer 1 drop to both eyes As Needed for Dry Eyes., Disp: , Rfl:     Cholecalciferol 25 MCG (1000 UT) tablet, , Disp: , Rfl:     clindamycin (Cleocin) 300 MG capsule, Take 2 capsules by mouth one hour prior to dental cleaning., Disp: 2 capsule, Rfl: 2    cyproheptadine (PERIACTIN) 4 MG tablet, Take 1 tablet by mouth Every Night., Disp: , Rfl:     Diclofenac Sodium (VOLTAREN) 1 % gel gel, Apply 4 g topically to the appropriate area as directed 4 (Four) Times a Day As Needed., Disp: , Rfl:     dicyclomine (BENTYL) 10 MG capsule, Take 1 capsule by mouth 4 (Four) Times a Day As Needed for Abdominal Cramping., Disp: , Rfl:     dilTIAZem (Tiazac) 180 MG 24 hr capsule, Take 1 capsule by mouth Every Night, Disp: 30 capsule, Rfl: 3    dilTIAZem CD (CARDIZEM CD) 180 MG 24 hr capsule, , Disp: , Rfl:     escitalopram (LEXAPRO) 20 MG  tablet, Take 1 tablet by mouth Every Night., Disp: , Rfl:     FREESTYLE LITE test strip, , Disp: , Rfl:     Hydrocortisone, Perianal, (ANUSOL-HC) 2.5 % rectal cream, Insert 1 Application into the rectum As Needed., Disp: , Rfl:     Lancets (freestyle) lancets, , Disp: , Rfl:     lisinopril (PRINIVIL,ZESTRIL) 10 MG tablet, Take 1 tablet by mouth Daily., Disp: , Rfl:     metFORMIN (GLUCOPHAGE) 500 MG tablet, Take 0.5 tablets by mouth 2 (Two) Times a Day With Meals. 1/2 tab, Disp: , Rfl:     ondansetron ODT (ZOFRAN-ODT) 4 MG disintegrating tablet, Place 1 tablet on the tongue Every 8 (Eight) Hours As Needed., Disp: , Rfl:     pantoprazole (PROTONIX) 40 MG EC tablet, Take 1 tablet by mouth Daily., Disp: , Rfl:     Psyllium 28.3 % powder, Take 1 g by mouth Every Night., Disp: , Rfl:     rosuvastatin (CRESTOR) 40 MG tablet, 0.5 tablets Daily., Disp: , Rfl:     vitamin B-12 (CYANOCOBALAMIN) 1000 MCG tablet, Take 1 tablet by mouth 2 (Two) Times a Day., Disp: , Rfl:     docusate sodium (COLACE) 100 MG capsule, , Disp: , Rfl:     HYDROcodone-acetaminophen (Norco) 7.5-325 MG per tablet, Take 1-2 tablets by mouth Every 4 (Four) Hours As Needed for Moderate Pain. (Patient not taking: Reported on 3/7/2025), Disp: 40 tablet, Rfl: 0    naloxone (NARCAN) 4 MG/0.1ML nasal spray, Call 911. Don't prime. [place] in 1 nostril for overdose. Repeat in 2-3 minutes in other nostril if no or minimal breathing/responsiveness. (Patient not taking: Reported on 3/7/2025), Disp: 2 each, Rfl: 0    oxyCODONE-acetaminophen (PERCOCET) 7.5-325 MG per tablet, Take 1-2 tablets by mouth Every 4 (Four) Hours As Needed for Moderate Pain. (Patient not taking: Reported on 3/7/2025), Disp: 40 tablet, Rfl: 0

## 2025-03-24 ENCOUNTER — TELEPHONE (OUTPATIENT)
Dept: GASTROENTEROLOGY | Facility: CLINIC | Age: 75
End: 2025-03-24
Payer: COMMERCIAL

## 2025-03-24 NOTE — TELEPHONE ENCOUNTER
Provider: DR WALKER FAGAN     Caller: CHYNA YOU     Relationship to Patient: SELF    Phone Number: [phone]     Reason for Call: PT HASN'T RECEIVED INFORMATION PACKET FOR HIS COLONOSCOPY ON 5/27/25 PLEASE ADVISE AND RESEND

## 2025-03-24 NOTE — TELEPHONE ENCOUNTER
Called patient to notify that his packet was sent again via mail.     Brochure has been mailed to patients address.

## 2025-04-07 ENCOUNTER — OFFICE VISIT (OUTPATIENT)
Dept: ORTHOPEDIC SURGERY | Facility: CLINIC | Age: 75
End: 2025-04-07
Payer: MEDICARE

## 2025-04-07 VITALS
OXYGEN SATURATION: 93 % | HEIGHT: 67 IN | SYSTOLIC BLOOD PRESSURE: 134 MMHG | DIASTOLIC BLOOD PRESSURE: 81 MMHG | HEART RATE: 57 BPM | BODY MASS INDEX: 35 KG/M2 | WEIGHT: 223 LBS

## 2025-04-07 DIAGNOSIS — M25.562 LEFT KNEE PAIN, UNSPECIFIED CHRONICITY: ICD-10-CM

## 2025-04-07 DIAGNOSIS — Z47.1 AFTERCARE FOLLOWING LEFT KNEE JOINT REPLACEMENT SURGERY: Primary | ICD-10-CM

## 2025-04-07 DIAGNOSIS — Z96.652 AFTERCARE FOLLOWING LEFT KNEE JOINT REPLACEMENT SURGERY: Primary | ICD-10-CM

## 2025-04-07 PROCEDURE — 1159F MED LIST DOCD IN RCRD: CPT | Performed by: PHYSICIAN ASSISTANT

## 2025-04-07 PROCEDURE — 3079F DIAST BP 80-89 MM HG: CPT | Performed by: PHYSICIAN ASSISTANT

## 2025-04-07 PROCEDURE — 3075F SYST BP GE 130 - 139MM HG: CPT | Performed by: PHYSICIAN ASSISTANT

## 2025-04-07 PROCEDURE — 99213 OFFICE O/P EST LOW 20 MIN: CPT | Performed by: PHYSICIAN ASSISTANT

## 2025-04-07 PROCEDURE — 1160F RVW MEDS BY RX/DR IN RCRD: CPT | Performed by: PHYSICIAN ASSISTANT

## 2025-04-07 NOTE — PROGRESS NOTES
"Chief Complaint  Follow-up of the Left Knee    Subjective          History of Present Illness      Christopher Finley is a 74 y.o. male  presents to [facility] for     Patient presents with his wife for follow-up evaluation of left total knee arthroplasty, 4/3/2024.  Patient states his knee is \"wonderful \".  He states he walks about a mile a day he denies pain denies difficulty with range of motion, no new complaints      Allergies   Allergen Reactions    Amoxicillin Hives and Rash        Social History     Socioeconomic History    Marital status:    Tobacco Use    Smoking status: Former     Current packs/day: 0.00     Types: Cigarettes     Quit date: 1970     Years since quittin.8    Smokeless tobacco: Never   Vaping Use    Vaping status: Never Used   Substance and Sexual Activity    Alcohol use: Never    Drug use: Never    Sexual activity: Defer        REVIEW OF SYSTEMS    Constitutional: Awake alert and oriented x3, no acute distress, denies fevers, chills, weight loss  Respiratory: No respiratory distress  Vascular: Brisk cap refill, Intact distal pulses, No cyanosis, compartments soft with no signs or symptoms of compartment syndrome or DVT.   Cardiovascular: Denies chest pain, shortness of breath  Skin: Denies rashes, acute skin changes  Neurologic: Denies headache, loss of consciousness  MSK: Left knee pain      Objective   Vital Signs:   /81   Pulse 57   Ht 170.2 cm (67.01\")   Wt 101 kg (223 lb)   SpO2 93%   BMI 34.92 kg/m²     Body mass index is 34.92 kg/m².    Physical Exam       Left knee: Well-healed incision, no erythema, no ecchymosis, no swelling, no effusion, no signs of infection, full extension, flexion 120, stable anterior/posterior drawer, stable to varus/valgus stress.  Nontender to palpation, no pain with range of motion.  5 out of 5 strength, nontender calf, negative Qing testing      Procedures    Imaging Results (Most Recent)       " Procedure Component Value Units Date/Time    XR Knee 3 View Left [[id number]] Resulted: 04/07/25 1415     Updated: 04/07/25 1415    Narrative:      View:AP/Lateral and Sunrise view(s)  Site: Left knee  Indication: Left knee pain  Study: X-rays ordered, taken in the office, and reviewed today  X-ray: Intact appearing left total knee arthroplasty, no signs of hardware   failure or loosening, no subsidence or periprosthetic fracture, good   alignment  Comparative data: Previous studies                   Assessment and Plan    Diagnoses and all orders for this visit:    1. Aftercare following left knee joint replacement surgery 4/3/2024 (Primary)    2. Left knee pain, unspecified chronicity  -     XR Knee 3 View Left        Reviewed x-rays with the patient discussed diagnosis and treatment options with him and his wife he was advised to continue activity and weightbearing as tolerated follow-up as needed per patient request    Call or return if worsening symptoms.    Follow Up   Return if symptoms worsen or fail to improve, for Recheck.  Patient was given instructions and counseling regarding his condition or for health maintenance advice. Please see specific information pulled into the AVS if appropriate.       EMR Dragon/Transcription disclaimer:  Part of this note may be an electronic transcription/translation of spoken language to printed text using the Dragon Dictation System

## 2025-05-13 ENCOUNTER — TELEPHONE (OUTPATIENT)
Dept: GASTROENTEROLOGY | Facility: CLINIC | Age: 75
End: 2025-05-13
Payer: COMMERCIAL

## 2025-05-13 NOTE — TELEPHONE ENCOUNTER
ENDO RECONCILIATION  Verify source of procedure(s):   If other, please list source:   TIME OUT-CONFIRM CORRECT PROCEDURE:   Cardiology:  Pulmonology:  Blood thinner:   GLP-1:  Additional DX/indication for procedure:    Please include any other notes relevant to endo reconciliation:

## 2025-05-23 ENCOUNTER — ANESTHESIA EVENT (OUTPATIENT)
Dept: GASTROENTEROLOGY | Facility: HOSPITAL | Age: 75
End: 2025-05-23
Payer: MEDICARE

## 2025-05-23 NOTE — ANESTHESIA PREPROCEDURE EVALUATION
Anesthesia Evaluation     Patient summary reviewed and Nursing notes reviewed   history of anesthetic complications:  PONV  NPO Solid Status: > 8 hours  NPO Liquid Status: > 4 hours           Airway   Mallampati: II  TM distance: >3 FB  Neck ROM: full  No difficulty expected  Dental    (+) edentulous    Pulmonary - normal exam   (+) a smoker Former, cigarettes,sleep apnea on CPAP  Cardiovascular     ECG reviewed    (+) hypertension 2 medications or greater, hyperlipidemia      Neuro/Psych  (+) tremors, psychiatric history (Explosive Personality Disorder) PTSD, Anxiety and Depression  GI/Hepatic/Renal/Endo    (+) GERD, diabetes mellitus type 2    Musculoskeletal     Abdominal   (+) obese   Substance History      OB/GYN          Other   arthritis,   history of cancer (Skin CA - removed)    ROS/Med Hx Other: EKG 3/25/24   HEART RATE= 58  bpm  RR Interval= 1044  ms  WY Interval= 183  ms  P Horizontal Axis= -24  deg  P Front Axis= 40  deg  QRSD Interval= 84  ms  QT Interval= 450  ms  QTcB= 440  ms  QRS Axis= -20  deg  T Wave Axis= 16  deg  - BORDERLINE ECG -  Sinus rhythm  Atrial premature complex  Borderline left axis deviation  Low voltage, precordial leads  Abnormal R-wave progression, early transition  Borderline T abnormalities, anterior leads  When compared with ECG of 24-Feb-2022 6:26:44,  No significant change    Stress Test 2/24/22  · Left ventricular ejection fraction is normal. (Calculated EF = 65%).  · Myocardial perfusion imaging indicates a normal myocardial perfusion study with no evidence of ischemia.  · Impressions are consistent with a low risk study.  · Findings consistent with a normal ECG stress test.          Phys Exam Other: [place] present                  Anesthesia Plan    ASA 3     general   total IV anesthesia  (Total IV Anesthesia    Patient understands anesthesia not responsible for dental damage.    Discussed risks with pt including aspiration, allergic reactions, apnea, advanced airway  placement. Pt verbalized understanding. All questions answered.  )  intravenous induction     Anesthetic plan, risks, benefits, and alternatives have been provided, discussed and informed consent has been obtained with: patient.  Pre-procedure education provided  Plan discussed with CRNA.      CODE STATUS:

## 2025-05-27 ENCOUNTER — ANESTHESIA (OUTPATIENT)
Dept: GASTROENTEROLOGY | Facility: HOSPITAL | Age: 75
End: 2025-05-27
Payer: MEDICARE

## 2025-05-27 ENCOUNTER — HOSPITAL ENCOUNTER (OUTPATIENT)
Facility: HOSPITAL | Age: 75
Setting detail: HOSPITAL OUTPATIENT SURGERY
Discharge: HOME | End: 2025-05-27
Attending: INTERNAL MEDICINE | Admitting: INTERNAL MEDICINE
Payer: MEDICARE

## 2025-05-27 VITALS
SYSTOLIC BLOOD PRESSURE: 120 MMHG | HEART RATE: 57 BPM | OXYGEN SATURATION: 97 % | DIASTOLIC BLOOD PRESSURE: 51 MMHG | TEMPERATURE: 97.9 F | RESPIRATION RATE: 22 BRPM | BODY MASS INDEX: 35.42 KG/M2 | WEIGHT: 226.19 LBS

## 2025-05-27 DIAGNOSIS — Z12.11 ENCOUNTER FOR SCREENING FOR MALIGNANT NEOPLASM OF COLON: ICD-10-CM

## 2025-05-27 DIAGNOSIS — Z86.0100 HISTORY OF COLON POLYPS: ICD-10-CM

## 2025-05-27 LAB — GLUCOSE BLDC GLUCOMTR-MCNC: 124 MG/DL (ref 70–99)

## 2025-05-27 PROCEDURE — 25010000002 PROPOFOL 10 MG/ML EMULSION

## 2025-05-27 PROCEDURE — 25810000003 LACTATED RINGERS PER 1000 ML: Performed by: NURSE ANESTHETIST, CERTIFIED REGISTERED

## 2025-05-27 PROCEDURE — 25010000002 LIDOCAINE PF 2% 2 % SOLUTION

## 2025-05-27 PROCEDURE — 82948 REAGENT STRIP/BLOOD GLUCOSE: CPT | Performed by: NURSE ANESTHETIST, CERTIFIED REGISTERED

## 2025-05-27 PROCEDURE — 88305 TISSUE EXAM BY PATHOLOGIST: CPT | Performed by: INTERNAL MEDICINE

## 2025-05-27 RX ORDER — SODIUM CHLORIDE, SODIUM LACTATE, POTASSIUM CHLORIDE, CALCIUM CHLORIDE 600; 310; 30; 20 MG/100ML; MG/100ML; MG/100ML; MG/100ML
30 INJECTION, SOLUTION INTRAVENOUS CONTINUOUS
Status: DISCONTINUED | OUTPATIENT
Start: 2025-05-27 | End: 2025-05-27 | Stop reason: HOSPADM

## 2025-05-27 RX ORDER — PROPOFOL 10 MG/ML
VIAL (ML) INTRAVENOUS AS NEEDED
Status: DISCONTINUED | OUTPATIENT
Start: 2025-05-27 | End: 2025-05-27 | Stop reason: SURG

## 2025-05-27 RX ORDER — LIDOCAINE HYDROCHLORIDE 20 MG/ML
INJECTION, SOLUTION EPIDURAL; INFILTRATION; INTRACAUDAL; PERINEURAL AS NEEDED
Status: DISCONTINUED | OUTPATIENT
Start: 2025-05-27 | End: 2025-05-27 | Stop reason: SURG

## 2025-05-27 RX ADMIN — PROPOFOL 100 MG: 10 INJECTION, EMULSION INTRAVENOUS at 09:31

## 2025-05-27 RX ADMIN — LIDOCAINE HYDROCHLORIDE 50 MG: 20 INJECTION, SOLUTION INTRAVENOUS at 09:31

## 2025-05-27 RX ADMIN — SODIUM CHLORIDE, POTASSIUM CHLORIDE, SODIUM LACTATE AND CALCIUM CHLORIDE 30 ML/HR: 600; 310; 30; 20 INJECTION, SOLUTION INTRAVENOUS at 08:36

## 2025-05-27 RX ADMIN — PROPOFOL 200 MCG/KG/MIN: 10 INJECTION, EMULSION INTRAVENOUS at 09:30

## 2025-05-27 NOTE — ANESTHESIA POSTPROCEDURE EVALUATION
Patient: Christopher Finley    Procedure Summary       Date: 05/27/25 Room / Location: [facility] ENDOSCOPY 4 / [facility] ENDOSCOPY    Anesthesia Start: 0928 Anesthesia Stop: 0959    Procedure: COLONOSCOPY with cold snare polypectomy Diagnosis:       History of colon polyps      Encounter for screening for malignant neoplasm of colon      (History of colon polyps [Z86.0100])      (Encounter for screening for malignant neoplasm of colon [Z12.11])    Surgeons: Lamont Hargrove MD Provider: Jhonatan Shah CRNA    Anesthesia Type: general ASA Status: 3            Anesthesia Type: general    Vitals  Vitals Value Taken Time   /51 05/27/25 10:13   Temp 36.6 °C (97.9 °F) 05/27/25 10:13   Pulse 57 05/27/25 10:13   Resp 22 05/27/25 10:13   SpO2 97 % 05/27/25 10:13           Post Anesthesia Care and Evaluation    Post-procedure mental status: acceptable.  Pain management: satisfactory to patient    Airway patency: patent  Anesthetic complications: No anesthetic complications    Cardiovascular status: acceptable  Respiratory status: acceptable    Comments: Per chart review

## 2025-05-27 NOTE — H&P
Pre Procedure History & Physical    Chief Complaint:   Past history of colon polyps    Subjective     HPI:   History of colon polyps    Past Medical History:   Past Medical History:   Diagnosis Date    Acid reflux     Anxiety     Arthritis     generalized,  right knee    Cancer     RIGHT CHEEK SKIN CANCER REMOVED    Chronic post-traumatic stress disorder (PTSD)     Claustrophobia     Condition not found     HX SEVERE LACERATION LEFT THUMB FROM TABLE SAW NERVE DAMAGE    COVID     10/2023    Depression     Depression     Diabetes mellitus     TYPE 2, BS IN THE A.M.     Explosive personality disorder     Gastroenteritis     [id number]    GERD (gastroesophageal reflux disease)     H/O degenerative disc disease     Hemorrhoids     Hyperlipemia     Hypertension     Left elbow pain 06/18/2018    Left shoulder pain 05/17/2018    Left wrist pain 06/22/2018    Mood disorder     PONV (postoperative nausea and vomiting)     Prostatitis     2020 HOSPITALIZED HAD UTI    PTSD (post-traumatic stress disorder)     Sleep apnea     cpap     Suicide ideation     HX OF SEVERAL YEARS AGO 2001 FOLLOWED BY COUNSELOR AND TAKES MEDICATION FOR PTSD, DEPRESSION, AND ANXIETY AND DENIES ANY CURRENT ISSUES    Tremor     OF RIGHT HAND/LOWER ARM ONLY. REPORTS HAS NOT RECEIVED OFFICIAL DIAGNOSIS FOR THIS    UTI (urinary tract infection)     HOSPITALIZED 2020. DENIES ANY CURRENT ISSUES       Past Surgical History:  Past Surgical History:   Procedure Laterality Date    CHOLECYSTECTOMY N/A 01/14/2022    Procedure: CHOLECYSTECTOMY LAPAROSCOPIC;  Surgeon: Abdulkadir Sol MD;  Location: [facility] [place] [facility] [place];  Service: General;  Laterality: N/A;    COLONOSCOPY      COLONOSCOPY N/A 02/10/2022    Procedure: COLONOSCOPY with random biopsies, polypectomy with cold snare;  Surgeon: Lamont Hargrove MD;  Location: [facility] ENDOSCOPY;  Service: Gastroenterology;  Laterality: N/A;  diverticulosis, colon polyps     CYSTOSCOPY      ENDOSCOPY N/A  02/10/2022    Procedure: ESOPHAGOGASTRODUODENOSCOPY with biopsy;  Surgeon: Lamont Hargrove MD;  Location: [facility] ENDOSCOPY;  Service: Gastroenterology;  Laterality: N/A;  hiatal hernia    HAND SURGERY      left thumb repair    HEMORRHOIDECTOMY      BANDED, 2004,2006, 2007, 2020    INGUINAL HERNIA REPAIR  1998    RIGHT    LACERATION REPAIR      HEAD, AGE 18    MOUTH SURGERY Right     CYST REMOVED LOWER RIGHT JAW    PROSTATE BIOPSY      SIGMOIDOSCOPY      SKIN CANCER EXCISION      right cheek    TENDON REPAIR      LEFT HAND    TOTAL KNEE ARTHROPLASTY Right 6/1/2022    Procedure: TOTAL KNEE ARTHROPLASTY, right;  Surgeon: Black Jimenez MD;  Location: [facility] MAIN OR;  Service: Orthopedics;  Laterality: Right;    TOTAL KNEE ARTHROPLASTY Left 4/3/2024    Procedure: LEFT TOTAL KNEE ARTHROPLASTY;  Surgeon: Black Jimenez MD;  Location: [facility] MAIN OR;  Service: Orthopedics;  Laterality: Left;    UPPER GASTROINTESTINAL ENDOSCOPY         Family History:  Family History   Problem Relation Age of Onset    Heart disease Mother     Cancer Mother     Kidney cancer Mother     Stroke Father     Kidney cancer Father     Arthritis Father     Malig Hyperthermia Neg Hx     Colon cancer Neg Hx        Social History:   reports that he quit smoking about 54 years ago. His smoking use included cigarettes. He has never used smokeless tobacco. He reports that he does not drink alcohol and does not use drugs.    Medications:   Medications Prior to Admission   Medication Sig Dispense Refill Last Dose/Taking    Aspirin Low Dose 81 MG EC tablet        buPROPion XL (WELLBUTRIN XL) 150 MG 24 hr tablet Take 1 tablet by mouth Every Morning.       carBAMazepine (TEGretol) 200 MG tablet Take 1 tablet by mouth Every Night.       carboxymethylcellulose (REFRESH PLUS) 0.5 % solution Administer 1 drop to both eyes As Needed for Dry Eyes.       Cholecalciferol 25 MCG (1000 UT) tablet        clindamycin (Cleocin) 300 MG capsule Take 2  capsules by mouth one hour prior to dental cleaning. 2 capsule 2     cyproheptadine (PERIACTIN) 4 MG tablet Take 1 tablet by mouth Every Night.       Diclofenac Sodium (VOLTAREN) 1 % gel gel Apply 4 g topically to the appropriate area as directed 4 (Four) Times a Day As Needed.       dicyclomine (BENTYL) 10 MG capsule Take 1 capsule by mouth 4 (Four) Times a Day As Needed for Abdominal Cramping.       dilTIAZem (Tiazac) 180 MG 24 hr capsule Take 1 capsule by mouth Every Night 30 capsule 3     dilTIAZem CD (CARDIZEM CD) 180 MG 24 hr capsule        docusate sodium (COLACE) 100 MG capsule  (Patient not taking: Reported on 3/7/2025)       escitalopram (LEXAPRO) 20 MG tablet Take 1 tablet by mouth Every Night.       FREESTYLE LITE test strip        HYDROcodone-acetaminophen (Norco) 7.5-325 MG per tablet Take 1-2 tablets by mouth Every 4 (Four) Hours As Needed for Moderate Pain. (Patient not taking: Reported on 3/7/2025) 40 tablet 0     Hydrocortisone, Perianal, (ANUSOL-HC) 2.5 % rectal cream Insert 1 Application into the rectum As Needed.       Lancets (freestyle) lancets        lisinopril (PRINIVIL,ZESTRIL) 10 MG tablet Take 1 tablet by mouth Daily.       metFORMIN (GLUCOPHAGE) 500 MG tablet Take 0.5 tablets by mouth 2 (Two) Times a Day With Meals. 1/2 tab       naloxone (NARCAN) 4 MG/0.1ML nasal spray Call 911. Don't prime. [place] in 1 nostril for overdose. Repeat in 2-3 minutes in other nostril if no or minimal breathing/responsiveness. (Patient not taking: Reported on 3/7/2025) 2 each 0     ondansetron ODT (ZOFRAN-ODT) 4 MG disintegrating tablet Place 1 tablet on the tongue Every 8 (Eight) Hours As Needed.       oxyCODONE-acetaminophen (PERCOCET) 7.5-325 MG per tablet Take 1-2 tablets by mouth Every 4 (Four) Hours As Needed for Moderate Pain. (Patient not taking: Reported on 3/7/2025) 40 tablet 0     pantoprazole (PROTONIX) 40 MG EC tablet Take 1 tablet by mouth Daily.       PEG-KCl-NaCl-NaSulf-Na Asc-C (MoviPrep) 100  g reconstituted solution powder Take 1,000 mL by mouth Take As Directed. 1 each 1     Psyllium 28.3 % powder Take 1 g by mouth Every Night.       rosuvastatin (CRESTOR) 40 MG tablet 0.5 tablets Daily.       vitamin B-12 (CYANOCOBALAMIN) 1000 MCG tablet Take 1 tablet by mouth 2 (Two) Times a Day.          Allergies:  Amoxicillin        Objective     Weight 103 kg (226 lb 3.1 oz).    Physical Exam   Constitutional: Pt is oriented to person, place, and time and well-developed, well-nourished, and in no distress.   Mouth/Throat: Oropharynx is clear and moist.   Neck: Normal range of motion.   Cardiovascular: Normal rate, regular rhythm and normal heart sounds.    Pulmonary/Chest: Effort normal and breath sounds normal.   Abdominal: Soft. Nontender  Skin: Skin is warm and dry.   Psychiatric: Mood, memory, affect and judgment normal.     Assessment & Plan     Diagnosis:  Surveillance for colon polyps    Anticipated Surgical Procedure:  Colonoscopy    The risks, benefits, and alternatives of this procedure have been discussed with the patient or the responsible party- the patient understands and agrees to proceed.

## 2025-06-05 ENCOUNTER — TELEPHONE (OUTPATIENT)
Dept: GASTROENTEROLOGY | Facility: CLINIC | Age: 75
End: 2025-06-05
Payer: COMMERCIAL

## 2025-06-05 NOTE — TELEPHONE ENCOUNTER
Colonoscopy: 5.27.25    Patient inquiring if he can be added to the recall system and then re-evaluate at that time if he would like to proceed with Colonoscopy or not. I did explain that after age 75, there are no screening guidelines. I also explained that the risks increase with age and that we may bring him in for an office visit at time of recall to discuss. I let patient know that I would ask and that if any GI symptoms should arise that he may always call our office.

## 2025-06-09 NOTE — TELEPHONE ENCOUNTER
He would actually be a 10 yr recall due to the size, type, and location of the polyp he had, it is considered very benign. So he would be 84. We would definitely want to know however if pt has any GI sx in the interim, as you listed below.

## (undated) DEVICE — TOWEL,OR,DSP,ST,BLUE,STD,4/PK,20PK/CS: Brand: MEDLINE

## (undated) DEVICE — 2, DISPOSABLE SUCTION/IRRIGATOR WITHOUT DISPOSABLE TIP: Brand: STRYKEFLOW

## (undated) DEVICE — COLON KIT: Brand: MEDLINE INDUSTRIES, INC.

## (undated) DEVICE — THE STERILE LIGHT HANDLE COVER IS USED WITH STERIS SURGICAL LIGHTING AND VISUALIZATION SYSTEMS.

## (undated) DEVICE — SCRW HEX PERSONA FML 2.5X25MM PK/2
Type: IMPLANTABLE DEVICE | Site: KNEE | Status: NON-FUNCTIONAL
Removed: 2024-04-03

## (undated) DEVICE — SUT VIC 0 UR6 27IN VCP603H

## (undated) DEVICE — ELECTRD BLD EDGE COAT 3IN

## (undated) DEVICE — STERILE POLYISOPRENE POWDER-FREE SURGICAL GLOVES: Brand: PROTEXIS

## (undated) DEVICE — INTENDED FOR TISSUE SEPARATION, AND OTHER PROCEDURES THAT REQUIRE A SHARP SURGICAL BLADE TO PUNCTURE OR CUT.: Brand: BARD-PARKER ® CARBON RIB-BACK BLADES

## (undated) DEVICE — GENERAL LAPAROSCOPY-LF: Brand: MEDLINE INDUSTRIES, INC.

## (undated) DEVICE — SYR LUERLOK 30CC

## (undated) DEVICE — NDL HYPO ECLPS SFTY 18G 1 1/2IN

## (undated) DEVICE — DRSNG PAD ABD 8X10IN STRL

## (undated) DEVICE — SLV SCD LEG COMFORT KENDALLSCD MD REPROC

## (undated) DEVICE — SUT MNCRYL 4/0 PS2 18 IN

## (undated) DEVICE — PENCL E/S HNDSWCH ROCKR CB

## (undated) DEVICE — LAPAROSCOPIC SCISSORS: Brand: EPIX LAPAROSCOPIC SCISSORS

## (undated) DEVICE — LAPAROSCOPIC ELECTRODE WITH A 3/32" PIN CONNECTOR, L-HOOK 5 MM X 27 CM: Brand: CONMED

## (undated) DEVICE — TOTAL KNEE-LF: Brand: MEDLINE INDUSTRIES, INC.

## (undated) DEVICE — SOL IRRG H2O PL/BG 1000ML STRL

## (undated) DEVICE — PEEL-AWAY TOGA, 2X LARGE: Brand: FLYTE

## (undated) DEVICE — ANTIBACTERIAL VIOLET BRAIDED (POLYGLACTIN 910), SYNTHETIC ABSORBABLE SURGICAL SUTURE: Brand: COATED VICRYL

## (undated) DEVICE — GLV SURG SENSICARE SLT PF LF 7.5 STRL

## (undated) DEVICE — 3 BONE CEMENT MIXER: Brand: MIXEVAC

## (undated) DEVICE — APPL CHLORAPREP HI/LITE 26ML ORNG

## (undated) DEVICE — GLV SURG BIOGEL LTX PF 7 1/2

## (undated) DEVICE — GLV SURG SENSICARE PI ORTHO SZ8 LF STRL

## (undated) DEVICE — MAT FLR ABS W/BLU/LINER 56X72IN WHT

## (undated) DEVICE — FAN SPRAY KIT: Brand: PULSAVAC®

## (undated) DEVICE — SINGLE-USE BIOPSY FORCEPS: Brand: RADIAL JAW 4

## (undated) DEVICE — TROCARS: Brand: KII® BALLOON BLUNT TIP SYSTEM

## (undated) DEVICE — SHEET,DRAPE,70X85,STERILE: Brand: MEDLINE

## (undated) DEVICE — STERILE PATIENT PROTECTIVE PAD FOR IMP® KNEE POSITIONERS & COHESIVE WRAP (10 / CASE): Brand: DE MAYO KNEE POSITIONER®

## (undated) DEVICE — SOLIDIFIER LIQLOC PLS 1500CC BT

## (undated) DEVICE — THE SINGLE USE ETRAP – POLYP TRAP IS USED FOR SUCTION RETRIEVAL OF ENDOSCOPICALLY REMOVED POLYPS.: Brand: ETRAP

## (undated) DEVICE — STERILE POLYISOPRENE POWDER-FREE SURGICAL GLOVES WITH EMOLLIENT COATING: Brand: PROTEXIS

## (undated) DEVICE — CVR LEG BOOTLEG F/R NOSKID 33IN

## (undated) DEVICE — SLV SCD KN/LEN ADJ EXPRSS BLENDED MD 1P/U

## (undated) DEVICE — UNDERCAST PADDING: Brand: DEROYAL

## (undated) DEVICE — 3M™ IOBAN™ 2 ANTIMICROBIAL INCISE DRAPE 6650EZ: Brand: IOBAN™ 2

## (undated) DEVICE — ADHS SKIN SURG TISS VISC PREMIERPRO EXOFIN HI/VISC FAST/DRY

## (undated) DEVICE — MEDI-VAC NON-CONDUCTIVE SUCTION TUBING: Brand: CARDINAL HEALTH

## (undated) DEVICE — PENCL E/S SMOKEEVAC W/TELESCP CANN

## (undated) DEVICE — PULLOVER TOGA, 2X LARGE: Brand: FLYTE, SURGICOOL

## (undated) DEVICE — SUT VIC 2/0 CT1 36IN

## (undated) DEVICE — Device

## (undated) DEVICE — STRYKER PERFORMANCE SERIES SAGITTAL BLADE: Brand: STRYKER PERFORMANCE SERIES

## (undated) DEVICE — EGD OR ERCP KIT: Brand: MEDLINE INDUSTRIES, INC.

## (undated) DEVICE — DEFENDO AIR WATER SUCTION AND BIOPSY VALVE KIT FOR  OLYMPUS: Brand: DEFENDO AIR/WATER/SUCTION AND BIOPSY VALVE

## (undated) DEVICE — BASIC SINGLE BASIN-LF: Brand: MEDLINE INDUSTRIES, INC.

## (undated) DEVICE — SOL IRR NACL 0.9PCT 3000ML

## (undated) DEVICE — PROXIMATE RH ROTATING HEAD SKIN STAPLERS (35 WIDE) CONTAINS 35 STAINLESS STEEL STAPLES: Brand: PROXIMATE

## (undated) DEVICE — Device: Brand: DEFENDO AIR/WATER/SUCTION AND BIOPSY VALVE

## (undated) DEVICE — GAUZE,SPONGE,4"X4",16PLY,STRL,LF,10/TRAY: Brand: MEDLINE

## (undated) DEVICE — DISPOSABLE TOURNIQUET CUFF 30"X4", 1-LINE, WHITE, STERILE, 1EA/PK, 10PK/CS: Brand: ASP MEDICAL

## (undated) DEVICE — SYS CLOSE PORTII CARTR/THOMASN XL

## (undated) DEVICE — 450 ML BOTTLE OF 0.05% CHLORHEXIDINE GLUCONATE IN 99.95% STERILE WATER FOR IRRIGATION, USP AND APPLICATOR.: Brand: IRRISEPT ANTIMICROBIAL WOUND LAVAGE

## (undated) DEVICE — VISUALIZATION SYSTEM: Brand: CLEARIFY

## (undated) DEVICE — DISPOSABLE TOURNIQUET CUFF SINGLE BLADDER, SINGLE PORT AND QUICK CONNECT CONNECTOR: Brand: COLOR CUFF

## (undated) DEVICE — BNDG ELAS MATRX V/CLS 6INX10YD LF

## (undated) DEVICE — SUT ETHLN 3/0 FS1 663G

## (undated) DEVICE — 3M™ STERI-DRAPE™ U-DRAPE 1015: Brand: STERI-DRAPE™

## (undated) DEVICE — ENDOPATH XCEL WITH OPTIVIEW TECHNOLOGY UNIVERSAL TROCAR STABILITY SLEEVES: Brand: ENDOPATH XCEL OPTIVIEW

## (undated) DEVICE — NO-SCRATCH ™ SMALL WHITNEY CURETTE ™ IS A SINGLE-USE, PLASTIC CURETTE FOR QUICKLY APPLYING, MANIPULATING AND REMOVING BONE CEMENT DURING HIP AND KNEE REPLACEMENT SURGERY. THE PLASTIC IS SOFTER THAN STEEL INSTRUMENTS, REDUCING THE RISK OF DAMAGING THE PROSTHESIS WITH METAL INSTRUMENTS.  THE CURETTE’S 6MM TIP REMOVES EXCESS CEMENT FROM REPLACEMENT HIPS AND KNEES. EASY-TO-MANEUVER, THE SMALL BLUE CURETTE LETS YOU REMOVE CEMENT FROM ALL EDGES OF THE PROSTHESIS.NO-SCRATCH WHITNEY SMALL CURETTE FEATURES:SAFER THAN STEEL- MADE OF PLASTIC - STURDY YET SOFTER THAN SURGICAL STEEL.HANDIER- EACH TOOL HAS A MOLDED-IN THUMB INDENTATION INSTANTLY ORIENTING THE TOOL.- EASIER TO MANEUVER IN HARD TO SEE PLACES.- COLOR-CODED FOR EASY IDENTIFICATION.FASTER- COMES INDIVIDUALLY PACKAGED IN STERILE, PEEL OPEN POUCH, READY TO GO.- APPLIES, MANIPULATES, OR REMOVES CEMENT WITH FINGERTIP PRECISION.ECONOMICAL- THE COST OF A SINGLE REVISION DWARFS THE COST OF A SINGLE-USE CURETTE. - DISPOSABLE – THERE’S NO NEED TO WASTE TIME REMOVING HARDENED CEMENT OR RE-STERILIZING TOOLS.- LESS EXPENSIVE TO BUY AND INVENTORY - ORDER ONLY THE TOOL YOU USE.- PACKAGED 25 INDIVIDUALLY WRAPPED TOOLS TO A CARTON FOR CONVENIENT SHELF STORAGE.: Brand: WHITNEY NO-SCRATCH CURETTE (SMALL)

## (undated) DEVICE — DRP SURG U/DRP INVISISHIELD PA 48X52IN

## (undated) DEVICE — SNAR E/S POLYP SNAREMASTER OVL/10MM 2.8X2300MM YEL

## (undated) DEVICE — BNDG COTN/ELAS FLEXMASTER DBL/LENGTH CLIP/CLS 6IN 11YD

## (undated) DEVICE — Device: Brand: PULSAVAC®

## (undated) DEVICE — GOWN,REINFORCE,POLY,SIRUS,BREATH SLV,XLG: Brand: MEDLINE

## (undated) DEVICE — TISSUE RETRIEVAL SYSTEM: Brand: INZII RETRIEVAL SYSTEM

## (undated) DEVICE — ENDOPATH XCEL WITH OPTIVIEW TECHNOLOGY BLADELESS TROCARS WITH STABILITY SLEEVES: Brand: ENDOPATH XCEL OPTIVIEW